# Patient Record
Sex: MALE | Race: BLACK OR AFRICAN AMERICAN | NOT HISPANIC OR LATINO | Employment: OTHER | URBAN - METROPOLITAN AREA
[De-identification: names, ages, dates, MRNs, and addresses within clinical notes are randomized per-mention and may not be internally consistent; named-entity substitution may affect disease eponyms.]

---

## 2019-03-27 DIAGNOSIS — M79.671 FOOT PAIN, BILATERAL: Primary | ICD-10-CM

## 2019-03-27 DIAGNOSIS — M79.672 FOOT PAIN, BILATERAL: Primary | ICD-10-CM

## 2019-04-30 ENCOUNTER — OFFICE VISIT (OUTPATIENT)
Dept: FAMILY MEDICINE | Facility: CLINIC | Age: 59
End: 2019-04-30
Payer: MEDICARE

## 2019-04-30 VITALS
WEIGHT: 178.25 LBS | DIASTOLIC BLOOD PRESSURE: 64 MMHG | SYSTOLIC BLOOD PRESSURE: 128 MMHG | OXYGEN SATURATION: 98 % | HEART RATE: 74 BPM | TEMPERATURE: 98 F

## 2019-04-30 DIAGNOSIS — R03.0 ELEVATED BLOOD-PRESSURE READING WITHOUT DIAGNOSIS OF HYPERTENSION: ICD-10-CM

## 2019-04-30 DIAGNOSIS — B35.1 ONYCHOMYCOSIS DUE TO DERMATOPHYTE: ICD-10-CM

## 2019-04-30 DIAGNOSIS — F20.9 SCHIZOPHRENIA, UNSPECIFIED TYPE: Primary | ICD-10-CM

## 2019-04-30 PROBLEM — E78.5 HYPERLIPIDEMIA: Status: ACTIVE | Noted: 2018-01-02

## 2019-04-30 PROBLEM — B18.2 CHRONIC HEPATITIS C: Status: ACTIVE | Noted: 2019-04-30

## 2019-04-30 PROBLEM — M25.9 DISORDER OF KNEE: Status: ACTIVE | Noted: 2019-04-30

## 2019-04-30 PROBLEM — B35.2 TINEA MANUS: Status: ACTIVE | Noted: 2019-04-30

## 2019-04-30 PROCEDURE — 99213 OFFICE O/P EST LOW 20 MIN: CPT | Mod: S$GLB,,, | Performed by: FAMILY MEDICINE

## 2019-04-30 PROCEDURE — 99213 PR OFFICE/OUTPT VISIT, EST, LEVL III, 20-29 MIN: ICD-10-PCS | Mod: S$GLB,,, | Performed by: FAMILY MEDICINE

## 2019-04-30 RX ORDER — AMANTADINE HYDROCHLORIDE 100 MG/1
CAPSULE, GELATIN COATED ORAL
Status: ON HOLD | COMMUNITY
Start: 2018-05-31 | End: 2022-09-28 | Stop reason: HOSPADM

## 2019-04-30 RX ORDER — BENZTROPINE MESYLATE 1 MG/1
TABLET ORAL
Status: ON HOLD | COMMUNITY
Start: 2018-05-31 | End: 2022-09-28 | Stop reason: HOSPADM

## 2019-04-30 RX ORDER — HALOPERIDOL 5 MG/1
TABLET ORAL
Status: ON HOLD | COMMUNITY
Start: 2019-03-17 | End: 2022-09-28 | Stop reason: SDUPTHER
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
## 2019-05-22 ENCOUNTER — OFFICE VISIT (OUTPATIENT)
Dept: FAMILY MEDICINE | Facility: CLINIC | Age: 59
End: 2019-05-22
Payer: MEDICARE

## 2019-05-22 VITALS
BODY MASS INDEX: 28.45 KG/M2 | OXYGEN SATURATION: 98 % | DIASTOLIC BLOOD PRESSURE: 76 MMHG | SYSTOLIC BLOOD PRESSURE: 134 MMHG | HEART RATE: 95 BPM | WEIGHT: 181.25 LBS | HEIGHT: 67 IN | TEMPERATURE: 99 F

## 2019-05-22 DIAGNOSIS — R03.0 ELEVATED BLOOD-PRESSURE READING WITHOUT DIAGNOSIS OF HYPERTENSION: ICD-10-CM

## 2019-05-22 DIAGNOSIS — M25.9: ICD-10-CM

## 2019-05-22 DIAGNOSIS — L84 CORN OR CALLUS: Primary | ICD-10-CM

## 2019-05-22 PROCEDURE — 99213 PR OFFICE/OUTPT VISIT, EST, LEVL III, 20-29 MIN: ICD-10-PCS | Mod: S$GLB,,, | Performed by: FAMILY MEDICINE

## 2019-05-22 PROCEDURE — 99213 OFFICE O/P EST LOW 20 MIN: CPT | Mod: S$GLB,,, | Performed by: FAMILY MEDICINE

## 2019-05-22 NOTE — PROGRESS NOTES
Subjective:       Patient ID: Brandyn Ty is a 58 y.o. male.    Chief Complaint: Foot Pain (Pt stated that he saw the podiatrist & shaved the underside of his left foot. Pt stated that he has another appt in 3 mo.)    57 yo M here for f/u on podiatrist visit for his corn/callus under the ball of the foot under left hallux.   Pt got the callus shaved of and he can walk without pain right now.  He is very happy about that. He does have an extensive limp 2ry to his left sided knee fusion.   Pt has another appt there in 3 months and he is looking forward to going there.  Pt's BP had been high in the past but pt states that he is taking a tylenol every day and that has kept his BP normal.  Pt has no other problems or concerns at this time    Review of Systems   Constitutional: Negative for chills, fatigue and fever.   HENT: Negative for congestion, drooling, sneezing and sore throat.    Eyes: Negative for pain and visual disturbance.   Respiratory: Negative for cough and shortness of breath.    Cardiovascular: Negative for chest pain.   Gastrointestinal: Negative for abdominal pain, constipation, diarrhea and nausea.   Endocrine: Negative for cold intolerance and heat intolerance.   Genitourinary: Negative for difficulty urinating and frequency.   Musculoskeletal: Negative for myalgias.   Allergic/Immunologic: Negative for food allergies.   Neurological: Negative for seizures and headaches.   Psychiatric/Behavioral: Negative for behavioral problems.       Objective:      Physical Exam   Constitutional: He appears well-developed.   HENT:   Right Ear: External ear normal.   Left Ear: External ear normal.   Mouth/Throat: Oropharynx is clear and moist.   Eyes: Conjunctivae and EOM are normal.   Neck: Normal range of motion.   Cardiovascular: Normal rate and regular rhythm.   Pulmonary/Chest: Effort normal and breath sounds normal.   Abdominal: Soft.   Musculoskeletal: Normal range of motion.   Neurological: He is  alert.   Skin: Skin is warm. Capillary refill takes less than 2 seconds.   Psychiatric: He has a normal mood and affect.   Nursing note and vitals reviewed.      Assessment:       1. Corn or callus    2. Elevated blood-pressure reading without diagnosis of hypertension    3. Disorder of knee        Plan:       PROBLEM LIST     Brandyn was seen today for foot pain.    Diagnoses and all orders for this visit:    Corn or callus    Elevated blood-pressure reading without diagnosis of hypertension    Disorder of knee

## 2019-06-27 ENCOUNTER — OFFICE VISIT (OUTPATIENT)
Dept: FAMILY MEDICINE | Facility: CLINIC | Age: 59
End: 2019-06-27
Payer: MEDICARE

## 2019-06-27 VITALS
DIASTOLIC BLOOD PRESSURE: 76 MMHG | HEART RATE: 85 BPM | HEIGHT: 67 IN | WEIGHT: 180 LBS | TEMPERATURE: 98 F | OXYGEN SATURATION: 98 % | BODY MASS INDEX: 28.25 KG/M2 | SYSTOLIC BLOOD PRESSURE: 124 MMHG

## 2019-06-27 DIAGNOSIS — M25.9: ICD-10-CM

## 2019-06-27 DIAGNOSIS — Z02.9 ADMINISTRATIVE ENCOUNTER: ICD-10-CM

## 2019-06-27 DIAGNOSIS — L84 CORN OR CALLUS: Primary | ICD-10-CM

## 2019-06-27 PROCEDURE — 99213 OFFICE O/P EST LOW 20 MIN: CPT | Mod: S$GLB,,, | Performed by: FAMILY MEDICINE

## 2019-06-27 PROCEDURE — 99213 PR OFFICE/OUTPT VISIT, EST, LEVL III, 20-29 MIN: ICD-10-PCS | Mod: S$GLB,,, | Performed by: FAMILY MEDICINE

## 2019-06-27 NOTE — PROGRESS NOTES
Subjective:       Patient ID: Brandyn Ty is a 58 y.o. male.    Chief Complaint: Foot Pain (Pt stated that he has left foot pain. Pt stated that he is going to [place] in Aug 14th. Pt stated that he needed some paperwork filled out for the [employer] system.)    57 yo M here for filling out some paper work for transportation issues. Pt cannot take the scheduled bus any more as he has a hard time with his balance and foot pain. He has a slightly mental disability as well as motor deterioration.  Pt also shows me the corn he has under his left big toe. He has gotten it shaved off in the past but it has started to grow back and it is starting to inhibit pt's walk.  BP is well controlled today  Pt has no other problems or concerns at this time    Review of Systems   Constitutional: Negative for chills, fatigue and fever.   HENT: Negative for congestion, drooling, sneezing and sore throat.    Eyes: Negative for pain and visual disturbance.   Respiratory: Negative for cough and shortness of breath.    Cardiovascular: Negative for chest pain.   Gastrointestinal: Negative for abdominal pain, constipation, diarrhea and nausea.   Endocrine: Negative for cold intolerance and heat intolerance.   Genitourinary: Negative for difficulty urinating and frequency.   Musculoskeletal: Negative for myalgias.   Allergic/Immunologic: Negative for food allergies.   Neurological: Negative for seizures and headaches.   Psychiatric/Behavioral: Negative for behavioral problems.       Objective:      Physical Exam   Constitutional: He appears well-developed.   HENT:   Right Ear: External ear normal.   Left Ear: External ear normal.   Mouth/Throat: Oropharynx is clear and moist.   Eyes: Conjunctivae and EOM are normal.   Neck: Normal range of motion.   Cardiovascular: Normal rate, regular rhythm and intact distal pulses.   Pulmonary/Chest: Effort normal and breath sounds normal.   Abdominal: Soft.   Musculoskeletal:  Normal range of motion.   Neurological: He is alert. Coordination abnormal.   Skin: Skin is warm. Capillary refill takes less than 2 seconds.   Thick corn under left toe-ball   Psychiatric: He has a normal mood and affect.   Nursing note and vitals reviewed.      Assessment:       1. Corn or callus    2. Administrative encounter        Plan:       PROBLEM LIST     Brandyn was seen today for foot pain.    Diagnoses and all orders for this visit:    Corn or callus    Administrative encounter

## 2019-08-22 ENCOUNTER — OFFICE VISIT (OUTPATIENT)
Dept: FAMILY MEDICINE | Facility: CLINIC | Age: 59
End: 2019-08-22
Payer: MEDICARE

## 2019-08-22 VITALS
RESPIRATION RATE: 16 BRPM | WEIGHT: 193 LBS | DIASTOLIC BLOOD PRESSURE: 90 MMHG | BODY MASS INDEX: 27.63 KG/M2 | SYSTOLIC BLOOD PRESSURE: 140 MMHG | HEART RATE: 82 BPM | HEIGHT: 70 IN | OXYGEN SATURATION: 98 % | TEMPERATURE: 97 F

## 2019-08-22 DIAGNOSIS — Z74.09 IMPAIRED MOBILITY: Primary | ICD-10-CM

## 2019-08-22 DIAGNOSIS — L84 CORN OR CALLUS: ICD-10-CM

## 2019-08-22 DIAGNOSIS — M25.9: ICD-10-CM

## 2019-08-22 DIAGNOSIS — B35.1 ONYCHOMYCOSIS DUE TO DERMATOPHYTE: ICD-10-CM

## 2019-08-22 DIAGNOSIS — Z91.81 AT RISK FOR FALLS: ICD-10-CM

## 2019-08-22 PROCEDURE — 99213 PR OFFICE/OUTPT VISIT, EST, LEVL III, 20-29 MIN: ICD-10-PCS | Mod: S$GLB,,, | Performed by: FAMILY MEDICINE

## 2019-08-22 PROCEDURE — 99213 OFFICE O/P EST LOW 20 MIN: CPT | Mod: S$GLB,,, | Performed by: FAMILY MEDICINE

## 2019-08-22 RX ORDER — DICYCLOMINE HYDROCHLORIDE 20 MG/1
TABLET ORAL
Refills: 3 | Status: ON HOLD | COMMUNITY
Start: 2019-07-22 | End: 2022-09-28 | Stop reason: HOSPADM
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
## 2019-10-23 ENCOUNTER — OFFICE VISIT (OUTPATIENT)
Dept: FAMILY MEDICINE | Facility: CLINIC | Age: 59
End: 2019-10-23
Payer: MEDICARE

## 2019-10-23 VITALS
WEIGHT: 189.63 LBS | TEMPERATURE: 97 F | OXYGEN SATURATION: 99 % | SYSTOLIC BLOOD PRESSURE: 120 MMHG | DIASTOLIC BLOOD PRESSURE: 70 MMHG | RESPIRATION RATE: 16 BRPM | HEIGHT: 70 IN | BODY MASS INDEX: 27.15 KG/M2 | HEART RATE: 79 BPM

## 2019-10-23 DIAGNOSIS — K04.7 INFECTED TOOTH: Primary | ICD-10-CM

## 2019-10-23 PROCEDURE — 99213 PR OFFICE/OUTPT VISIT, EST, LEVL III, 20-29 MIN: ICD-10-PCS | Mod: S$GLB,,, | Performed by: FAMILY MEDICINE

## 2019-10-23 PROCEDURE — 3008F BODY MASS INDEX DOCD: CPT | Mod: CPTII,S$GLB,, | Performed by: FAMILY MEDICINE

## 2019-10-23 PROCEDURE — 99213 OFFICE O/P EST LOW 20 MIN: CPT | Mod: S$GLB,,, | Performed by: FAMILY MEDICINE

## 2019-10-23 PROCEDURE — 3008F PR BODY MASS INDEX (BMI) DOCUMENTED: ICD-10-PCS | Mod: CPTII,S$GLB,, | Performed by: FAMILY MEDICINE

## 2019-10-23 RX ORDER — AMOXICILLIN AND CLAVULANATE POTASSIUM 875; 125 MG/1; MG/1
1 TABLET, FILM COATED ORAL EVERY 12 HOURS
Qty: 14 TABLET | Refills: 0 | Status: ON HOLD | OUTPATIENT
Start: 2019-10-23 | End: 2022-08-30 | Stop reason: HOSPADM

## 2019-10-23 NOTE — PROGRESS NOTES
Subjective:       Patient ID: Brandyn Ty is a 59 y.o. male.    Chief Complaint: Dental Pain (thinks he has an infection in one of his teeth he went to the dentist last week and they said they were not going to pull it because there was an infection. ) and Foot Pain (left foot has a corn on the bottom and states that there is one developing on his right foot now.)    58 yo M here for corn on the bottom of his foot. His callus had been filed off by Dr Lombardo but he retired. He then went to see Dr Marina who could not see him as pt had some run-in with his wife. He now will be seen by Dr Doll in December. Pt will try to get seen earlier - maybe getting on a cancellation list.   Pt is here today for an infection in his teeth. Pt has seen a dentist at [facility] and he said that pt had an infection in left tooth. Pt did not get an antibiotics as he thought he needed to talk to me first. Treatment: augmentin as per [employer]    Review of Systems   Constitutional: Negative for chills, fatigue and fever.   HENT: Negative for congestion, drooling, sneezing and sore throat.    Eyes: Negative for pain and visual disturbance.   Respiratory: Negative for cough and shortness of breath.    Cardiovascular: Negative for chest pain.   Gastrointestinal: Negative for abdominal pain, constipation, diarrhea and nausea.   Endocrine: Negative for cold intolerance and heat intolerance.   Genitourinary: Negative for difficulty urinating and frequency.   Musculoskeletal: Negative for myalgias.   Allergic/Immunologic: Negative for food allergies.   Neurological: Negative for seizures and headaches.   Psychiatric/Behavioral: Negative for behavioral problems.       Objective:      Physical Exam   Constitutional: He appears well-developed.   HENT:   Right Ear: External ear normal.   Left Ear: External ear normal.   Mouth/Throat: Oropharynx is clear and moist.   Left lower jaw tenderness   Eyes: Conjunctivae and EOM are normal.   Neck:  Normal range of motion.   Cardiovascular: Normal rate, regular rhythm and intact distal pulses.   Pulmonary/Chest: Effort normal and breath sounds normal.   Abdominal: Soft.   Musculoskeletal: Normal range of motion.   Neurological: He is alert.   Skin: Skin is warm. Capillary refill takes less than 2 seconds.   Psychiatric: He has a normal mood and affect.   Nursing note and vitals reviewed.      Assessment:       1. Infected tooth        Plan:       PROBLEM LIST     Brandyn was seen today for dental pain and foot pain.    Diagnoses and all orders for this visit:    Infected tooth  -     amoxicillin-clavulanate 875-125mg (AUGMENTIN) 875-125 mg per tablet; Take 1 tablet by mouth every 12 (twelve) hours.

## 2019-11-11 ENCOUNTER — OFFICE VISIT (OUTPATIENT)
Dept: FAMILY MEDICINE | Facility: CLINIC | Age: 59
End: 2019-11-11
Payer: MEDICARE

## 2019-11-11 VITALS
BODY MASS INDEX: 27.66 KG/M2 | OXYGEN SATURATION: 98 % | DIASTOLIC BLOOD PRESSURE: 72 MMHG | HEIGHT: 70 IN | SYSTOLIC BLOOD PRESSURE: 122 MMHG | RESPIRATION RATE: 16 BRPM | HEART RATE: 77 BPM | TEMPERATURE: 98 F | WEIGHT: 193.19 LBS

## 2019-11-11 DIAGNOSIS — R09.81 CONGESTION OF PARANASAL SINUS: ICD-10-CM

## 2019-11-11 DIAGNOSIS — R19.7 DIARRHEA, UNSPECIFIED TYPE: ICD-10-CM

## 2019-11-11 DIAGNOSIS — L84 CORN OR CALLUS: Primary | ICD-10-CM

## 2019-11-11 DIAGNOSIS — R03.0 ELEVATED BLOOD-PRESSURE READING WITHOUT DIAGNOSIS OF HYPERTENSION: ICD-10-CM

## 2019-11-11 DIAGNOSIS — E78.5 HYPERLIPIDEMIA, UNSPECIFIED HYPERLIPIDEMIA TYPE: ICD-10-CM

## 2019-11-11 DIAGNOSIS — R05.9 COUGH: ICD-10-CM

## 2019-11-11 PROCEDURE — 96372 THER/PROPH/DIAG INJ SC/IM: CPT | Mod: S$GLB,,, | Performed by: FAMILY MEDICINE

## 2019-11-11 PROCEDURE — 99214 PR OFFICE/OUTPT VISIT, EST, LEVL IV, 30-39 MIN: ICD-10-PCS | Mod: 25,S$GLB,, | Performed by: FAMILY MEDICINE

## 2019-11-11 PROCEDURE — 3008F PR BODY MASS INDEX (BMI) DOCUMENTED: ICD-10-PCS | Mod: CPTII,S$GLB,, | Performed by: FAMILY MEDICINE

## 2019-11-11 PROCEDURE — 99214 OFFICE O/P EST MOD 30 MIN: CPT | Mod: 25,S$GLB,, | Performed by: FAMILY MEDICINE

## 2019-11-11 PROCEDURE — 3008F BODY MASS INDEX DOCD: CPT | Mod: CPTII,S$GLB,, | Performed by: FAMILY MEDICINE

## 2019-11-11 PROCEDURE — 96372 PR INJECTION,THERAP/PROPH/DIAG2ST, IM OR SUBCUT: ICD-10-PCS | Mod: S$GLB,,, | Performed by: FAMILY MEDICINE

## 2019-11-11 RX ORDER — BETAMETHASONE SODIUM PHOSPHATE AND BETAMETHASONE ACETATE 3; 3 MG/ML; MG/ML
6 INJECTION, SUSPENSION INTRA-ARTICULAR; INTRALESIONAL; INTRAMUSCULAR; SOFT TISSUE
Status: COMPLETED | OUTPATIENT
Start: 2019-11-11 | End: 2019-11-11

## 2019-11-11 RX ORDER — PROMETHAZINE HYDROCHLORIDE AND CODEINE PHOSPHATE 6.25; 1 MG/5ML; MG/5ML
5 SOLUTION ORAL EVERY 4 HOURS PRN
Qty: 240 ML | Refills: 0 | Status: SHIPPED | OUTPATIENT
Start: 2019-11-11 | End: 2019-11-21

## 2019-11-11 RX ORDER — FLUTICASONE PROPIONATE 50 MCG
1 SPRAY, SUSPENSION (ML) NASAL 2 TIMES DAILY
Refills: 0 | Status: ON HOLD | COMMUNITY
Start: 2019-11-06 | End: 2022-09-28 | Stop reason: HOSPADM

## 2019-11-11 RX ADMIN — BETAMETHASONE SODIUM PHOSPHATE AND BETAMETHASONE ACETATE 6 MG: 3; 3 INJECTION, SUSPENSION INTRA-ARTICULAR; INTRALESIONAL; INTRAMUSCULAR; SOFT TISSUE at 11:11

## 2019-11-11 NOTE — PROGRESS NOTES
"Subjective:       Patient ID: Brandyn Ty is a 59 y.o. male.    Chief Complaint: Follow-up (pt is wanting a referral for his foot. pt states he has a back calas/corn on both feet.)    58 yo M here for continuing calluses on his soles of his feet. Pt walked-in to [facility] and he was seen there but Dr Lombardo retired. We also made another referral in March but pt was never contacted. Pt contacted his insurance and they gave him the name of Ruben Doll and Octavio Zurita who we will refer to today.   Pt has had these callus build ups for several months now. He has changed his footwear and his calluses came back anyway. Last successful visit with podiatry was when he got the calluses shaved off. This helped his foot pain tremendously and he has been searching for a new podiatrist ever since.   Pt had pneumonia and he went to [facility] for it.   BP is controlled today.  Pt thinks he still has congestion and cough from his walking pneumonia he was dx'd with last week. He was given meds but he wants "good" cough medication.     Review of Systems   Constitutional: Negative for chills, fatigue and fever.   HENT: Positive for congestion. Negative for drooling, sneezing and sore throat.    Eyes: Negative for pain and visual disturbance.   Respiratory: Positive for cough. Negative for shortness of breath.    Cardiovascular: Negative for chest pain.   Gastrointestinal: Negative for abdominal pain, constipation, diarrhea and nausea.   Endocrine: Negative for cold intolerance and heat intolerance.   Genitourinary: Negative for difficulty urinating and frequency.   Musculoskeletal: Negative for myalgias.   Allergic/Immunologic: Negative for food allergies.   Neurological: Negative for seizures and headaches.   Psychiatric/Behavioral: Negative for behavioral problems.       Objective:      Physical Exam   Constitutional: He appears well-developed.   HENT:   Right Ear: External ear normal.   Left Ear: External ear " normal.   Mouth/Throat: Oropharynx is clear and moist.   Eyes: Conjunctivae and EOM are normal.   Neck: Normal range of motion.   Cardiovascular: Normal rate, regular rhythm and intact distal pulses.   Pulmonary/Chest: Effort normal and breath sounds normal.   Abdominal: Soft.   Musculoskeletal: Normal range of motion. He exhibits no edema.   Neurological: He is alert.   Skin: Skin is warm. Capillary refill takes less than 2 seconds.   Psychiatric: He has a normal mood and affect.   Nursing note and vitals reviewed.      Assessment:       1. Corn or callus    2. Elevated blood-pressure reading without diagnosis of hypertension    3. Hyperlipidemia, unspecified hyperlipidemia type    4. Diarrhea, unspecified type    5. Cough    6. Congestion of paranasal sinus        Plan:       PROBLEM LIST     Brandyn was seen today for follow-up.    Diagnoses and all orders for this visit:    Hannah or callus  -     Ambulatory Referral to Podiatry    Elevated blood-pressure reading without diagnosis of hypertension  Comments:  resolved now    Hyperlipidemia, unspecified hyperlipidemia type  Comments:  supposedly not controlled - we have no labs    Diarrhea, unspecified type  Comments:  controlled now    Cough  -     promethazine-codeine 6.25-10 mg/5 ml (PHENERGAN WITH CODEINE) 6.25-10 mg/5 mL syrup; Take 5 mLs by mouth every 4 (four) hours as needed for Cough.  -     betamethasone acetate-betamethasone sodium phosphate injection 6 mg    Congestion of paranasal sinus  -     betamethasone acetate-betamethasone sodium phosphate injection 6 mg

## 2020-02-06 ENCOUNTER — OFFICE VISIT (OUTPATIENT)
Dept: FAMILY MEDICINE | Facility: CLINIC | Age: 60
End: 2020-02-06
Payer: MEDICARE

## 2020-02-06 VITALS
RESPIRATION RATE: 16 BRPM | WEIGHT: 191 LBS | BODY MASS INDEX: 27.35 KG/M2 | OXYGEN SATURATION: 98 % | SYSTOLIC BLOOD PRESSURE: 132 MMHG | HEIGHT: 70 IN | HEART RATE: 83 BPM | DIASTOLIC BLOOD PRESSURE: 86 MMHG | TEMPERATURE: 97 F

## 2020-02-06 DIAGNOSIS — S09.93XD INJURY OF TOOTH, SUBSEQUENT ENCOUNTER: Primary | Chronic | ICD-10-CM

## 2020-02-06 DIAGNOSIS — Z71.2 ENCOUNTER TO DISCUSS TEST RESULTS: ICD-10-CM

## 2020-02-06 DIAGNOSIS — E78.5 HYPERLIPIDEMIA, UNSPECIFIED HYPERLIPIDEMIA TYPE: Chronic | ICD-10-CM

## 2020-02-06 PROCEDURE — 3008F BODY MASS INDEX DOCD: CPT | Mod: CPTII,S$GLB,, | Performed by: FAMILY MEDICINE

## 2020-02-06 PROCEDURE — 3008F PR BODY MASS INDEX (BMI) DOCUMENTED: ICD-10-PCS | Mod: CPTII,S$GLB,, | Performed by: FAMILY MEDICINE

## 2020-02-06 PROCEDURE — 99213 OFFICE O/P EST LOW 20 MIN: CPT | Mod: S$GLB,,, | Performed by: FAMILY MEDICINE

## 2020-02-06 PROCEDURE — 99213 PR OFFICE/OUTPT VISIT, EST, LEVL III, 20-29 MIN: ICD-10-PCS | Mod: S$GLB,,, | Performed by: FAMILY MEDICINE

## 2020-02-06 RX ORDER — ACETAMINOPHEN 500 MG
500 TABLET ORAL 2 TIMES DAILY
Status: ON HOLD | COMMUNITY
End: 2022-08-30 | Stop reason: HOSPADM

## 2020-02-06 RX ORDER — DULOXETIN HYDROCHLORIDE 30 MG/1
30 CAPSULE, DELAYED RELEASE ORAL DAILY
Status: ON HOLD | COMMUNITY
End: 2022-09-28 | Stop reason: HOSPADM

## 2020-02-06 RX ORDER — SIMVASTATIN 20 MG/1
20 TABLET, FILM COATED ORAL NIGHTLY
Qty: 90 TABLET | Refills: 3 | Status: ON HOLD | OUTPATIENT
Start: 2020-02-06 | End: 2022-09-28

## 2020-02-06 RX ORDER — NAPROXEN 500 MG/1
500 TABLET ORAL 2 TIMES DAILY PRN
Status: ON HOLD | COMMUNITY
Start: 2020-01-22 | End: 2022-09-28 | Stop reason: HOSPADM

## 2020-02-06 NOTE — PROGRESS NOTES
Subjective:       Patient ID: Brandyn Ty is a 59 y.o. male.    Chief Complaint: Dental Pain    60 yo M here because he has lost two pieces of two teeth this morning and he has lost 1/2 piece of his wisdom tooth last week after eating an apple. Pt is wondering whether any of his meds can possibly be causing this.   Dentists in pt's insurance network are at [facility] on [facility]. He has tried making his own appt and he even went up there but he could not make an appt. It has to be done over the phone. He does not have the phone number with him today but discussed that we will help him call the dentist tomorrow when he comes with the phone number.  We are looking at his labs from another doctor and most of them are wnl except for elevated LDL. His ascvd = 11.3%- pt okay with getting on a statin.     Review of Systems   Constitutional: Negative for chills, fatigue and fever.   HENT: Negative for congestion, drooling, sneezing and sore throat.    Eyes: Negative for pain and visual disturbance.   Respiratory: Negative for cough and shortness of breath.    Cardiovascular: Negative for chest pain.   Gastrointestinal: Negative for abdominal pain, constipation, diarrhea and nausea.   Endocrine: Negative for cold intolerance and heat intolerance.   Genitourinary: Negative for difficulty urinating and frequency.   Musculoskeletal: Negative for myalgias.   Allergic/Immunologic: Negative for food allergies.   Neurological: Negative for seizures and headaches.   Psychiatric/Behavioral: Negative for behavioral problems.       Objective:      Physical Exam   Constitutional: He appears well-developed.   HENT:   Right Ear: External ear normal.   Left Ear: External ear normal.   Mouth/Throat: Oropharynx is clear and moist.   Eyes: Conjunctivae and EOM are normal.   Neck: Normal range of motion.   Cardiovascular: Normal rate, regular rhythm and intact distal pulses.   Pulmonary/Chest: Effort normal and breath sounds  normal.   Abdominal: Soft.   Musculoskeletal: Normal range of motion.   Neurological: He is alert.   Skin: Skin is warm. Capillary refill takes less than 2 seconds.   Psychiatric: He has a normal mood and affect.   Nursing note and vitals reviewed.      Assessment:       1. Injury of tooth, subsequent encounter    2. Encounter to discuss test results    3. Hyperlipidemia, unspecified hyperlipidemia type        Plan:       PROBLEM LIST     Brandyn was seen today for dental pain.    Diagnoses and all orders for this visit:    Injury of tooth, subsequent encounter  Comments:  chipped teeth    Encounter to discuss test results  Comments:  labs from different doctor - [facility]    Hyperlipidemia, unspecified hyperlipidemia type  Comments:  new dx; LDL = 131; ascvd = 11.7% - simvastatin started  Orders:  -     simvastatin (ZOCOR) 20 MG tablet; Take 1 tablet (20 mg total) by mouth every evening.

## 2020-04-08 ENCOUNTER — PATIENT OUTREACH (OUTPATIENT)
Dept: ADMINISTRATIVE | Facility: HOSPITAL | Age: 60
End: 2020-04-08

## 2020-04-08 NOTE — PROGRESS NOTES
Immunizations abstracted. New immunizations found. HM updated.  Care Everywhere abstracted, no records found.  [facility]: no new records found [facility]: no new records found [facility]: no new records found.  *KDL*

## 2020-04-20 ENCOUNTER — TELEPHONE (OUTPATIENT)
Dept: FAMILY MEDICINE | Facility: CLINIC | Age: 60
End: 2020-04-20

## 2020-04-20 NOTE — TELEPHONE ENCOUNTER
Called pt. On 304 number and on [phone] number, both were wrong numbers. Left message on home number, [phone] number.

## 2020-04-20 NOTE — TELEPHONE ENCOUNTER
----- Message from Sam Briones sent at 4/20/2020  1:48 PM CDT -----  Contact: Pt  Please call Brandyn to discuss some medication he is requesting but couldn't remember the name of during the call [phone].

## 2020-05-15 ENCOUNTER — TELEPHONE (OUTPATIENT)
Dept: FAMILY MEDICINE | Facility: CLINIC | Age: 60
End: 2020-05-15

## 2020-05-15 NOTE — TELEPHONE ENCOUNTER
I called Leobardo back, She was wanting to know what HH this pt used in the past but I looked through his chart and didn't see anything about us sending him to  or any notes as to which one he used. I let her know that.

## 2020-05-15 NOTE — TELEPHONE ENCOUNTER
----- Message from Madeline Awad sent at 5/15/2020  9:30 AM CDT -----  [facility]   Ms. Booth called in regards to when the patient last home health was , please call back at [phone].        Thanks,  Madeline Awad

## 2021-07-08 ENCOUNTER — TELEPHONE (OUTPATIENT)
Dept: PRIMARY CARE CLINIC | Facility: CLINIC | Age: 61
End: 2021-07-08

## 2021-09-24 ENCOUNTER — PATIENT OUTREACH (OUTPATIENT)
Dept: ADMINISTRATIVE | Facility: HOSPITAL | Age: 61
End: 2021-09-24

## 2022-01-25 ENCOUNTER — PATIENT OUTREACH (OUTPATIENT)
Dept: ADMINISTRATIVE | Facility: HOSPITAL | Age: 62
End: 2022-01-25
Payer: MEDICARE

## 2022-01-25 NOTE — PROGRESS NOTES
[employer] Report for A1C  None found, pt not seen in 2021.  Pt does not have diagnosis of diabetes and is not on registry.

## 2022-02-09 ENCOUNTER — HISTORICAL (OUTPATIENT)
Dept: ADMINISTRATIVE | Facility: HOSPITAL | Age: 62
End: 2022-02-09

## 2022-02-09 LAB
ALBUMIN SERPL-MCNC: 3 G/DL (ref 3.4–4.8)
ALBUMIN/GLOB SERPL: 0.8 {RATIO} (ref 1.1–2)
ALP SERPL-CCNC: 73 U/L (ref 40–150)
ALT SERPL-CCNC: 15 U/L (ref 0–55)
AST SERPL-CCNC: 13 U/L (ref 5–34)
BILIRUB SERPL-MCNC: 0.2 MG/DL
BILIRUBIN DIRECT+TOT PNL SERPL-MCNC: 0.1 (ref 0–0.5)
BILIRUBIN DIRECT+TOT PNL SERPL-MCNC: 0.1 (ref 0–0.8)
BUN SERPL-MCNC: 8.6 MG/DL (ref 8.4–25.7)
CALCIUM SERPL-MCNC: 9.3 MG/DL (ref 8.7–10.5)
CHLORIDE SERPL-SCNC: 107 MMOL/L (ref 98–107)
CHOLEST SERPL-MCNC: 147 MG/DL
CHOLEST/HDLC SERPL: 4 {RATIO} (ref 0–5)
CO2 SERPL-SCNC: 26 MMOL/L (ref 23–31)
CREAT SERPL-MCNC: 0.8 MG/DL (ref 0.73–1.18)
ERYTHROCYTE [DISTWIDTH] IN BLOOD BY AUTOMATED COUNT: 13.2 % (ref 11.5–17)
GLOBULIN SER-MCNC: 3.8 G/DL (ref 2.4–3.5)
GLUCOSE SERPL-MCNC: 82 MG/DL (ref 82–115)
HCT VFR BLD AUTO: 39.6 % (ref 42–52)
HDLC SERPL-MCNC: 33 MG/DL (ref 35–60)
HEMOLYSIS INTERF INDEX SERPL-ACNC: 0
HGB BLD-MCNC: 12.4 G/DL (ref 14–18)
ICTERIC INTERF INDEX SERPL-ACNC: 0
LDLC SERPL CALC-MCNC: 94 MG/DL (ref 50–140)
LIPEMIC INTERF INDEX SERPL-ACNC: 3
MCH RBC QN AUTO: 28.1 PG (ref 27–31)
MCHC RBC AUTO-ENTMCNC: 31.3 G/DL (ref 33–36)
MCV RBC AUTO: 89.6 FL (ref 80–94)
PLATELET # BLD AUTO: 374 10*3/UL (ref 130–400)
PMV BLD AUTO: 9.4 FL (ref 9.4–12.4)
POTASSIUM SERPL-SCNC: 4.1 MMOL/L (ref 3.5–5.1)
PROT SERPL-MCNC: 6.8 G/DL (ref 5.8–7.6)
RBC # BLD AUTO: 4.42 10*6/UL (ref 4.7–6.1)
SODIUM SERPL-SCNC: 142 MMOL/L (ref 136–145)
TRIGL SERPL-MCNC: 100 MG/DL (ref 34–140)
TSH SERPL-ACNC: 1.4 M[IU]/L (ref 0.35–4.94)
VLDLC SERPL CALC-MCNC: 20 MG/DL
WBC # SPEC AUTO: 10.2 10*3/UL (ref 4.5–11.5)

## 2022-02-10 ENCOUNTER — HISTORICAL (OUTPATIENT)
Dept: ADMINISTRATIVE | Facility: HOSPITAL | Age: 62
End: 2022-02-10

## 2022-02-10 LAB
AMPHET UR QL SCN: NEGATIVE
APPEARANCE, UA: CLEAR
BACTERIA SPEC CULT: NORMAL
BARBITURATE SCN PRESENT UR: NEGATIVE
BENZODIAZ UR QL SCN: NEGATIVE
BILIRUB UR QL STRIP: NEGATIVE
CANNABINOIDS UR QL SCN: NEGATIVE
COCAINE UR QL SCN: NEGATIVE
COLOR UR: YELLOW
GLUCOSE (UA): NEGATIVE
HGB UR QL STRIP: NORMAL
KETONES UR QL STRIP: NEGATIVE
LEUKOCYTE ESTERASE UR QL STRIP: NORMAL
NITRITE UR QL STRIP: POSITIVE
OPIATES UR QL SCN: NEGATIVE
PCP UR QL: NEGATIVE
PH UR STRIP.AUTO: 7 [PH] (ref 5–7.5)
PH UR STRIP: 7 [PH] (ref 5–9)
PROT UR QL STRIP: NEGATIVE
RBC #/AREA URNS HPF: NORMAL /[HPF] (ref 0–2)
SP GR FLD REFRACTOMETRY: 1.01 (ref 1–1.03)
SP GR UR STRIP: 1.01 (ref 1–1.03)
SQUAMOUS EPITHELIAL, UA: NORMAL
UROBILINOGEN UR STRIP-ACNC: 0.2
WBC #/AREA URNS HPF: NORMAL /[HPF] (ref 0–2)

## 2022-08-24 ENCOUNTER — HOSPITAL ENCOUNTER (INPATIENT)
Facility: HOSPITAL | Age: 62
LOS: 35 days | Discharge: NURSING, REHAB OR LONG-TERM CARE | DRG: 480 | End: 2022-09-28
Attending: EMERGENCY MEDICINE | Admitting: ORTHOPAEDIC SURGERY
Payer: MEDICARE

## 2022-08-24 ENCOUNTER — ANESTHESIA EVENT (OUTPATIENT)
Dept: SURGERY | Facility: HOSPITAL | Age: 62
DRG: 480 | End: 2022-08-24
Payer: MEDICARE

## 2022-08-24 ENCOUNTER — ANESTHESIA (OUTPATIENT)
Dept: SURGERY | Facility: HOSPITAL | Age: 62
DRG: 480 | End: 2022-08-24
Payer: MEDICARE

## 2022-08-24 DIAGNOSIS — S82.132F: ICD-10-CM

## 2022-08-24 DIAGNOSIS — S82.142C: ICD-10-CM

## 2022-08-24 DIAGNOSIS — E78.5 HYPERLIPIDEMIA, UNSPECIFIED HYPERLIPIDEMIA TYPE: Chronic | ICD-10-CM
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
## 2022-08-24 DIAGNOSIS — S82.262A CLOSED DISPLACED SEGMENTAL FRACTURE OF SHAFT OF LEFT TIBIA, INITIAL ENCOUNTER: Primary | ICD-10-CM

## 2022-08-24 PROBLEM — S72.422C: Status: ACTIVE | Noted: 2022-08-24

## 2022-08-24 LAB
ALBUMIN SERPL-MCNC: 4.2 GM/DL (ref 3.4–4.8)
ALBUMIN/GLOB SERPL: 0.9 RATIO (ref 1.1–2)
ALP SERPL-CCNC: 71 UNIT/L (ref 40–150)
ALT SERPL-CCNC: 19 UNIT/L (ref 0–55)
APTT PPP: 31.3 SECONDS (ref 23.2–33.7)
AST SERPL-CCNC: 25 UNIT/L (ref 5–34)
BASOPHILS # BLD AUTO: 0.06 X10(3)/MCL (ref 0–0.2)
BASOPHILS NFR BLD AUTO: 0.4 %
BILIRUBIN DIRECT+TOT PNL SERPL-MCNC: 0.6 MG/DL
BUN SERPL-MCNC: 9.4 MG/DL (ref 8.4–25.7)
CALCIUM SERPL-MCNC: 9.8 MG/DL (ref 8.8–10)
CHLORIDE SERPL-SCNC: 89 MMOL/L (ref 98–107)
CO2 SERPL-SCNC: 23 MMOL/L (ref 23–31)
CREAT SERPL-MCNC: 0.91 MG/DL (ref 0.73–1.18)
EOSINOPHIL # BLD AUTO: 0.17 X10(3)/MCL (ref 0–0.9)
EOSINOPHIL NFR BLD AUTO: 1 %
ERYTHROCYTE [DISTWIDTH] IN BLOOD BY AUTOMATED COUNT: 12.7 % (ref 11.5–17)
GFR SERPLBLD CREATININE-BSD FMLA CKD-EPI: >60 MLS/MIN/1.73/M2
GLOBULIN SER-MCNC: 4.6 GM/DL (ref 2.4–3.5)
GLUCOSE SERPL-MCNC: 104 MG/DL (ref 82–115)
GROUP & RH: NORMAL
HCT VFR BLD AUTO: 39.6 % (ref 42–52)
HGB BLD-MCNC: 13.7 GM/DL (ref 14–18)
IMM GRANULOCYTES # BLD AUTO: 0.09 X10(3)/MCL (ref 0–0.04)
IMM GRANULOCYTES NFR BLD AUTO: 0.6 %
INDIRECT COOMBS GEL: NORMAL
INR BLD: 1.05 (ref 0–1.3)
LYMPHOCYTES # BLD AUTO: 1.17 X10(3)/MCL (ref 0.6–4.6)
LYMPHOCYTES NFR BLD AUTO: 7.2 %
MCH RBC QN AUTO: 29.5 PG (ref 27–31)
MCHC RBC AUTO-ENTMCNC: 34.6 MG/DL (ref 33–36)
MCV RBC AUTO: 85.2 FL (ref 80–94)
MONOCYTES # BLD AUTO: 1.09 X10(3)/MCL (ref 0.1–1.3)
MONOCYTES NFR BLD AUTO: 6.7 %
NEUTROPHILS # BLD AUTO: 13.8 X10(3)/MCL (ref 2.1–9.2)
NEUTROPHILS NFR BLD AUTO: 84.1 %
NRBC BLD AUTO-RTO: 0 %
PLATELET # BLD AUTO: 397 X10(3)/MCL (ref 130–400)
PMV BLD AUTO: 9.2 FL (ref 7.4–10.4)
POTASSIUM SERPL-SCNC: 4 MMOL/L (ref 3.5–5.1)
PROT SERPL-MCNC: 8.8 GM/DL (ref 5.8–7.6)
PROTHROMBIN TIME: 13.6 SECONDS (ref 12.5–14.5)
RBC # BLD AUTO: 4.65 X10(6)/MCL (ref 4.7–6.1)
SODIUM SERPL-SCNC: 123 MMOL/L (ref 136–145)
WBC # SPEC AUTO: 16.4 X10(3)/MCL (ref 4.5–11.5)

## 2022-08-24 PROCEDURE — 85610 PROTHROMBIN TIME: CPT | Performed by: EMERGENCY MEDICINE

## 2022-08-24 PROCEDURE — 90471 IMMUNIZATION ADMIN: CPT | Performed by: EMERGENCY MEDICINE

## 2022-08-24 PROCEDURE — 11012 PR DEBRIDE ASSOC OPEN FX/DISLO SKIN/MUS/BONE: ICD-10-PCS | Mod: 51,,, | Performed by: ORTHOPAEDIC SURGERY

## 2022-08-24 PROCEDURE — 85025 COMPLETE CBC W/AUTO DIFF WBC: CPT | Performed by: EMERGENCY MEDICINE

## 2022-08-24 PROCEDURE — 36415 COLL VENOUS BLD VENIPUNCTURE: CPT | Performed by: EMERGENCY MEDICINE

## 2022-08-24 PROCEDURE — 63600175 PHARM REV CODE 636 W HCPCS: Performed by: NURSE ANESTHETIST, CERTIFIED REGISTERED

## 2022-08-24 PROCEDURE — 37000008 HC ANESTHESIA 1ST 15 MINUTES: Performed by: ORTHOPAEDIC SURGERY

## 2022-08-24 PROCEDURE — C1713 ANCHOR/SCREW BN/BN,TIS/BN: HCPCS | Performed by: ORTHOPAEDIC SURGERY

## 2022-08-24 PROCEDURE — 80053 COMPREHEN METABOLIC PANEL: CPT | Performed by: EMERGENCY MEDICINE

## 2022-08-24 PROCEDURE — 86901 BLOOD TYPING SEROLOGIC RH(D): CPT | Performed by: EMERGENCY MEDICINE

## 2022-08-24 PROCEDURE — 36000711: Performed by: ORTHOPAEDIC SURGERY

## 2022-08-24 PROCEDURE — 11000001 HC ACUTE MED/SURG PRIVATE ROOM

## 2022-08-24 PROCEDURE — 99285 EMERGENCY DEPT VISIT HI MDM: CPT | Mod: 25

## 2022-08-24 PROCEDURE — 25000003 PHARM REV CODE 250: Performed by: NURSE ANESTHETIST, CERTIFIED REGISTERED

## 2022-08-24 PROCEDURE — 27201423 OPTIME MED/SURG SUP & DEVICES STERILE SUPPLY: Performed by: ORTHOPAEDIC SURGERY

## 2022-08-24 PROCEDURE — 85730 THROMBOPLASTIN TIME PARTIAL: CPT | Performed by: EMERGENCY MEDICINE

## 2022-08-24 PROCEDURE — 63600175 PHARM REV CODE 636 W HCPCS: Performed by: ORTHOPAEDIC SURGERY

## 2022-08-24 PROCEDURE — 71000033 HC RECOVERY, INTIAL HOUR: Performed by: ORTHOPAEDIC SURGERY

## 2022-08-24 PROCEDURE — 36000710: Performed by: ORTHOPAEDIC SURGERY

## 2022-08-24 PROCEDURE — 20690 APPL UNIPLN UNI EXT FIXJ SYS: CPT | Mod: LT,,, | Performed by: ORTHOPAEDIC SURGERY

## 2022-08-24 PROCEDURE — 96374 THER/PROPH/DIAG INJ IV PUSH: CPT

## 2022-08-24 PROCEDURE — 63600175 PHARM REV CODE 636 W HCPCS: Performed by: EMERGENCY MEDICINE

## 2022-08-24 PROCEDURE — 90715 TDAP VACCINE 7 YRS/> IM: CPT | Performed by: EMERGENCY MEDICINE

## 2022-08-24 PROCEDURE — 25000003 PHARM REV CODE 250: Performed by: EMERGENCY MEDICINE

## 2022-08-24 PROCEDURE — 20690 PR APPLY BONE UNIPLANE,EXT FIX DEV: ICD-10-PCS | Mod: LT,,, | Performed by: ORTHOPAEDIC SURGERY

## 2022-08-24 PROCEDURE — 99283 PR EMERGENCY DEPT VISIT,LEVEL III: ICD-10-PCS | Mod: 57,,, | Performed by: ORTHOPAEDIC SURGERY

## 2022-08-24 PROCEDURE — 99283 EMERGENCY DEPT VISIT LOW MDM: CPT | Mod: 57,,, | Performed by: ORTHOPAEDIC SURGERY

## 2022-08-24 PROCEDURE — 11012 DEB SKIN BONE AT FX SITE: CPT | Mod: 51,,, | Performed by: ORTHOPAEDIC SURGERY

## 2022-08-24 PROCEDURE — 63600175 PHARM REV CODE 636 W HCPCS: Performed by: ANESTHESIOLOGY

## 2022-08-24 PROCEDURE — 37000009 HC ANESTHESIA EA ADD 15 MINS: Performed by: ORTHOPAEDIC SURGERY

## 2022-08-24 DEVICE — SCREW SCHANZ 5MM X 200MM: Type: IMPLANTABLE DEVICE | Site: LEG | Status: FUNCTIONAL

## 2022-08-24 DEVICE — CLAMP LG 6 POSITION MULTI-PIN: Type: IMPLANTABLE DEVICE | Site: LEG | Status: FUNCTIONAL

## 2022-08-24 DEVICE — CLAMP COMBINATION / LG EXT FIX: Type: IMPLANTABLE DEVICE | Site: LEG | Status: FUNCTIONAL

## 2022-08-24 DEVICE — POST FIXATOR EXTERAL 11MM SS: Type: IMPLANTABLE DEVICE | Site: LEG | Status: FUNCTIONAL

## 2022-08-24 RX ORDER — SODIUM CHLORIDE 0.9 % (FLUSH) 0.9 %
10 SYRINGE (ML) INJECTION
Status: DISCONTINUED | OUTPATIENT
Start: 2022-08-24 | End: 2022-09-28 | Stop reason: HOSPADM

## 2022-08-24 RX ORDER — CEFAZOLIN SODIUM 1 G/3ML
2 INJECTION, POWDER, FOR SOLUTION INTRAMUSCULAR; INTRAVENOUS
Status: COMPLETED | OUTPATIENT
Start: 2022-08-24 | End: 2022-08-24

## 2022-08-24 RX ORDER — EPHEDRINE SULFATE 50 MG/ML
INJECTION, SOLUTION INTRAVENOUS
Status: DISCONTINUED | OUTPATIENT
Start: 2022-08-24 | End: 2022-08-24

## 2022-08-24 RX ORDER — ENOXAPARIN SODIUM 100 MG/ML
40 INJECTION SUBCUTANEOUS EVERY 24 HOURS
Status: DISCONTINUED | OUTPATIENT
Start: 2022-08-25 | End: 2022-09-15

## 2022-08-24 RX ORDER — PHENYLEPHRINE HCL IN 0.9% NACL 1 MG/10 ML
SYRINGE (ML) INTRAVENOUS
Status: DISCONTINUED | OUTPATIENT
Start: 2022-08-24 | End: 2022-08-24

## 2022-08-24 RX ORDER — DEXAMETHASONE SODIUM PHOSPHATE 4 MG/ML
INJECTION, SOLUTION INTRA-ARTICULAR; INTRALESIONAL; INTRAMUSCULAR; INTRAVENOUS; SOFT TISSUE
Status: DISCONTINUED | OUTPATIENT
Start: 2022-08-24 | End: 2022-08-24

## 2022-08-24 RX ORDER — ONDANSETRON 2 MG/ML
4 INJECTION INTRAMUSCULAR; INTRAVENOUS EVERY 12 HOURS PRN
Status: DISCONTINUED | OUTPATIENT
Start: 2022-08-24 | End: 2022-09-28 | Stop reason: HOSPADM

## 2022-08-24 RX ORDER — ROCURONIUM BROMIDE 10 MG/ML
INJECTION, SOLUTION INTRAVENOUS
Status: DISCONTINUED | OUTPATIENT
Start: 2022-08-24 | End: 2022-08-24

## 2022-08-24 RX ORDER — ONDANSETRON 2 MG/ML
4 INJECTION INTRAMUSCULAR; INTRAVENOUS DAILY PRN
Status: DISCONTINUED | OUTPATIENT
Start: 2022-08-24 | End: 2022-09-28 | Stop reason: HOSPADM

## 2022-08-24 RX ORDER — HYDROMORPHONE HYDROCHLORIDE 2 MG/ML
0.4 INJECTION, SOLUTION INTRAMUSCULAR; INTRAVENOUS; SUBCUTANEOUS EVERY 5 MIN PRN
Status: DISCONTINUED | OUTPATIENT
Start: 2022-08-24 | End: 2022-09-28 | Stop reason: HOSPADM

## 2022-08-24 RX ORDER — VANCOMYCIN HYDROCHLORIDE 1 G/20ML
INJECTION, POWDER, LYOPHILIZED, FOR SOLUTION INTRAVENOUS
Status: DISCONTINUED | OUTPATIENT
Start: 2022-08-24 | End: 2022-08-24 | Stop reason: HOSPADM

## 2022-08-24 RX ORDER — CEFAZOLIN SODIUM 2 G/50ML
2 SOLUTION INTRAVENOUS
Status: COMPLETED | OUTPATIENT
Start: 2022-08-25 | End: 2022-08-25

## 2022-08-24 RX ORDER — SUCCINYLCHOLINE CHLORIDE 20 MG/ML
INJECTION INTRAMUSCULAR; INTRAVENOUS
Status: DISCONTINUED | OUTPATIENT
Start: 2022-08-24 | End: 2022-08-24

## 2022-08-24 RX ORDER — PROPOFOL 10 MG/ML
VIAL (ML) INTRAVENOUS
Status: DISCONTINUED | OUTPATIENT
Start: 2022-08-24 | End: 2022-08-24

## 2022-08-24 RX ORDER — SODIUM CHLORIDE 9 MG/ML
1000 INJECTION, SOLUTION INTRAVENOUS
Status: COMPLETED | OUTPATIENT
Start: 2022-08-24 | End: 2022-08-24

## 2022-08-24 RX ORDER — ONDANSETRON 2 MG/ML
INJECTION INTRAMUSCULAR; INTRAVENOUS
Status: DISCONTINUED | OUTPATIENT
Start: 2022-08-24 | End: 2022-08-24

## 2022-08-24 RX ORDER — OXYCODONE AND ACETAMINOPHEN 5; 325 MG/1; MG/1
1 TABLET ORAL EVERY 4 HOURS PRN
Status: DISCONTINUED | OUTPATIENT
Start: 2022-08-24 | End: 2022-08-26

## 2022-08-24 RX ORDER — FENTANYL CITRATE 50 UG/ML
INJECTION, SOLUTION INTRAMUSCULAR; INTRAVENOUS
Status: DISCONTINUED | OUTPATIENT
Start: 2022-08-24 | End: 2022-08-24

## 2022-08-24 RX ORDER — HYDROMORPHONE HYDROCHLORIDE 2 MG/ML
0.5 INJECTION, SOLUTION INTRAMUSCULAR; INTRAVENOUS; SUBCUTANEOUS
Status: COMPLETED | OUTPATIENT
Start: 2022-08-24 | End: 2022-08-24

## 2022-08-24 RX ORDER — MORPHINE SULFATE 10 MG/ML
4 INJECTION INTRAMUSCULAR; INTRAVENOUS; SUBCUTANEOUS EVERY 10 MIN PRN
Status: DISCONTINUED | OUTPATIENT
Start: 2022-08-24 | End: 2022-09-28 | Stop reason: HOSPADM

## 2022-08-24 RX ORDER — MUPIROCIN 20 MG/G
OINTMENT TOPICAL 2 TIMES DAILY
Status: DISPENSED | OUTPATIENT
Start: 2022-08-24 | End: 2022-08-29

## 2022-08-24 RX ORDER — ALBUMIN HUMAN 250 G/1000ML
SOLUTION INTRAVENOUS CONTINUOUS PRN
Status: DISCONTINUED | OUTPATIENT
Start: 2022-08-24 | End: 2022-08-24

## 2022-08-24 RX ADMIN — HYDROMORPHONE HYDROCHLORIDE 0.5 MG: 2 INJECTION, SOLUTION INTRAMUSCULAR; INTRAVENOUS; SUBCUTANEOUS at 06:08

## 2022-08-24 RX ADMIN — ROCURONIUM BROMIDE 40 MG: 10 SOLUTION INTRAVENOUS at 07:08

## 2022-08-24 RX ADMIN — ROCURONIUM BROMIDE 10 MG: 10 SOLUTION INTRAVENOUS at 07:08

## 2022-08-24 RX ADMIN — DEXAMETHASONE SODIUM PHOSPHATE 4 MG: 4 INJECTION, SOLUTION INTRA-ARTICULAR; INTRALESIONAL; INTRAMUSCULAR; INTRAVENOUS; SOFT TISSUE at 07:08

## 2022-08-24 RX ADMIN — FENTANYL CITRATE 50 MCG: 50 INJECTION, SOLUTION INTRAMUSCULAR; INTRAVENOUS at 08:08

## 2022-08-24 RX ADMIN — Medication 200 MCG: at 07:08

## 2022-08-24 RX ADMIN — SUCCINYLCHOLINE CHLORIDE 120 MG: 20 INJECTION, SOLUTION INTRAMUSCULAR; INTRAVENOUS at 07:08

## 2022-08-24 RX ADMIN — SODIUM CHLORIDE, SODIUM GLUCONATE, SODIUM ACETATE, POTASSIUM CHLORIDE AND MAGNESIUM CHLORIDE: 526; 502; 368; 37; 30 INJECTION, SOLUTION INTRAVENOUS at 07:08

## 2022-08-24 RX ADMIN — TETANUS TOXOID, REDUCED DIPHTHERIA TOXOID AND ACELLULAR PERTUSSIS VACCINE, ADSORBED 0.5 ML: 5; 2.5; 8; 8; 2.5 SUSPENSION INTRAMUSCULAR at 06:08

## 2022-08-24 RX ADMIN — ALBUMIN HUMAN: 250 SOLUTION INTRAVENOUS at 07:08

## 2022-08-24 RX ADMIN — FENTANYL CITRATE 100 MCG: 50 INJECTION, SOLUTION INTRAMUSCULAR; INTRAVENOUS at 07:08

## 2022-08-24 RX ADMIN — HYDROMORPHONE HYDROCHLORIDE 0.4 MG: 2 INJECTION, SOLUTION INTRAMUSCULAR; INTRAVENOUS; SUBCUTANEOUS at 08:08

## 2022-08-24 RX ADMIN — EPHEDRINE SULFATE 25 MG: 50 INJECTION INTRAVENOUS at 07:08

## 2022-08-24 RX ADMIN — PROPOFOL 200 MG: 10 INJECTION, EMULSION INTRAVENOUS at 07:08

## 2022-08-24 RX ADMIN — SODIUM CHLORIDE 1000 ML: 9 INJECTION, SOLUTION INTRAVENOUS at 06:08

## 2022-08-24 RX ADMIN — ONDANSETRON 4 MG: 2 INJECTION INTRAMUSCULAR; INTRAVENOUS at 07:08

## 2022-08-24 RX ADMIN — SUGAMMADEX 200 MG: 100 INJECTION, SOLUTION INTRAVENOUS at 08:08

## 2022-08-24 RX ADMIN — CEFAZOLIN 2 G: 1 INJECTION, POWDER, FOR SOLUTION INTRAMUSCULAR; INTRAVENOUS at 06:08

## 2022-08-24 NOTE — ED NOTES
Bed: 08  Expected date:   Expected time:   Means of arrival:   Comments:  Ems fall, knee deformity

## 2022-08-24 NOTE — H&P
[facility] - Emergency Dept  Orthopedic Trauma  History and Physical    Patient Name: Brandyn Ty  MRN: [id number]  Admission Date: 8/24/2022  Hospital Length of Stay: 0 days  Attending Provider: Brandyn Roman MD  Primary Care Provider: Elizabeth Marin MD        Chief Complaint:   Chief Complaint   Patient presents with    Knee Injury     Pt to ER via [facility] for knee pain.  Pt slipped and fell and landed on left knee (previous replacement).  Knee is open.         HPI:  Patient is a 62-year-old gentleman with history of schizophrenia hypertension with a poor historian.  Patient fell onto his left knee and fractured through his fusion.  Patient has a grade 3A open fracture.  Patient unable to discuss the details of his injury.  Appears he has had multiple surgeries to his left knee    Past Medical History:   Diagnosis Date    Anxiety     Hypertension        History reviewed. No pertinent surgical history.    Review of patient's allergies indicates:   Allergen Reactions    Chlorpromazine      Other reaction(s): Chlorpromazine, Unknown - See comments    Fluphenazine      Other reaction(s): Fluphenazine, Unknown - See comments    Trazodone      Other reaction(s): Trazodone, Unknown - See comments       Current Facility-Administered Medications   Medication    0.9%  NaCl infusion    ceFAZolin injection 2 g    Tdap (BOOSTRIX) vaccine injection 0.5 mL     Current Outpatient Medications   Medication Sig    acetaminophen (TYLENOL) 500 MG tablet Take 500 mg by mouth 2 (two) times daily.    amantadine HCl (SYMMETREL) 100 mg capsule amantadine HCl 100 mg capsule   PO BiD    amoxicillin-clavulanate 875-125mg (AUGMENTIN) 875-125 mg per tablet Take 1 tablet by mouth every 12 (twelve) hours. (Patient not taking: Reported on 2/6/2020)    benztropine (COGENTIN) 1 MG tablet benztropine 1 mg tablet    dicyclomine (BENTYL) 20 mg tablet TAKE 1 TABLET BY MOUTH 4 TIMES DAILY    DULoxetine (CYMBALTA)  30 MG capsule Take 30 mg by mouth once daily.    fluticasone propionate (FLONASE) 50 mcg/actuation nasal spray 1 spray by Each Nostril route 2 (two) times daily.    haloperidol (HALDOL) 5 MG tablet     naproxen (NAPROSYN) 500 MG tablet Take 500 mg by mouth 2 (two) times daily as needed.    simvastatin (ZOCOR) 20 MG tablet Take 1 tablet (20 mg total) by mouth every evening.     Family History     Family history is unknown by patient.        Tobacco Use    Smoking status: Never Smoker    Smokeless tobacco: Never Used   Substance and Sexual Activity    Alcohol use: Not Currently    Drug use: Not on file    Sexual activity: Not on file       ROS:  Constitutional: Denies fever chills  Eyes: No change in vision  ENT: No ringing or current infections  CV: No chest pain  Resp: No labored breathing  MSK: Pain evident at site of injury located in HPI,   Integ: No signs of abrasions or lacerations  Neuro: No numbness or tingling  Lymphatic: No swelling outside the area of injury   Objective:     Vital Signs (Most Recent):  Temp: 97.5 °F (36.4 °C) (08/24/22 1801)  Pulse: 92 (08/24/22 1801)  Resp: 18 (08/24/22 1801)  BP: 115/63 (08/24/22 1801)  SpO2: 97 % (08/24/22 1801) Vital Signs (24h Range):  Temp:  [97.5 °F (36.4 °C)] 97.5 °F (36.4 °C)  Pulse:  [92] 92  Resp:  [18] 18  SpO2:  [97 %] 97 %  BP: (115)/(63) 115/63           There is no height or weight on file to calculate BMI.    No intake or output data in the 24 hours ending 08/24/22 1830    Ortho/SPM Exam  General the patient is alert and oriented x3 no acute distress nontoxic-appearing appropriate affect.    Constitutional: Vital signs are examined and stable.  Resp: No signs of labored breathing                 LLE: -Skin patient has a 20 cm laceration transverse across the front of the knee with open exposed bone extending into the previous knee joint.  There is no obvious gross contamination although GA class 3A versus B.           -MSK:EHL/FHL, Gastroc/Tib  anterior Strength 5/5           -Neuro:  Sensation grossly intact           -Lymphatic: No signs of lymphadenopathy           -CV: Capillary refill is less than 2 seconds. DP/PT pulses 2/4. Compartments soft and compressible                          Significant Labs: All pertinent labs within the past 24 hours have been reviewed.  Recent Lab Results     None           Significant Imaging: I have reviewed all pertinent imaging results/findings.  No results found.     Assessment/Plan:     Active Diagnoses:    Diagnosis Date Noted POA    Displaced fracture of lateral condyle of left femur, initial encounter for open fracture type IIIA, IIIB, or IIIC [S72.422C] 08/24/2022 Unknown    Type III open displaced fracture of medial condyle of left tibia with routine healing [S82.132F] 08/24/2022 Not Applicable      Problems Resolved During this Admission:       Patient has a grade 3A versus B open distal femur proximal tibia.  Patient had a previous malunion possible fusion in this area.  Due to the large open nature and to soft tissue classification patient meets emergent surgical indications.  We have notified the OR we will get a surgical team ready a immediately for operative evaluation incision and drainage and external fixation.  Patient was able to give verbal consent for this right now we will continue with orthopedic care later this week.      This note/OR report was created with the assistance of  voice recognition software or phone  dictation.  There may be transcription errors as a result of using this technology however minimal. Effort has been made to assure accuracy of transcription but any obvious errors or omissions should be clarified with the author of the document.       Shawn Chatterjee,    Orthopedic Trauma Surgery  [facility] - Emergency Dept

## 2022-08-24 NOTE — ED PROVIDER NOTES
Encounter Date: 8/24/2022    SCRIBE #1 NOTE: I, Apolinar Langford, am scribing for, and in the presence of,  Brandyn Roman MD. I have scribed the following portions of the note - Other sections scribed: HPI, ROS, PE.       History     Chief Complaint   Patient presents with    Knee Injury     Pt to ER via [facility] for knee pain.  Pt slipped and fell and landed on left knee (previous replacement).  Knee is open.      63 y/o male with hx of psychiatric problems and HTN presents to Ed c/o injury to his left knee onset 40 min PTA. Per EMS, pt fell against steps. EMS reports large laceration to left knee. EMS reports 100 units of fentanyl en route. Pt states he is on blood thinners. Pt deneis LOC or any other injury.    The history is provided by the patient and the EMS personnel. No  was used.   Knee Injury  This is a new problem. The current episode started less than 1 hour ago. The problem occurs constantly. The problem has not changed since onset.Pertinent negatives include no chest pain, no abdominal pain, no headaches and no shortness of breath. The symptoms are aggravated by standing, walking and bending. The symptoms are relieved by narcotics.     Review of patient's allergies indicates:   Allergen Reactions    Chlorpromazine      Other reaction(s): Chlorpromazine, Unknown - See comments    Fluphenazine      Other reaction(s): Fluphenazine, Unknown - See comments    Trazodone      Other reaction(s): Trazodone, Unknown - See comments     Past Medical History:   Diagnosis Date    Anxiety     Hypertension      History reviewed. No pertinent surgical history.  Family History   Family history unknown: Yes     Social History     Tobacco Use    Smoking status: Never Smoker    Smokeless tobacco: Never Used   Substance Use Topics    Alcohol use: Not Currently     Review of Systems   Constitutional: Negative for chills, fatigue and fever.   HENT: Negative for congestion and sore throat.     Eyes: Negative for visual disturbance.   Respiratory: Negative for cough and shortness of breath.    Cardiovascular: Negative for chest pain.   Gastrointestinal: Negative for abdominal pain, diarrhea, nausea and vomiting.   Genitourinary: Negative for dysuria.   Musculoskeletal: Negative for myalgias.        Left knee pain   Skin: Positive for wound (Left knee). Negative for rash.   Neurological: Negative for weakness, numbness and headaches.   All other systems reviewed and are negative.      Physical Exam     Initial Vitals [08/24/22 1801]   BP Pulse Resp Temp SpO2   115/63 92 18 97.5 °F (36.4 °C) 97 %      MAP       --             Physical Exam    Constitutional: He appears well-developed and well-nourished.   HENT:   Head: Normocephalic and atraumatic.   Mouth/Throat: Oropharynx is clear and moist.   Eyes: Pupils are equal, round, and reactive to light.   Neck: Neck supple.   Normal range of motion.  Cardiovascular: Normal rate, regular rhythm, normal heart sounds and intact distal pulses.   2+ radial pulse   Pulmonary/Chest: Breath sounds normal. No respiratory distress.   Abdominal: Abdomen is soft. Bowel sounds are normal. There is no abdominal tenderness. There is no rebound and no guarding.   Musculoskeletal:         General: No tenderness or edema. Normal range of motion.      Cervical back: Normal range of motion and neck supple.     Neurological: He is alert and oriented to person, place, and time. He has normal strength. No sensory deficit. GCS score is 15. GCS eye subscore is 4. GCS verbal subscore is 5. GCS motor subscore is 6.   Distal motor function of left extremity intact   Skin: Skin is warm and dry. Capillary refill takes less than 2 seconds. Laceration (20 cm to anterior aspect of left knee with exposed bone, mild bleeding) noted.   Prior scar to left knee,          ED Course   Procedures  Labs Reviewed   APTT   CBC W/ AUTO DIFFERENTIAL    Narrative:     The following orders were created for  panel order CBC auto differential.  Procedure                               Abnormality         Status                     ---------                               -----------         ------                     CBC with Differential[[id number]]                            In process                   Please view results for these tests on the individual orders.   COMPREHENSIVE METABOLIC PANEL   PROTIME-INR   CBC WITH DIFFERENTIAL   TYPE & SCREEN          Imaging Results          X-Ray Knee 1 or 2 View Left (In process)                X-Ray Tibia Fibula 2 View Left (In process)                X-Ray Femur 2 View Left (In process)                  Medications   HYDROmorphone (PF) injection 0.5 mg (has no administration in time range)   ceFAZolin injection 2 g (2 g Intravenous Given 8/24/22 1839)   Tdap (BOOSTRIX) vaccine injection 0.5 mL (0.5 mLs Intramuscular Given 8/24/22 1840)   0.9%  NaCl infusion (1,000 mLs Intravenous New Bag 8/24/22 1839)     Medical Decision Making:   Differential Diagnosis:   Open fracture of left knee, open joint, tibia fracture  Clinical Tests:   Radiological Study: Reviewed and Ordered  ED Management:  Dr. Chatterjee presented very quickly after patient's arrival evaluated the patient will take him to surgery.  Patient given IV antibiotics, labs ordered in the emergency department.          Scribe Attestation:   Scribe #1: I performed the above scribed service and the documentation accurately describes the services I performed. I attest to the accuracy of the note.    Attending Attestation:           Physician Attestation for Scribe:  Physician Attestation Statement for Scribe #1: I, Brandyn Roman MD, reviewed documentation, as scribed by Apolinar Langford in my presence, and it is both accurate and complete.             ED Course as of 08/24/22 1845   Wed Aug 24, 2022   1815 Call and consult: Dr. Sen Escobar [BG]      ED Course User Index  [BG] Apolinar Langford             Clinical Impression:    Final diagnoses:  [W19.XXXA] Fall  [S82.838C] Type III open displaced bicondylar fracture of left tibia, initial encounter          ED Disposition Condition    Admit               Brandyn Roman MD  08/24/22 2257

## 2022-08-25 LAB
ANION GAP SERPL CALC-SCNC: 7 MEQ/L
ANION GAP SERPL CALC-SCNC: 9 MEQ/L
BUN SERPL-MCNC: 8.1 MG/DL (ref 8.4–25.7)
BUN SERPL-MCNC: 8.6 MG/DL (ref 8.4–25.7)
CALCIUM SERPL-MCNC: 9.3 MG/DL (ref 8.8–10)
CALCIUM SERPL-MCNC: 9.9 MG/DL (ref 8.8–10)
CHLORIDE SERPL-SCNC: 96 MMOL/L (ref 98–107)
CHLORIDE SERPL-SCNC: 98 MMOL/L (ref 98–107)
CO2 SERPL-SCNC: 20 MMOL/L (ref 23–31)
CO2 SERPL-SCNC: 25 MMOL/L (ref 23–31)
CREAT SERPL-MCNC: 0.83 MG/DL (ref 0.73–1.18)
CREAT SERPL-MCNC: 0.95 MG/DL (ref 0.73–1.18)
CREAT/UREA NIT SERPL: 10
CREAT/UREA NIT SERPL: 9
GFR SERPLBLD CREATININE-BSD FMLA CKD-EPI: >60 MLS/MIN/1.73/M2
GFR SERPLBLD CREATININE-BSD FMLA CKD-EPI: >60 MLS/MIN/1.73/M2
GLUCOSE SERPL-MCNC: 144 MG/DL (ref 82–115)
GLUCOSE SERPL-MCNC: 86 MG/DL (ref 82–115)
POTASSIUM SERPL-SCNC: 3.8 MMOL/L (ref 3.5–5.1)
POTASSIUM SERPL-SCNC: 4 MMOL/L (ref 3.5–5.1)
SARS-COV-2 RDRP RESP QL NAA+PROBE: NEGATIVE
SODIUM SERPL-SCNC: 125 MMOL/L (ref 136–145)
SODIUM SERPL-SCNC: 130 MMOL/L (ref 136–145)

## 2022-08-25 PROCEDURE — 97162 PT EVAL MOD COMPLEX 30 MIN: CPT

## 2022-08-25 PROCEDURE — 87635 SARS-COV-2 COVID-19 AMP PRB: CPT | Performed by: EMERGENCY MEDICINE

## 2022-08-25 PROCEDURE — 25500020 PHARM REV CODE 255: Performed by: ORTHOPAEDIC SURGERY

## 2022-08-25 PROCEDURE — 63600175 PHARM REV CODE 636 W HCPCS: Performed by: ORTHOPAEDIC SURGERY

## 2022-08-25 PROCEDURE — 25000003 PHARM REV CODE 250: Performed by: NURSE PRACTITIONER

## 2022-08-25 PROCEDURE — 80048 BASIC METABOLIC PNL TOTAL CA: CPT | Performed by: HOSPITALIST

## 2022-08-25 PROCEDURE — 11000001 HC ACUTE MED/SURG PRIVATE ROOM

## 2022-08-25 PROCEDURE — 97166 OT EVAL MOD COMPLEX 45 MIN: CPT

## 2022-08-25 PROCEDURE — 25000003 PHARM REV CODE 250: Performed by: ORTHOPAEDIC SURGERY

## 2022-08-25 PROCEDURE — 36415 COLL VENOUS BLD VENIPUNCTURE: CPT | Performed by: NURSE PRACTITIONER

## 2022-08-25 PROCEDURE — 80048 BASIC METABOLIC PNL TOTAL CA: CPT | Performed by: NURSE PRACTITIONER

## 2022-08-25 PROCEDURE — 36415 COLL VENOUS BLD VENIPUNCTURE: CPT | Performed by: HOSPITALIST

## 2022-08-25 RX ORDER — HYDRALAZINE HYDROCHLORIDE 20 MG/ML
10 INJECTION INTRAMUSCULAR; INTRAVENOUS EVERY 4 HOURS PRN
Status: DISCONTINUED | OUTPATIENT
Start: 2022-08-25 | End: 2022-09-28 | Stop reason: HOSPADM

## 2022-08-25 RX ORDER — SODIUM CHLORIDE 9 MG/ML
INJECTION, SOLUTION INTRAVENOUS CONTINUOUS
Status: DISCONTINUED | OUTPATIENT
Start: 2022-08-25 | End: 2022-08-26

## 2022-08-25 RX ADMIN — CEFAZOLIN SODIUM 2 G: 2 SOLUTION INTRAVENOUS at 02:08

## 2022-08-25 RX ADMIN — IOPAMIDOL 100 ML: 755 INJECTION, SOLUTION INTRAVENOUS at 01:08

## 2022-08-25 RX ADMIN — OXYCODONE HYDROCHLORIDE AND ACETAMINOPHEN 1 TABLET: 5; 325 TABLET ORAL at 05:08

## 2022-08-25 RX ADMIN — OXYCODONE HYDROCHLORIDE AND ACETAMINOPHEN 1 TABLET: 5; 325 TABLET ORAL at 03:08

## 2022-08-25 RX ADMIN — CEFAZOLIN SODIUM 2 G: 2 SOLUTION INTRAVENOUS at 03:08

## 2022-08-25 RX ADMIN — OXYCODONE HYDROCHLORIDE AND ACETAMINOPHEN 1 TABLET: 5; 325 TABLET ORAL at 09:08

## 2022-08-25 RX ADMIN — CEFAZOLIN SODIUM 2 G: 2 SOLUTION INTRAVENOUS at 09:08

## 2022-08-25 RX ADMIN — SODIUM CHLORIDE: 9 INJECTION, SOLUTION INTRAVENOUS at 02:08

## 2022-08-25 RX ADMIN — MUPIROCIN: 20 OINTMENT TOPICAL at 09:08

## 2022-08-25 RX ADMIN — ENOXAPARIN SODIUM 40 MG: 40 INJECTION SUBCUTANEOUS at 09:08

## 2022-08-25 RX ADMIN — MUPIROCIN: 20 OINTMENT TOPICAL at 08:08

## 2022-08-25 RX ADMIN — OXYCODONE HYDROCHLORIDE AND ACETAMINOPHEN 1 TABLET: 5; 325 TABLET ORAL at 08:08

## 2022-08-25 NOTE — OP NOTE
OPERATIVE REPORT      Patient: Brandyn Ty   : 1960    MRN: [id number]  Date: 2022      Surgeon:Shawn Chatterjee DO  Preoperative Diagnosis:  Left  open fracture of the distal femur proximal tibia  Postoperative Diagnosis: Same  Procedure:  Left knee Ex fix   Excisional debridement open fracture left knee-  Anesthesiologist: Ronald Romero MD  OR Staff: Circulator: Iris Finney RN  Radiology Technologist: RT Radha  Scrub Person: Siomara Hernandez RN  Implants:   Implant Name Type Inv. Item Serial No.  Lot No. LRB No. Used Action   CLAMP LG 6 POSITION MULTI-PIN - [id number]  CLAMP LG 6 POSITION MULTI-PIN  [employer]  Left 2 Implanted   POST FIXATOR EXTERAL 11MM SS - [id number]  POST FIXATOR EXTERAL 11MM SS  [employer]  Left 3 Implanted   SCREW SCHANZ 5MM X 200MM - [id number]  SCREW SCHANZ 5MM X 200MM  [employer]  Left 4 Implanted   CLAMP COMBINATION / LG EXT FIX - [id number]  CLAMP COMBINATION / LG EXT FIX  [employer]  Left 4 Implanted   SYNTHES 11.0MM CARBON FIBER RODS  CAT# [id number]    [employer]THES  Left 2 Implanted     EBL: 50cc  Complications: None  Disposition: To PACU, stable    Indications: Brandyn Ty is a 62 y.o. male a fracture of the left distal femur left proximal tibia through a previous union.  Patient has severe open injury with 20 cm laceration transverse across the front of the knee.  The procedure is indicated to protect neurovascular structures and provide stability to the knee.  The patient is awake and alert. After thorough discussion of the risks, benefits, expected outcomes, and alternatives to surgical intervention, the patient agreed to proceed with surgical treatment.  Specific risks discussed included, but were not limited to: superficial or deep infection, wound healing complications, DVT/PE, significant bleeding requiring transfusion, amputation, damage to named anatomic structures in the immediate area including named neurovascular  structures, infection, nonunion, malunion and general risks of anesthesia.  The patient voiced understanding and written as well as verbal consent was obtained by myself prior to the procedure.    Procedure Note:  The patient was brought back to the OR and placed supine on the OR table. After successful induction of anesthesia by anesthesia staff, the patient was positioned in the supine position and all bony prominences were padded appropriately.  The surgical field was then provisionally cleansed and then prepped and draped in the usual sterile fashion.    At this time a time-out was performed, with the correct patient, site, and procedure identified.  The universal time out as well as sign your site protocols were followed.  Preoperative antibiotics were verified as administered.       My attention is drawn to the anterior aspect of the left knee patient has a 20 cm transverse laceration perpendicular to previous incision lines with an open fracture of the distal femur proximal tibia.  No obvious contamination patient underwent excisional debridement of open fracture including removing bone fragments subcutaneous tissue fascia periosteum with a 15 blade rongeur.  Using a curette and copious amounts irrigation and then wash the wound followed by a Betadine wash followed by again copious irrigation.  I then placed vancomycin deep in the wound.  I then closed the wound in layered fashion.  With a small area on the anterior aspect of the knee the with skin adhesion was unable to be closed this area was then closed with Dermabond skin glue.    My attention is drawn to the knee fusion site which we then reduced under intraoperative fluoroscopy in using open means followed by external fixation placement into the femur and tibia using intraoperative fluoroscopy.  We then compressed the fracture fragments followed by tightening of the external fixator provide and compression across the knee fusion    The incision(s)  was/were then irrigated using copious sterile saline and then vancomyocin was added to the wound bed for prophylaxis. The surgical wound was closed in layered fashion.  The surgical site(s) was/were were sterilely cleansed and dressed.    The patient was then subsequently transferred to to PACU in a stable condition.    All sponge and needle counts were correct at the end of the case.  I was present and participated in all aspects of the procedure.    Prognosis:  The patient will be kept NWB.  Pt will need revision fusion.      This note/OR report was created with the assistance of  voice recognition software or phone  dictation.  There may be transcription errors as a result of using this technology however minimal. Effort has been made to assure accuracy of transcription but any obvious errors or omissions should be clarified with the author of the document.       Shawn Chatterjee, DO  Orthopedic Trauma Surgery

## 2022-08-25 NOTE — PLAN OF CARE
Problem: Occupational Therapy  Goal: Occupational Therapy Goal  Description: Goals to be met by: 9/8    Patient will increase functional independence with ADLs by performing:    LE Dressing with Modified [place].  Grooming while standing at sink with Modified [place].  Toileting from toilet with Modified [place] for hygiene and clothing management.   Toilet transfer to toilet with Modified [place].    Outcome: Ongoing, Progressing

## 2022-08-25 NOTE — ANESTHESIA POSTPROCEDURE EVALUATION
Anesthesia Post Evaluation    Patient: Brandyn Ty    Procedure(s) Performed: Procedure(s) (LRB):  APPLICATION, EXTERNAL FIXATION DEVICE, LARGE, TIBIA (Left)    Final Anesthesia Type: general      Patient location during evaluation: PACU  Patient participation: Yes- Able to Participate  Level of consciousness: awake and alert  Post-procedure vital signs: reviewed and stable  Pain management: adequate  Airway patency: patent  MARCIO mitigation strategies: Multimodal analgesia  PONV status at discharge: No PONV  Anesthetic complications: no      Cardiovascular status: blood pressure returned to baseline and hemodynamically stable  Respiratory status: unassisted  Hydration status: euvolemic  Follow-up not needed.          Vitals Value Taken Time   /70 08/24/22 2051   Temp 36.5 °C (97.7 °F) 08/24/22 2030   Pulse 103 08/24/22 2057   Resp 13 08/24/22 2057   SpO2 99 % 08/24/22 2057   Vitals shown include unvalidated device data.      No case tracking events are documented in the log.      Pain/Mariely Score: Pain Rating Prior to Med Admin: 10 (8/24/2022  6:45 PM)  Mariely Score: 9 (8/24/2022  8:45 PM)

## 2022-08-25 NOTE — ED TRIAGE NOTES
Patient presents to the ED via EMS following fall on steps at home. Pt hit his left knee onto a step.  Past hx of left knee surgery.  Left knee is open with visible bone. Bleeding controlled.  No LOC. Pt is on blood thinners. Neurovascular intact.

## 2022-08-25 NOTE — NURSING
"Pt unable to give complete home med rec. He states that he uses "[facility]" in [place], [place].Unable to call on night shift to get med info, they open at 9am. Will pass along to next shift to get it updated. Phone number [phone].  "

## 2022-08-25 NOTE — CONSULTS
[facility] Hospital Medicine   Consult  Note      Patient Name: Brandyn Ty  : 1960  MRN: [id number]  Patient Class: IP- Inpatient   Admission Date: 2022   Length of Stay: 0  Attending Physician: Dr. Chatterjee  PCP: Elizabeth Marin MD  Source of history: Patient, patient's family, and EMR.   Code status: Full      Chief Complaint   Knee Injury (Pt to ER via [facility] for knee pain.  Pt slipped and fell and landed on left knee (previous replacement).  Knee is open. )      History of Present Illness   Mr. Ty is a 63 yo AAM with hx Schizophrenia and HTN who presents to the ED after a ground level fall landing on his left knee. He is an extremely poor historian. He sustained a displaced open femur fracture grade 3A vs B.  that warranted immediate surgical intervention. Dr. Chatterjee was consulted and pt underwent I&D and external fixation. The Hospitalist Service was consulted for medical management.     Pt seen and examined postoperatively, he is a poor historian, states he lives at home and takes his medications as scheduled. He denies complaints except for some expected pain.     ROS   Except as documented, all other systems reviewed and negative     Past Medical History   · Essential HTN  · HLD  · Schizophrenia, unspecified type    Past Surgical History   · Left Knee Surgery, unknown type    Social History   · Denies tobacco, alcohol or illicit drug use    Family History   Reviewed and noncontributory    Allergies   Chlorpromazine, Fluphenazine, and Trazodone    Home Medications     Medication Sig   acetaminophen (TYLENOL) 500 MG tablet Take 500 mg by mouth 2 (two) times daily.   amantadine HCl (SYMMETREL) 100 mg capsule amantadine HCl 100 mg capsule   PO BiD   amoxicillin-clavulanate 875-125mg (AUGMENTIN) 875-125 mg per tablet Take 1 tablet by mouth every 12 (twelve) hours.  Patient not taking: Reported on 2020   benztropine (COGENTIN) 1 MG tablet benztropine 1 mg  tablet   dicyclomine (BENTYL) 20 mg tablet TAKE 1 TABLET BY MOUTH 4 TIMES DAILY   DULoxetine (CYMBALTA) 30 MG capsule Take 30 mg by mouth once daily.   fluticasone propionate (FLONASE) 50 mcg/actuation nasal spray 1 spray by Each Nostril route 2 (two) times daily.   haloperidol (HALDOL) 5 MG tablet    naproxen (NAPROSYN) 500 MG tablet Take 500 mg by mouth 2 (two) times daily as needed.   simvastatin (ZOCOR) 20 MG tablet Take 1 tablet (20 mg total) by mouth every evening.        Inpatient Medications   Scheduled Meds   [START ON 8/25/2022] ceFAZolin (ANCEF) IVPB  2 g Intravenous Q6H    [START ON 8/25/2022] enoxaparin  40 mg Subcutaneous Daily    mupirocin   Nasal BID     Continuous Infusions    PRN Meds  HYDROmorphone, morphine, ondansetron, ondansetron, oxyCODONE-acetaminophen, sodium chloride 0.9%, sodium chloride 0.9%, sodium chloride 0.9%    Physical Exam   Vital Signs  Temp:  [97.5 °F (36.4 °C)-97.7 °F (36.5 °C)]   Pulse:  []   Resp:  [12-19]   BP: (110-131)/(59-96)   SpO2:  [96 %-100 %]       General: Appears comfortable  HEENT: NC/AT  Neck:  No JVD  Chest: CTABL  CVS: Regular rhythm. Normal S1/S2.  Abdomen: nondistended, normoactive BS, soft and non-tender.  MSK: No obvious deformity or joint swelling  Skin: Warm and dry  Neuro: AAOx3, no focal neurological deficit  Psych: Cooperative    Labs     Recent Labs     08/24/22 1827   WBC 16.4*   RBC 4.65*   HGB 13.7*   HCT 39.6*   MCV 85.2   MCH 29.5   MCHC 34.6   RDW 12.7        No results for input(s): LACTIC in the last 72 hours.  Recent Labs     08/24/22 1827   INR 1.05     No results for input(s): HGBA1C, CHOL, TRIG, LDL, VLDL, HDL in the last 72 hours.   Recent Labs     08/24/22 1827   *   K 4.0   CHLORIDE 89*   CO2 23   BUN 9.4   CREATININE 0.91   GLUCOSE 104   CALCIUM 9.8   ALBUMIN 4.2   GLOBULIN 4.6*   ALKPHOS 71   ALT 19   AST 25   BILITOT 0.6     No results for input(s): BNP, CPK, TROPONINI in the last 72 hours.       Microbiology  Results (last 7 days)     ** No results found for the last 168 hours. **         Imaging     SURG FL Surgery Fluoro Usage   Final Result      X-Ray Tibia Fibula 2 View Left   Final Result      No orthopedic hardware.         Electronically signed by: Radha Valdivia   Date:    08/24/2022   Time:    18:55      X-Ray Knee 1 or 2 View Left   Final Result      Acute on chronic appearing deformity at the left knee.  There appears to be have been bony fusion across the joint space with acute superimposed fracture versus pseudoarticulation.  There appears to be an anterior laceration.  Correlate for open fracture.         Electronically signed by: Radha Valdivia   Date:    08/24/2022   Time:    18:55      X-Ray Femur 2 View Left   Final Result      Acute on chronic appearing deformity at the left knee.  There appears to be have been bony fusion across the joint space with acute superimposed fracture versus pseudoarticulation.  There appears to be an anterior laceration.  Correlate for open fracture.         Electronically signed by: Radha Valdivia   Date:    08/24/2022   Time:    18:55      CTA Lower Extremity Left    (Results Pending)       Assessment & Plan   Hypovolemic Hyponatremia (vs side effect of Haldol)   Schizophrenia, unspecified  Essential HTN  Open Distal Left Femur Fracture s/p I&D and ORIF today 8/24      PLAN:  - IVFs given, repeating labs now, avoid overcorrection   - PRN analgecics  - Will resume home meds as soon as nurse is able to verify with pt home pharmacy  - PRN antihypertensives   - CBC, CMP, Mag, Phos  - VTE Prophylaxis:    I, Shonda Burgos, FNP-C have discussed this patients case with Dr. Levi who agrees with the diagnosis and treatment plan.    __________________________________________________________________  I, Dr. Marco A Levi assumed care of this patient.  For the patient encounter, I performed the substantive portion of the visit, I reviewed the NPPA documentation, treatment  plan, and medical decision making.  I had face to face time with this patient.  I have personally reviewed the labs and test results that are presently available. I have reviewed or attempted to review medical records based upon their availability. If patient was admitted under observational status it is with my approval.      62-year-old male with history of schizophrenia and hypertension presents with a fall in a open distal left femur fracture; workup showed evidence of hyponatremia.  On exam he appears euvolemic at this time, left lower extremity in surgical bandage, cardiovascular regular rate and rhythm and agree with above exam.  Continue postoperative care per Orthopedic surgery.  Regarding his hyponatremia, repeating laboratory work now, avoid correcting sodium level .over 8 mEq in 24 hours.  Awaiting home med reconciliation process.    Time seen: 11PM   Marco A Levi MD

## 2022-08-25 NOTE — PLAN OF CARE
Goals to be met by: 2022     Patient will increase functional independence with mobility by performin. Supine to sit with Modified [place]  2. Sit to supine with Modified [place]  3. Sit to stand transfer with Stand-by Assistance  4. Gait  x 200 feet with Modified [place] using Rolling Walker.

## 2022-08-25 NOTE — PT/OT/SLP EVAL
Occupational Therapy   Evaluation    Name: Brandyn Ty  MRN: [id number]  Admitting Diagnosis:  Fall: L tibia fx, L femur fx s/p ex-fix   Recent Surgery: Procedure(s) (LRB):  APPLICATION, EXTERNAL FIXATION DEVICE, LARGE, TIBIA (Left) 1 Day Post-Op    Recommendations:     Discharge Recommendations: rehabilitation facility, nursing facility, skilled  Discharge Equipment Recommendations:  hip kit, walker, rolling  Barriers to discharge:  Other (Comment) (Severity of deficits)    Assessment:     Brandyn Ty is a 62 y.o. male with a medical diagnosis of   Fall: L tibia fx, L femur fx s/p ex-fix. Performance deficits affecting function: impaired functional mobility, decreased safety awareness, impaired endurance, pain, impaired balance, impaired self care skills, orthopedic precautions.      Rehab Prognosis: Good; patient would benefit from acute skilled OT services to address these deficits and reach maximum level of function.       Plan:     Patient to be seen 5 x/week, daily to address the above listed problems via self-care/home management, therapeutic activities, therapeutic exercises  · Plan of Care Expires:    · Plan of Care Reviewed with: patient    Subjective     Chief Complaint: Pain in LLE   Patient/Family Comments/goals: To return to PLOF     Occupational Profile:  Living Environment: Pt reported he lives in a group home c 4 other men. States there a 4 steps to enter c a R rail. Bathroom has a tub and walk in shower.   Previous level of function: Pt reported he IND c ADLs and ambulates c a cane at baseline.   Roles and Routines: Reported he enjoys exercising.   Equipment Used at Home:  cane, quad  Assistance upon Discharge: Pt would benefit from placement at d/c.     Pain/Comfort:  · Pain Rating 1: 10/10  · Location - Side 1: Left  · Location 1: leg  · Pain Addressed 1: Reposition    Patients cultural, spiritual, Gnosticism conflicts given the current situation: no    Objective:      Communicated with: RN prior to session.  Patient found supine with external fixator upon OT entry to room.    General Precautions: Standard, fall   Orthopedic Precautions:LLE non weight bearing   Braces: N/A  Respiratory Status: Room air    Occupational Performance:    Bed Mobility:    · Patient completed Scooting/Bridging with stand by assistance  · Patient completed Supine to Sit with minimum assistance    Functional Mobility/Transfers:  · Patient completed Sit <> Stand Transfer with minimum assistance  with  rolling walker   · Patient completed Bed <> Chair Transfer using hop to technique with minimum assistance with rolling walker  · Functional Mobility: Pt able to hop x2 using RW towards chair; LOB x1 c Min A to correct     Activities of Daily Living:  · Lower Body Dressing: maximal assistance .    Cognitive/Visual Perceptual:  Cognitive/Psychosocial Skills:     -       Oriented to: Person and Place   -       Mood/Affect/Coping skills/emotional control: Cooperative and Flat affect    Physical Exam:  Upper Extremity Strength:    -       Right Upper Extremity: WFL  -       Left Upper Extremity: WNL    Treatment & Education:  Educated pt on OT POC and orthopedic pxns- pt verbalized and demonstrated understanding.   Patient left up in chair with all lines intact, call button in reach and chair alarm on    GOALS:   Multidisciplinary Problems     Occupational Therapy Goals        Problem: Occupational Therapy    Goal Priority Disciplines Outcome Interventions   Occupational Therapy Goal     OT, PT/OT Ongoing, Progressing    Description: Goals to be met by: 9/8    Patient will increase functional independence with ADLs by performing:    LE Dressing with Modified [place].  Grooming while standing at sink with Modified [place].  Toileting from toilet with Modified [place] for hygiene and clothing management.   Toilet transfer to toilet with Modified [place].                     History:     Past  Medical History:   Diagnosis Date    Anxiety     Hypertension        Past Surgical History:   Procedure Laterality Date    APPLICATION OF LARGE EXTERNAL FIXATION DEVICE TO TIBIA Left 8/24/2022    Procedure: APPLICATION, EXTERNAL FIXATION DEVICE, LARGE, TIBIA;  Surgeon: Shawn Chatterjee DO;  Location: [facility];  Service: Orthopedics;  Laterality: Left;       Time Tracking:     OT Date of Treatment: 09/09/22  OT Start Time: 0849  OT Stop Time: 0905  OT Total Time (min): 16 min    Billable Minutes:Evaluation Moderate complexity    8/25/2022

## 2022-08-25 NOTE — PT/OT/SLP EVAL
Physical Therapy Evaluation    Patient Name:  Brandyn Ty   MRN:  [id number]    Recommendations:     Discharge Recommendations:  rehabilitation facility   Discharge Equipment Recommendations: walker, rolling   Barriers to discharge: acuity of illness    Assessment:     Brandyn Ty is a 62 y.o. male admitted with a medical diagnosis of ground level fall w/ an open left femur fracture.  He presents with the following impairments/functional limitations:  weakness, impaired endurance, impaired self care skills, impaired functional mobility, gait instability, decreased lower extremity function, pain, decreased ROM, orthopedic precautions.    Rehab Prognosis: Good; patient would benefit from acute skilled PT services to address these deficits and reach maximum level of function.    Recent Surgery: Procedure(s) (LRB):  APPLICATION, EXTERNAL FIXATION DEVICE, LARGE, TIBIA (Left) 1 Day Post-Op    Plan:     During this hospitalization, patient to be seen BID (/daily) to address the identified rehab impairments via gait training, therapeutic activities, therapeutic exercises, neuromuscular re-education and progress toward the following goals:    · Plan of Care Expires:  09/24/22    Subjective     Chief Complaint: pain in LLE  Patient/Family Comments/goals: to be independent  Pain/Comfort:  · Pain Rating 1: 10/10  · Location - Side 1: Left  · Location - Orientation 1: lower  · Location 1: leg    Patients cultural, spiritual, Rastafari conflicts given the current situation: no    Living Environment:  Pt lives w/ 4 other people in a SL home with 4 steps to enter with bilateral handrails.   Prior to admission, patients level of function was independent.  Equipment used at home: cane, straight.  DME owned (not currently used): none.  Upon discharge, patient will have assistance from unknown.    Objective:     Communicated with RN prior to session.  Patient found HOB elevated with peripheral IV, external  fixator  upon PT entry to room.    General Precautions: Standard,     Orthopedic Precautions:LLE non weight bearing   Braces:    Respiratory Status: Room air    Exams:  · Cognitive Exam:  Patient is oriented to Person, Place, Time and Situation  · RLE ROM: WFL  · RLE Strength: WFL  · LLE ROM: moderately limited secondary to external fixation  · LLE Strength: grossly 3-/5 for hip and ankle    Functional Mobility:  · Bed Mobility:     · Supine to Sit: minimum assistance  · Sit to Supine: minimum assistance  · Transfers:     · Sit to Stand:  minimum assistance with rolling walker  · Gait: pt hopped 3 times forward and 2 times backward w/ RW and [place]      AM-PAC 6 CLICK MOBILITY  Total Score:15     Patient left HOB elevated with all lines intact, call button in reach and RN notified.    GOALS:   Multidisciplinary Problems     Physical Therapy Goals        Problem: Physical Therapy    Goal Priority Disciplines Outcome Goal Variances Interventions   Physical Therapy Goal     PT, PT/OT      Description: Goals to be met by: 2022     Patient will increase functional independence with mobility by performin. Supine to sit with Modified [place]  2. Sit to supine with Modified [place]  3. Sit to stand transfer with Stand-by Assistance  4. Gait  x 200 feet with Modified [place] using Rolling Walker.                      History:     Past Medical History:   Diagnosis Date    Anxiety     Hypertension        Past Surgical History:   Procedure Laterality Date    APPLICATION OF LARGE EXTERNAL FIXATION DEVICE TO TIBIA Left 2022    Procedure: APPLICATION, EXTERNAL FIXATION DEVICE, LARGE, TIBIA;  Surgeon: Shawn Chatterjee DO;  Location: [facility];  Service: Orthopedics;  Laterality: Left;       Time Tracking:     PT Received On: 22  PT Start Time: 818     PT Stop Time: 826  PT Total Time (min): 8 min     Billable Minutes: Evaluation 8 minutes      2022

## 2022-08-25 NOTE — TRANSFER OF CARE
Anesthesia Transfer of Care Note    Patient: Branydn Ty    Procedure(s) Performed: Procedure(s) (LRB):  APPLICATION, EXTERNAL FIXATION DEVICE, LARGE, TIBIA (Left)    Patient location: PACU    Anesthesia Type: general    Transport from OR: Transported from OR on room air with adequate spontaneous ventilation    Post pain: pain needs to be addressed    Post assessment: no apparent anesthetic complications and tolerated procedure well    Post vital signs: stable    Level of consciousness: awake and alert    Nausea/Vomiting: no nausea/vomiting    Complications: none    Transfer of care protocol was followed      Last vitals:   Visit Vitals  BP (!) 125/96 (BP Location: Left arm)   Pulse 98   Temp 36.4 °C (97.5 °F) (Skin)   Resp 18   SpO2 97%

## 2022-08-25 NOTE — PROGRESS NOTES
Advised brother colette of room number and status of brother.  No questions asked no concerns voiced.    Lower back pain

## 2022-08-25 NOTE — PROGRESS NOTES
[facility] Progress [facility]        Chief Complaint: Inpatient Follow-up for consult for med mgmt    HPI:   61 yo AAM with hx Schizophrenia and HTN who presents to the ED after a ground level fall landing on his left knee. He is an extremely poor historian. He sustained a displaced open femur fracture grade 3A vs B.  that warranted immediate surgical intervention. Dr. Chatterjee was consulted and pt underwent I&D and external fixation. The Hospitalist Service was consulted for medical management.      Pt seen and examined postoperatively, he is a poor historian, states he lives at home and takes his medications as scheduled. He denies complaints except for some expected pain.     Interval Hx:  Patient morning.  Pain well controlled.  Mental status at baseline    Objective/physical exam:  General: In no acute distress, afebrile  Chest: Clear to auscultation bilaterally  Heart: RRR, +S1, S2, no appreciable murmur  Abdomen: Soft, nontender, BS +  Neurologic: Alert and oriented x4, Cranial nerve II-XII intact, Strength 5/5 in all 4 extremities    VITAL SIGNS: 24 HRS MIN & MAX LAST   Temp  Min: 97.5 °F (36.4 °C)  Max: 97.9 °F (36.6 °C) 97.9 °F (36.6 °C)   BP  Min: 110/59  Max: 131/67 116/79   Pulse  Min: 89  Max: 106  89   Resp  Min: 12  Max: 19 18   SpO2  Min: 96 %  Max: 100 % 96 %       Recent Labs   Lab 08/24/22 1827   WBC 16.4*   RBC 4.65*   HGB 13.7*   HCT 39.6*   MCV 85.2   MCH 29.5   MCHC 34.6   RDW 12.7      MPV 9.2       Recent Labs   Lab 08/24/22 1827 08/25/22  0425   * 125*   K 4.0 4.0   CO2 23 20*   BUN 9.4 8.1*   CREATININE 0.91 0.83   CALCIUM 9.8 9.3   ALBUMIN 4.2  --    ALKPHOS 71  --    ALT 19  --    AST 25  --    BILITOT 0.6  --           Microbiology Results (last 7 days)     ** No results found for the last 168 hours. **           See below for Radiology    Scheduled Med:   ceFAZolin (ANCEF) IVPB  2 g Intravenous Q6H    enoxaparin  40 mg  Subcutaneous Daily    mupirocin   Nasal BID        Continuous Infusions:   sodium chloride 0.9% 100 mL/hr at 08/25/22 0220        PRN Meds:  hydrALAZINE, HYDROmorphone, morphine, ondansetron, ondansetron, oxyCODONE-acetaminophen, sodium chloride 0.9%, sodium chloride 0.9%, sodium chloride 0.9%       Assessment/Plan:   Hypovolemic Hyponatremia (vs side effect of Haldol)   Schizophrenia, unspecified  Essential HTN  Open Distal Left Femur Fracture s/p I&D and ORIF 8/24     Sodium somewhat improved this morning.  Up to 125.    Continue with normal saline at 100 cc/hour.  Will continue to trend.  Avoid over-correction.  Leukocytosis likely reactive.  Low suspicion for acute infectious process.  He is on perioperative Ancef.  Need to review his home medications and restart them as appropriate.  His vital signs are currently stable.  He is not on any blood pressure medications at this point.  Will continue to follow along with you    Patient condition:  Stable    Anticipated discharge and Disposition: TBD      All diagnosis and differential diagnosis have been reviewed; assessment and plan has been documented; I have personally reviewed the labs and test results that are presently available; I have reviewed the patients medication list; I have reviewed the consulting providers response and recommendations. I have reviewed or attempted to review medical records based upon their availability    All of the patient's questions have been  addressed and answered. Patient's is agreeable to the above stated plan. I will continue to monitor closely and make adjustments to medical management as needed.  _____________________________________________________________________      Radiology:  CTA Lower Extremity Left  START OF REPORT:  Technique: CT angiogram of the left lower extremity was performed with intravenous contrast with direct axial, sagittal, and, coronal images.    History: Patient has a grade 3A versus B open distal  femur proximal tibia. Patient had a previous malunion possible fusion in this area.    Findings:  Bones:  Hip: There is moderate degenerative change in the left hip with joint space reduction, subchondral sclerosis and marginal osteophytes.  Lower extremity bony structures: Orthopedic fixation hardware is seen in the left femur and tibia. There are old malunited fractures with varying degrees of deformity in the femur and tibia. There is acute appering fracture line seen in the lower femur on series 9 images 31-36. The proximal tibia at the intercondylar region shows medium sized irregular osseous defect with irregular margins and associated overlying soft tissue swelling with soft tissue emphysema. If this is not old surgical ennucleation with recent soft tissue intervenion or laceration raises concern for osteomyelitis with soft tissue ulceration.  Knee: There is severe degeneration with bone to bone articulation in tibio-femoral and ankylosis at proximal tibio-fibular joints.  Ankle: There is mild degenerative changes with joint space narrowing, marginal osteophytes and articular surface sclerosis at the ankle joint.  Iliac Arteries:  Left Common Iliac Artery: Unremarkable.  Left internal iliac artery: Scattered mild calcification with minimal stenosis.  Left Lower extremity arteries:  Superficial femoral artery: Unremarkable.  Popliteal artery: Unremarkable.  Trifurcation vessels:  Anterior tibial artery: Unremarkable.  Peroneal artery: Unremarkable.  Posterior tibial artery: The posterior tibial artery is partly obscured by the metallic artefacts due to surrounding implants. It shows patchy intermittent contrast filling along its course in the calf which could reflect moderate to severe stensosis. There is contrast refilling in the posterior tibial artery at the ankle and foot with present three vessel runoff to the left ankle and left foot. Correlate with clinical and laboratory findings as regards additional  evaluation and followup.    Miscellaneous: There is marked atrophy of muscles with fatty replacement in the anterior compartment of left thigh and anterolateral compartment of left calf region.    Impression:  1. Orthopedic fixation hardware is seen in the left femur and tibia. There are old malunited fractures with varying degrees of deformity in the femur and tibia. There is acute appering fracture line seen in the lower femur on series 9 images 31-36. The proximal tibia at the intercondylar region shows medium sized irregular osseous defect with irregular margins and associated overlying soft tissue swelling with soft tissue emphysema. If this is not old surgical ennucleation with recent soft tissue intervenion or laceration raises concern for osteomyelitis with soft tissue ulceration. Correlate clinically as regards additional evaluation and followup.  2. Details and other findings as discussed above.      Ethan Zuluaga MD   08/25/2022

## 2022-08-25 NOTE — ANESTHESIA PREPROCEDURE EVALUATION
08/24/2022  Brandyn Ty is a 62 y.o., male.      Pre-op Assessment    I have reviewed the Patient Summary Reports.     I have reviewed the Nursing Notes. I have reviewed the NPO Status.   I have reviewed the Medications.     Review of Systems  Anesthesia Hx:  No problems with previous Anesthesia    Hematology/Oncology:  Hematology Normal   Oncology Normal     Cardiovascular:   Hypertension    Pulmonary:  Pulmonary Normal    Hepatic/GI:   Liver Disease, Hepatitis    Musculoskeletal:  Musculoskeletal Normal    Neurological:  Neurology Normal    Endocrine:  Endocrine Normal    Dermatological:  Skin Normal    Psych:   Psychiatric History schizo         Physical Exam  General: Well nourished, Cooperative, Alert and Oriented    Airway:  Mallampati: II   Mouth Opening: Normal  TM Distance: Normal  Tongue: Normal  Neck ROM: Normal ROM    Dental:  Intact  Missing  Chest/Lungs:  Clear to auscultation    Heart:  Rate: Normal  Rhythm: Regular Rhythm        Anesthesia Plan  Type of Anesthesia, risks & benefits discussed:    Anesthesia Type: Gen ETT  Intra-op Monitoring Plan: Standard ASA Monitors  Post Op Pain Control Plan: multimodal analgesia  Induction:  IV and rapid sequence  Airway Plan: Direct  Informed Consent: Informed consent signed with the Patient and all parties understand the risks and agree with anesthesia plan.  All questions answered.   ASA Score: 3 Emergent  Day of Surgery Review of History & Physical: I have interviewed and examined the patient. I have reviewed the patient's H&P dated:     Ready For Surgery From Anesthesia Perspective.     .

## 2022-08-25 NOTE — PROGRESS NOTES
[facility] Neuro  Orthopedics  Progress Note    Patient Name: Brandyn Ty  MRN: [id number]  Admission Date: 8/24/2022  Hospital Length of Stay: 1 days  Attending Provider: Shawn Chatterjee DO  Primary Care Provider: Elizabeth Marin MD  Follow-up For: Procedure(s) (LRB):  APPLICATION, EXTERNAL FIXATION DEVICE, LARGE, TIBIA (Left)    Post-Operative Day: 1 Day Post-Op  Subjective:     Principal Problem:<principal problem not specified>    Principal Orthopedic Problem: 1 Day Post-Op ex fix left tibia with irrigation and debridement of open wound    Admission history: 61 yo AAM with hx Schizophrenia and HTN who presents to the ED after a ground level fall landing on his left knee. He is an extremely poor historian. He sustained a displaced open femur fracture grade 3A vs B.  that warranted immediate surgical intervention. Dr. Chatterjee was consulted and pt underwent I&D and external fixation. The Hospitalist Service was consulted for medical management.     Interval History: States that his pain is well controlled this morning. He understands that he will need to undergo another procedure to fuse his knee as he broke it at his previous fusion site. He has no complaints this morning. Hospital medicine is following along with us at this time. He denies numbness and tingling distally.     Review of patient's allergies indicates:   Allergen Reactions    Chlorpromazine      Other reaction(s): Chlorpromazine, Unknown - See comments    Fluphenazine      Other reaction(s): Fluphenazine, Unknown - See comments    Trazodone      Other reaction(s): Trazodone, Unknown - See comments       Current Facility-Administered Medications   Medication    0.9%  NaCl infusion    cefazolin (ANCEF) 2 gram in dextrose 5% 50 mL IVPB (premix)    enoxaparin injection 40 mg    hydrALAZINE injection 10 mg    HYDROmorphone (PF) injection 0.4 mg    morphine injection 4 mg    mupirocin 2 % ointment    ondansetron  "injection 4 mg    ondansetron injection 4 mg    oxyCODONE-acetaminophen 5-325 mg per tablet 1 tablet    sodium chloride 0.9% flush 10 mL    sodium chloride 0.9% flush 10 mL    sodium chloride 0.9% flush 10 mL     Objective:     Vital Signs (Most Recent):  Temp: 97.6 °F (36.4 °C) (08/25/22 0720)  Pulse: 89 (08/25/22 0720)  Resp: 19 (08/25/22 0720)  BP: 112/76 (08/25/22 0720)  SpO2: 99 % (08/25/22 0720) Vital Signs (24h Range):  Temp:  [97.5 °F (36.4 °C)-97.9 °F (36.6 °C)] 97.6 °F (36.4 °C)  Pulse:  [] 89  Resp:  [12-19] 19  SpO2:  [96 %-100 %] 99 %  BP: (110-131)/(59-96) 112/76     Weight: 83.9 kg (185 lb)  Height: 5' 10" (177.8 cm)  Body mass index is 26.54 kg/m².      Intake/Output Summary (Last 24 hours) at 8/25/2022 0839  Last data filed at 8/25/2022 0532  Gross per 24 hour   Intake 1000 ml   Output 400 ml   Net 600 ml       Physical Exam:   Musculoskeletal:     Left lower extremity: dressing is clean and dry without obvious signs of drainage ex-fix in place to the LLE; compartments are soft and compressible; able to flex and extend the ankle without pain, tolerates ROM at the hip; appropriate tenderness to palpation; dorsi/plantar flexes the foot; SILT distally; BCR distally; DP pulse palpation      Diagnostic Findings:   Significant Labs:   Recent Lab Results       08/25/22  0425   08/24/22  1827        Albumin/Globulin Ratio   0.9       Albumin   4.2       Alkaline Phosphatase   71       ALT   19       Anion Gap 9.0         aPTT   31.3  Comment: For Minimal Heparin Infusion, the goal aPTT 64-85 seconds corresponds to an anti-Xa of 0.3-0.5.    For Low Intensity and High Intensity Heparin, the goal aPTT  seconds corresponds to an anti-Xa of 0.3-0.7       AST   25       Baso #   0.06       Basophil %   0.4       BILIRUBIN TOTAL   0.6       BUN 8.1   9.4       BUN/CREAT RATIO 10         Calcium 9.3   9.8       Chloride 96   89       CO2 20   23       Creatinine 0.83   0.91       eGFR >60   >60    "    Eos #   0.17       Eosinophil %   1.0       Globulin, Total   4.6       Glucose 144   104       Group & Rh   B POS       Hematocrit   39.6       Hemoglobin   13.7       Immature Grans (Abs)   0.09       Immature Granulocytes   0.6       Indirect Stephen GEL   NEG       INR   1.05       Lymph #   1.17       LYMPH %   7.2       MCH   29.5       MCHC   34.6       MCV   85.2       Mono #   1.09       Mono %   6.7       MPV   9.2       Neut #   13.8       Neut %   84.1       nRBC   0.0       Platelets   397       Potassium 4.0   4.0       PROTEIN TOTAL   8.8       Protime   13.6       RBC   4.65       RDW   12.7       Sodium 125   123       WBC   16.4              Significant Imaging: I have reviewed and interpreted all pertinent imaging results/findings.     Assessment/Plan:     Active Diagnoses:    Diagnosis Date Noted POA    Displaced fracture of lateral condyle of left femur, initial encounter for open fracture type IIIA, IIIB, or IIIC [S72.422C] 08/24/2022 Unknown    Type III open displaced fracture of medial condyle of left tibia with routine healing [S82.132F] 08/24/2022 Not Applicable      Problems Resolved During this Admission:   H&H stable this morning. Pain is well controlled.   If skin is amenable following evaluation tomorrow, plan for revision knee fusion of the left knee with ex-fix removal.   Remain NWB at this time. No range of motion of the right knee.   Lovenox for DVT prophylaxis during stay.   Continue multimodal pain control as needed with morphine only for breakthrough pain.   Will continue to follow. Plan for discharge over the weekend VS Monday pending pain control following definitive fixation tomorrow.     The above findings, diagnostics, and treatment plan were discussed with Dr. Chatterjee who is in agreement with the plan of care except as stated in additional documentation.       Obdulia Pereyra PA-C  Orthopedic Trauma Surgery  [facility]

## 2022-08-25 NOTE — ANESTHESIA PROCEDURE NOTES
Intubation    Date/Time: 8/24/2022 7:13 PM  Performed by: Kristina Acevedo CRNA  Authorized by: Ronald Romero MD     Intubation:     Induction:  Rapid sequence induction    Intubated:  Postinduction    Mask Ventilation:  Easy with oral airway    Attempts:  1    Attempted By:  CRNA    Method of Intubation:  Direct    Blade:  Dorsey 2    Laryngeal View Grade: Grade IIA - cords partially seen      Difficult Airway Encountered?: No      Complications:  None    Airway Device:  Oral endotracheal tube    Airway Device Size:  7.5    Style/Cuff Inflation:  Cuffed (inflated to minimal occlusive pressure)    Inflation Amount (mL):  6    Tube secured:  21    Secured at:  The teeth    Placement Verified By:  Capnometry    Complicating Factors:  None    Findings Post-Intubation:  BS equal bilateral and atraumatic/condition of teeth unchanged

## 2022-08-26 ENCOUNTER — ANESTHESIA EVENT (OUTPATIENT)
Dept: SURGERY | Facility: HOSPITAL | Age: 62
DRG: 480 | End: 2022-08-26
Payer: MEDICARE

## 2022-08-26 ENCOUNTER — ANESTHESIA (OUTPATIENT)
Dept: SURGERY | Facility: HOSPITAL | Age: 62
DRG: 480 | End: 2022-08-26
Payer: MEDICARE

## 2022-08-26 PROCEDURE — 25000003 PHARM REV CODE 250

## 2022-08-26 PROCEDURE — 63600175 PHARM REV CODE 636 W HCPCS: Performed by: ORTHOPAEDIC SURGERY

## 2022-08-26 PROCEDURE — 25000003 PHARM REV CODE 250: Performed by: INTERNAL MEDICINE

## 2022-08-26 PROCEDURE — 25000003 PHARM REV CODE 250: Performed by: NURSE PRACTITIONER

## 2022-08-26 PROCEDURE — 25000003 PHARM REV CODE 250: Performed by: ORTHOPAEDIC SURGERY

## 2022-08-26 PROCEDURE — 11000001 HC ACUTE MED/SURG PRIVATE ROOM

## 2022-08-26 RX ORDER — HALOPERIDOL 5 MG/1
5 TABLET ORAL
Status: DISCONTINUED | OUTPATIENT
Start: 2022-08-26 | End: 2022-09-28 | Stop reason: HOSPADM

## 2022-08-26 RX ORDER — ONDANSETRON 2 MG/ML
4 INJECTION INTRAMUSCULAR; INTRAVENOUS DAILY PRN
Status: CANCELLED | OUTPATIENT
Start: 2022-08-26

## 2022-08-26 RX ORDER — HYDROCODONE BITARTRATE AND ACETAMINOPHEN 5; 325 MG/1; MG/1
1 TABLET ORAL EVERY 4 HOURS PRN
Status: DISCONTINUED | OUTPATIENT
Start: 2022-08-26 | End: 2022-09-28 | Stop reason: HOSPADM

## 2022-08-26 RX ORDER — PROCHLORPERAZINE EDISYLATE 5 MG/ML
5 INJECTION INTRAMUSCULAR; INTRAVENOUS EVERY 30 MIN PRN
Status: CANCELLED | OUTPATIENT
Start: 2022-08-26

## 2022-08-26 RX ORDER — HYDROMORPHONE HYDROCHLORIDE 2 MG/ML
0.4 INJECTION, SOLUTION INTRAMUSCULAR; INTRAVENOUS; SUBCUTANEOUS EVERY 5 MIN PRN
Status: CANCELLED | OUTPATIENT
Start: 2022-08-26

## 2022-08-26 RX ORDER — MIDAZOLAM HYDROCHLORIDE 1 MG/ML
2 INJECTION INTRAMUSCULAR; INTRAVENOUS ONCE AS NEEDED
Status: CANCELLED | OUTPATIENT
Start: 2022-08-26 | End: 2034-01-22

## 2022-08-26 RX ORDER — SODIUM CHLORIDE, SODIUM LACTATE, POTASSIUM CHLORIDE, CALCIUM CHLORIDE 600; 310; 30; 20 MG/100ML; MG/100ML; MG/100ML; MG/100ML
INJECTION, SOLUTION INTRAVENOUS CONTINUOUS
Status: CANCELLED | OUTPATIENT
Start: 2022-08-26

## 2022-08-26 RX ORDER — SODIUM CHLORIDE, SODIUM GLUCONATE, SODIUM ACETATE, POTASSIUM CHLORIDE AND MAGNESIUM CHLORIDE 30; 37; 368; 526; 502 MG/100ML; MG/100ML; MG/100ML; MG/100ML; MG/100ML
1000 INJECTION, SOLUTION INTRAVENOUS CONTINUOUS
Status: CANCELLED | OUTPATIENT
Start: 2022-08-26 | End: 2022-09-25

## 2022-08-26 RX ADMIN — HYDROCODONE BITARTRATE AND ACETAMINOPHEN 1 TABLET: 5; 325 TABLET ORAL at 08:08

## 2022-08-26 RX ADMIN — HYDROCODONE BITARTRATE AND ACETAMINOPHEN 1 TABLET: 5; 325 TABLET ORAL at 12:08

## 2022-08-26 RX ADMIN — ENOXAPARIN SODIUM 40 MG: 40 INJECTION SUBCUTANEOUS at 04:08

## 2022-08-26 RX ADMIN — OXYCODONE HYDROCHLORIDE AND ACETAMINOPHEN 1 TABLET: 5; 325 TABLET ORAL at 12:08

## 2022-08-26 RX ADMIN — MUPIROCIN: 20 OINTMENT TOPICAL at 08:08

## 2022-08-26 RX ADMIN — HYDROCODONE BITARTRATE AND ACETAMINOPHEN 1 TABLET: 5; 325 TABLET ORAL at 04:08

## 2022-08-26 RX ADMIN — HALOPERIDOL 5 MG: 5 TABLET ORAL at 08:08

## 2022-08-26 RX ADMIN — AMANTADINE HYDROCHLORIDE 100 MG: 100 CAPSULE, LIQUID FILLED ORAL at 12:08

## 2022-08-26 RX ADMIN — AMANTADINE HYDROCHLORIDE 100 MG: 100 CAPSULE, LIQUID FILLED ORAL at 08:08

## 2022-08-26 NOTE — PROGRESS NOTES
[facility] Medicine Progress Note        Chief Complaint: Inpatient Follow-up for consult for med antoine    HPI:   63 yo AAM with hx Schizophrenia and HTN who presents to the ED after a ground level fall landing on his left knee. He is an extremely poor historian. He sustained a displaced open femur fracture grade 3A vs B.  that warranted immediate surgical intervention. Dr. Chatterjee was consulted and pt underwent I&D and external fixation. The Hospitalist Service was consulted for medical management.    Was also found to have hyponatremia that is improving.  Plan for open reduction and internal fixation revision knee fusion today.  Patient ripped his IV lines last night and also refusing lab work      Interval Hx:  Patient seen and examined this morning had a long discussion with him that he will be going for surgery and we really need IV line and blood work he finally agreed discussed with the nursing staff to put the IV line  Objective/physical exam:  General: In no acute distress, afebrile  Chest: Clear to auscultation bilaterally  Heart: RRR, +S1, S2, no appreciable murmur  Abdomen: Soft, nontender, BS +  Neurologic: Alert and oriented   Left leg external fixation in place no obvious drainage    VITAL SIGNS: 24 HRS MIN & MAX LAST   Temp  Min: 97.7 °F (36.5 °C)  Max: 98.6 °F (37 °C) 97.7 °F (36.5 °C)   BP  Min: 102/65  Max: 139/80 139/80   Pulse  Min: 83  Max: 94  94   Resp  Min: 15  Max: 19 18   SpO2  Min: 98 %  Max: 99 % 99 %       Recent Labs   Lab 08/24/22  1827   WBC 16.4*   RBC 4.65*   HGB 13.7*   HCT 39.6*   MCV 85.2   MCH 29.5   MCHC 34.6   RDW 12.7      MPV 9.2       Recent Labs   Lab 08/24/22  1827 08/25/22  0425 08/25/22  1220   * 125* 130*   K 4.0 4.0 3.8   CO2 23 20* 25   BUN 9.4 8.1* 8.6   CREATININE 0.91 0.83 0.95   CALCIUM 9.8 9.3 9.9   ALBUMIN 4.2  --   --    ALKPHOS 71  --   --    ALT 19  --   --    AST 25  --   --    BILITOT 0.6  --   --            Microbiology Results (last 7 days)     ** No results found for the last 168 hours. **           See below for Radiology    Scheduled Med:   enoxaparin  40 mg Subcutaneous Daily    mupirocin   Nasal BID        Continuous Infusions:       PRN Meds:  hydrALAZINE, HYDROcodone-acetaminophen, HYDROmorphone, morphine, ondansetron, ondansetron, sodium chloride 0.9%, sodium chloride 0.9%, sodium chloride 0.9%       Assessment/Plan:   Hypovolemic Hyponatremia (vs side effect of Haldol and Cymbalta)   Schizophrenia, unspecified  Essential HTN  Open Distal Left Femur Fracture s/p I&D and ORIF 8/24     Plan to take him for surgery again today  patient refused labs this morning, finally convinced him to get IV line and blood work discussed with the nursing staff to get the BMP on him  sodium had improved to 130 yesterday IV fluids have been on hold no blood work today because of patient refusal  We have consulted Psychiatry to see if we can switch him to some other medication since Haldol and Cymbalta both cause hyponatremia  Patient had leukocytosis yesterday but I still do not have any blood work will await that but as such patient is afebrile  resume home dose of benztropine    Awaiting blood work for today  We will follow along  Patient condition:  Stable    Anticipated discharge and Disposition: TBD      All diagnosis and differential diagnosis have been reviewed; assessment and plan has been documented; I have personally reviewed the labs and test results that are presently available; I have reviewed the patients medication list; I have reviewed the consulting providers response and recommendations. I have reviewed or attempted to review medical records based upon their availability    All of the patient's questions have been  addressed and answered. Patient's is agreeable to the above stated plan. I will continue to monitor closely and make adjustments to medical management as  needed.  _____________________________________________________________________      Radiology:  CTA Lower Extremity Left  Narrative: EXAMINATION:  CTA LOWER EXTREMITY LEFT    CLINICAL HISTORY:  fracture throu fusion;    TECHNIQUE:  Helically acquired imaging through the left leg were obtained prior to and after the IV administration of contrast. Axial, sagittal and coronal reformations were created and interpreted.  CTA images with 3D MIP reformats.    Automated tube current modulation, weight-based exposure dosing, and/or iterative reconstruction technique utilized to reach lowest reasonably achievable exposure rate.    DLP: 796 mGy*cm    COMPARISON:  Plain radiographs 08/24/2022    FINDINGS:  BONES/JOINTS: There is an external fixator device in place with proximal fixation hardware at the femur and distal fixation hardware at the tibia.  This stabilizes the fracture through the previously ankylosed knee.  There was bony fusion at the knee without retained hardware.  Fracture plane extends through the level of the distal femoral metaphysis.  There is absence of bony mineralization the level of the distal femur in the expected region of the intercondylar notch of the former knee joint space which may be postsurgical, anatomic related to the intercondylar notch or related to infection in the appropriate clinical setting.  There are foci of soft tissue gas extending from the anterior soft tissue to abut the bone and into this osseous defect.  There are chronic deformities of the proximal femur, knee and proximal fibula and calcaneus.  There is sclerosis at the calcaneus with corticated tracks, presumably related to removed hardware or prior fracture.  There are degenerative  changes at the hip, ankle and hindfoot.    VASCULATURE: Left common and external iliac arteries are patent.  No significant atherosclerosis.  Mild atherosclerosis at the internal iliac artery.  Left common and superficial femoral arteries are  patent.  Popliteal artery is patent.  Tibioperoneal trunk is patent.  There is 2 vessel runoff to the ankle, anterior tibialis and peroneal arteries.  The posterior tibial artery only seen proximally.  At the level of the external fixator device the distal superficial femoral artery is obscured by beam hardening artifact.  The tip of the lower femoral fixation screw extends near the vessel without overt evidence of vascular injury.    SOFT TISSUES: Partially imaged bladder is distended and trabeculated with multiple diverticula.    OTHER: N/A  Impression: 1. Acute, nondisplaced fracture at the distal femoral metaphysis stabilized by an external fixator device.  This is at the level of the chronically ankylosed knee joint.  Fracture appears open with soft tissue gas extending from the anterior soft tissue defect in to the fracture.  2. No evidence of acute vascular injury.  Some portions of the vasculature are obscured related to beam hardening artifact from external fixator device.  The tip of the lower femoral fixation screw extends to just adjacent to the distal superficial femoral artery without appreciable vascular injury or contact.  3. Two vessel runoff to the ankle.  Posterior tibial artery is diminutive and appears to be occluded proximally.  This is not definitively acute.  4. Trabeculated, distended bladder.  Minor discrepancy from preliminary report    Electronically signed by: Radha Valdivia  Date:    08/25/2022  Time:    10:13      Alivia Darling MD   08/26/2022

## 2022-08-26 NOTE — NURSING
"0015-went to check on pt and he stated that he was in pain. Went to get his PRN med (oxycodone) to give to the pt, he initially refused it because he said he would "rather take hydrocodone". I told him I could ask the dr about switching it. Before I was able to notify the attending, he called back and said he would take the oxycodone while we were figuring out the hydrocodone. Gave pt oxycodone.   0045-notified Shonda Burgos NP and she gave the ok to switch Oxycodone to Hydrocodone. Order placed.  0445-pt complaining of pain, gave him Hydrocodone. Noticed he had removed both peripheral IVs, both on floor. Told pt I would have to place a new IV and he said "no, no more sticking me". I explained to him the importance of having an IV and the need to have one to prepare for surgery. He continued to refuse. Lab also notified me that pt refused morning blood work. Notified Shonda Burgos NP.  "

## 2022-08-26 NOTE — PLAN OF CARE
Dr Darling wants psych consult as pt has home meds ( haldol etc) that she cannot give here due to other health issues. They psych consult is for medication management. Regina at [facility] intake contacted and the above discussed. Regina confirms this will occur today and understands dr wants this done today.

## 2022-08-26 NOTE — PT/OT/SLP PROGRESS
Occupational Therapy      Patient Name:  Brandyn Ty   MRN:  [id number]    Patient not seen today secondary to going to OR for ORIF and revision of knee fusion. Will follow-up as appropriate.    8/26/2022

## 2022-08-26 NOTE — ANESTHESIA PREPROCEDURE EVALUATION
08/28/2022  Brandyn Ty is a 62 y.o., male.    Pre-op Assessment    I have reviewed the Patient Summary Reports.     I have reviewed the Nursing Notes. I have reviewed the NPO Status.   I have reviewed the Medications.     Review of Systems  Anesthesia Hx:  No problems with previous Anesthesia    Hematology/Oncology:  Hematology Normal   Oncology Normal     Cardiovascular:   Hypertension    Pulmonary:  Pulmonary Normal    Renal/:   Hyponatremia improving -- now 130    Hepatic/GI:   Liver Disease, Hepatitis    Musculoskeletal:  Musculoskeletal Normal    Neurological:  Neurology Normal    Endocrine:  Endocrine Normal    Dermatological:  Skin Normal    Psych:   Psychiatric History schizo         Physical Exam  General: Well nourished, Cooperative, Alert and Oriented    Airway:  Mallampati: II   Mouth Opening: Normal  TM Distance: Normal  Tongue: Normal  Neck ROM: Normal ROM    Dental:missing      Anesthesia Plan  Type of Anesthesia, risks & benefits discussed:    Anesthesia Type: Gen ETT  Intra-op Monitoring Plan: Standard ASA Monitors  Post Op Pain Control Plan: IV/PO Opioids PRN and multimodal analgesia  Induction:  IV  Airway Plan: Direct  Informed Consent: Informed consent signed with the Patient and all parties understand the risks and agree with anesthesia plan.  All questions answered. Patient consented to blood products? No  ASA Score: 3  Day of Surgery Review of History & Physical: H&P Update referred to the surgeon/provider.  Anesthesia Plan Notes:       Ready For Surgery From Anesthesia Perspective.     .

## 2022-08-26 NOTE — PT/OT/SLP PROGRESS
Physical Therapy      Patient Name:  Brandyn Ty   MRN:  [id number]    Patient not seen today secondary to Off the floor for procedure/surgery, Other (Comment). Pt laying in bed sleeping, stating he would like to rest before his sx, nurse unaware what time it is planned. Will follow-up 08/27, unless pt is appropriate later on today

## 2022-08-27 LAB
ANION GAP SERPL CALC-SCNC: 7 MEQ/L
BASOPHILS # BLD AUTO: 0.11 X10(3)/MCL (ref 0–0.2)
BASOPHILS NFR BLD AUTO: 1 %
BUN SERPL-MCNC: 13.2 MG/DL (ref 8.4–25.7)
CALCIUM SERPL-MCNC: 9.3 MG/DL (ref 8.8–10)
CHLORIDE SERPL-SCNC: 100 MMOL/L (ref 98–107)
CO2 SERPL-SCNC: 23 MMOL/L (ref 23–31)
CREAT SERPL-MCNC: 0.79 MG/DL (ref 0.73–1.18)
CREAT/UREA NIT SERPL: 17
EOSINOPHIL # BLD AUTO: 0.42 X10(3)/MCL (ref 0–0.9)
EOSINOPHIL NFR BLD AUTO: 3.8 %
ERYTHROCYTE [DISTWIDTH] IN BLOOD BY AUTOMATED COUNT: 13.8 % (ref 11.5–17)
GFR SERPLBLD CREATININE-BSD FMLA CKD-EPI: >60 MLS/MIN/1.73/M2
GLUCOSE SERPL-MCNC: 80 MG/DL (ref 82–115)
HCT VFR BLD AUTO: 31.5 % (ref 42–52)
HGB BLD-MCNC: 10 GM/DL (ref 14–18)
IMM GRANULOCYTES # BLD AUTO: 0.05 X10(3)/MCL (ref 0–0.04)
IMM GRANULOCYTES NFR BLD AUTO: 0.5 %
LYMPHOCYTES # BLD AUTO: 2.03 X10(3)/MCL (ref 0.6–4.6)
LYMPHOCYTES NFR BLD AUTO: 18.3 %
MCH RBC QN AUTO: 29.9 PG (ref 27–31)
MCHC RBC AUTO-ENTMCNC: 31.7 MG/DL (ref 33–36)
MCV RBC AUTO: 94 FL (ref 80–94)
MONOCYTES # BLD AUTO: 1.26 X10(3)/MCL (ref 0.1–1.3)
MONOCYTES NFR BLD AUTO: 11.3 %
NEUTROPHILS # BLD AUTO: 7.2 X10(3)/MCL (ref 2.1–9.2)
NEUTROPHILS NFR BLD AUTO: 65.1 %
NRBC BLD AUTO-RTO: 0 %
PLATELET # BLD AUTO: 326 X10(3)/MCL (ref 130–400)
PMV BLD AUTO: 9.5 FL (ref 7.4–10.4)
POTASSIUM SERPL-SCNC: 4.3 MMOL/L (ref 3.5–5.1)
RBC # BLD AUTO: 3.35 X10(6)/MCL (ref 4.7–6.1)
SODIUM SERPL-SCNC: 130 MMOL/L (ref 136–145)
WBC # SPEC AUTO: 11.1 X10(3)/MCL (ref 4.5–11.5)

## 2022-08-27 PROCEDURE — 25000003 PHARM REV CODE 250: Performed by: INTERNAL MEDICINE

## 2022-08-27 PROCEDURE — 85025 COMPLETE CBC W/AUTO DIFF WBC: CPT | Performed by: HOSPITALIST

## 2022-08-27 PROCEDURE — 36415 COLL VENOUS BLD VENIPUNCTURE: CPT | Performed by: HOSPITALIST

## 2022-08-27 PROCEDURE — 25000003 PHARM REV CODE 250: Performed by: NURSE PRACTITIONER

## 2022-08-27 PROCEDURE — 80048 BASIC METABOLIC PNL TOTAL CA: CPT | Performed by: HOSPITALIST

## 2022-08-27 PROCEDURE — 63600175 PHARM REV CODE 636 W HCPCS: Performed by: ORTHOPAEDIC SURGERY

## 2022-08-27 PROCEDURE — 11000001 HC ACUTE MED/SURG PRIVATE ROOM

## 2022-08-27 PROCEDURE — 25000003 PHARM REV CODE 250: Performed by: ORTHOPAEDIC SURGERY

## 2022-08-27 RX ORDER — SODIUM CHLORIDE 9 MG/ML
INJECTION, SOLUTION INTRAVENOUS CONTINUOUS
Status: DISCONTINUED | OUTPATIENT
Start: 2022-08-27 | End: 2022-09-03

## 2022-08-27 RX ADMIN — AMANTADINE HYDROCHLORIDE 100 MG: 100 CAPSULE, LIQUID FILLED ORAL at 09:08

## 2022-08-27 RX ADMIN — ENOXAPARIN SODIUM 40 MG: 40 INJECTION SUBCUTANEOUS at 08:08

## 2022-08-27 RX ADMIN — MUPIROCIN: 20 OINTMENT TOPICAL at 09:08

## 2022-08-27 RX ADMIN — HYDROCODONE BITARTRATE AND ACETAMINOPHEN 1 TABLET: 5; 325 TABLET ORAL at 06:08

## 2022-08-27 RX ADMIN — HYDROCODONE BITARTRATE AND ACETAMINOPHEN 1 TABLET: 5; 325 TABLET ORAL at 12:08

## 2022-08-27 RX ADMIN — MUPIROCIN: 20 OINTMENT TOPICAL at 08:08

## 2022-08-27 RX ADMIN — HYDROCODONE BITARTRATE AND ACETAMINOPHEN 1 TABLET: 5; 325 TABLET ORAL at 05:08

## 2022-08-27 RX ADMIN — HYDROCODONE BITARTRATE AND ACETAMINOPHEN 1 TABLET: 5; 325 TABLET ORAL at 10:08

## 2022-08-27 RX ADMIN — HYDROCODONE BITARTRATE AND ACETAMINOPHEN 1 TABLET: 5; 325 TABLET ORAL at 09:08

## 2022-08-27 RX ADMIN — HYDROCODONE BITARTRATE AND ACETAMINOPHEN 1 TABLET: 5; 325 TABLET ORAL at 01:08

## 2022-08-27 RX ADMIN — AMANTADINE HYDROCHLORIDE 100 MG: 100 CAPSULE, LIQUID FILLED ORAL at 08:08

## 2022-08-27 RX ADMIN — SODIUM CHLORIDE: 9 INJECTION, SOLUTION INTRAVENOUS at 10:08

## 2022-08-27 NOTE — PROGRESS NOTES
[facility] Medicine Progress Note        Chief Complaint: Inpatient Follow-up for consult for med mgmt    HPI:   63 yo AAM with hx Schizophrenia and HTN who presents to the ED after a ground level fall landing on his left knee. He is an extremely poor historian. He sustained a displaced open femur fracture grade 3A vs B.  that warranted immediate surgical intervention. Dr. Chatterjee was consulted and pt underwent I&D and external fixation. The Hospitalist Service was consulted for medical management.    Was also found to have hyponatremia that is improving.  Plan for open reduction and internal fixation revision knee fusion today.  Patient ripped his IV lines last night and also refusing lab work      Interval Hx:  Patient seen and examined this morning he just wants his haloperidol to be resumed.  Discussed with him that for now we are holding off and waiting for psych to come by and make some recommendations and we are holding it because of his low sodium      Objective/physical exam:  General: In no acute distress, afebrile  Chest: Clear to auscultation bilaterally  Heart: RRR, +S1, S2, no appreciable murmur  Abdomen: Soft, nontender, BS +  Neurologic: Alert and oriented   Left leg external fixation in place no obvious drainage    VITAL SIGNS: 24 HRS MIN & MAX LAST   Temp  Min: 97.6 °F (36.4 °C)  Max: 98.7 °F (37.1 °C) 97.8 °F (36.6 °C)   BP  Min: 120/74  Max: 137/79 120/74   Pulse  Min: 72  Max: 107  72   Resp  Min: 17  Max: 20 18   SpO2  Min: 98 %  Max: 99 % 98 %       Recent Labs   Lab 08/24/22 1827 08/27/22  0514   WBC 16.4* 11.1   RBC 4.65* 3.35*   HGB 13.7* 10.0*   HCT 39.6* 31.5*   MCV 85.2 94.0   MCH 29.5 29.9   MCHC 34.6 31.7*   RDW 12.7 13.8    326   MPV 9.2 9.5       Recent Labs   Lab 08/24/22 1827 08/25/22  0425 08/25/22  1220 08/27/22  0514   * 125* 130* 130*   K 4.0 4.0 3.8 4.3   CO2 23 20* 25 23   BUN 9.4 8.1* 8.6 13.2   CREATININE 0.91 0.83 0.95  0.79   CALCIUM 9.8 9.3 9.9 9.3   ALBUMIN 4.2  --   --   --    ALKPHOS 71  --   --   --    ALT 19  --   --   --    AST 25  --   --   --    BILITOT 0.6  --   --   --           Microbiology Results (last 7 days)       ** No results found for the last 168 hours. **             See below for Radiology    Scheduled Med:   amantadine HCL  100 mg Oral BID    enoxaparin  40 mg Subcutaneous Daily    haloperidoL  5 mg Oral Daily before evening meal    mupirocin   Nasal BID        Continuous Infusions:       PRN Meds:  hydrALAZINE, HYDROcodone-acetaminophen, HYDROmorphone, morphine, ondansetron, ondansetron, sodium chloride 0.9%, sodium chloride 0.9%, sodium chloride 0.9%       Assessment/Plan:   Hypovolemic Hyponatremia (vs side effect of Haldol and Cymbalta)   Schizophrenia, unspecified  Essential HTN  Open Distal Left Femur Fracture s/p I&D and ORIF 8/24     Sodium is 130 today   I will continue with normal saline at 50 cc an hour  We had consulted psych yesterday to see if there is any alternative for Haldol and duloxetine since we cannot give it because of hyponatremia and they have not mention anything in their note.  Discussed with the nursing staff to call them again and see what are their recommendations regarding that   Patient is status post surgery done I am still awaiting the official report   White cell count is back to normal  Will repeat blood work in a.m.  We will follow along  Patient condition:  Stable    Anticipated discharge and Disposition: TBD      All diagnosis and differential diagnosis have been reviewed; assessment and plan has been documented; I have personally reviewed the labs and test results that are presently available; I have reviewed the patients medication list; I have reviewed the consulting providers response and recommendations. I have reviewed or attempted to review medical records based upon their availability    All of the patient's questions have been  addressed and answered. Patient's  is agreeable to the above stated plan. I will continue to monitor closely and make adjustments to medical management as needed.  _____________________________________________________________________      Radiology:  CTA Lower Extremity Left  Narrative: EXAMINATION:  CTA LOWER EXTREMITY LEFT    CLINICAL HISTORY:  fracture throu fusion;    TECHNIQUE:  Helically acquired imaging through the left leg were obtained prior to and after the IV administration of contrast. Axial, sagittal and coronal reformations were created and interpreted.  CTA images with 3D MIP reformats.    Automated tube current modulation, weight-based exposure dosing, and/or iterative reconstruction technique utilized to reach lowest reasonably achievable exposure rate.    DLP: 796 mGy*cm    COMPARISON:  Plain radiographs 08/24/2022    FINDINGS:  BONES/JOINTS: There is an external fixator device in place with proximal fixation hardware at the femur and distal fixation hardware at the tibia.  This stabilizes the fracture through the previously ankylosed knee.  There was bony fusion at the knee without retained hardware.  Fracture plane extends through the level of the distal femoral metaphysis.  There is absence of bony mineralization the level of the distal femur in the expected region of the intercondylar notch of the former knee joint space which may be postsurgical, anatomic related to the intercondylar notch or related to infection in the appropriate clinical setting.  There are foci of soft tissue gas extending from the anterior soft tissue to abut the bone and into this osseous defect.  There are chronic deformities of the proximal femur, knee and proximal fibula and calcaneus.  There is sclerosis at the calcaneus with corticated tracks, presumably related to removed hardware or prior fracture.  There are degenerative  changes at the hip, ankle and hindfoot.    VASCULATURE: Left common and external iliac arteries are patent.  No significant  atherosclerosis.  Mild atherosclerosis at the internal iliac artery.  Left common and superficial femoral arteries are patent.  Popliteal artery is patent.  Tibioperoneal trunk is patent.  There is 2 vessel runoff to the ankle, anterior tibialis and peroneal arteries.  The posterior tibial artery only seen proximally.  At the level of the external fixator device the distal superficial femoral artery is obscured by beam hardening artifact.  The tip of the lower femoral fixation screw extends near the vessel without overt evidence of vascular injury.    SOFT TISSUES: Partially imaged bladder is distended and trabeculated with multiple diverticula.    OTHER: N/A  Impression: 1. Acute, nondisplaced fracture at the distal femoral metaphysis stabilized by an external fixator device.  This is at the level of the chronically ankylosed knee joint.  Fracture appears open with soft tissue gas extending from the anterior soft tissue defect in to the fracture.  2. No evidence of acute vascular injury.  Some portions of the vasculature are obscured related to beam hardening artifact from external fixator device.  The tip of the lower femoral fixation screw extends to just adjacent to the distal superficial femoral artery without appreciable vascular injury or contact.  3. Two vessel runoff to the ankle.  Posterior tibial artery is diminutive and appears to be occluded proximally.  This is not definitively acute.  4. Trabeculated, distended bladder.  Minor discrepancy from preliminary report    Electronically signed by: Radha Valdivia  Date:    08/25/2022  Time:    10:13      Alivia Darling MD   08/27/2022

## 2022-08-27 NOTE — PROGRESS NOTES
"Status post open distal femur fracture  No acute events overnight.    Pain controlled.    Resting in bed.       Vital Signs  Temp: 97.6 °F (36.4 °C)  Temp src: Oral  Pulse: 80  Heart Rate Source: Monitor  Resp: 18  SpO2: 98 %  O2 Device (Oxygen Therapy): room air  BP: 132/80  BP Location: Left arm  BP Method: Automatic  Patient Position: Lying  Height and Weight  Height: 5' 10" (177.8 cm)  Weight: 83.9 kg (185 lb)  Weight Method: Bed Scale  BSA (Calculated - sq m): 2.04 sq meters  BMI (Calculated): 26.5  Weight in (lb) to have BMI = 25: 173.9]    Left Lower extremity compartment soft and warm  No s/s of DVT or infection, dressing was slight drainage  External fixator stable  NVI distally    Recent Lab Results         08/27/22  0514        Anion Gap 7.0       Baso # 0.11       Basophil % 1.0       BUN 13.2       BUN/CREAT RATIO 17       Calcium 9.3       Chloride 100       CO2 23       Creatinine 0.79       eGFR >60       Eos # 0.42       Eosinophil % 3.8       Glucose 80       Hematocrit 31.5       Hemoglobin 10.0       Immature Grans (Abs) 0.05       Immature Granulocytes 0.5       Lymph # 2.03       LYMPH % 18.3       MCH 29.9       MCHC 31.7       MCV 94.0       Mono # 1.26       Mono % 11.3       MPV 9.5       Neut # 7.2       Neut % 65.1       nRBC 0.0       Platelets 326       Potassium 4.3       RBC 3.35       RDW 13.8       Sodium 130       WBC 11.1               A/P:    Possible OR tomorrow for a left revision knee fusion    "

## 2022-08-27 NOTE — CONSULTS
"[facility]  Psychiatry  History & Physical    Patient Name: Brandyn Ty  MRN: [id number]   Code Status: No Order  Admission Date: 8/24/2022  Attending Physician: Shawn Chatterjee DO   Primary Care Provider: Elizabeth Marin MD    Current Legal Status: Uncontested    Patient information was obtained from patient and ER records.     Subjective:     Principal Problem:<principal problem not specified>    Chief Complaint:  "I'm ok. Just need my medicine"     HPI: Patient here post fall and ORIF. Patient endorses a long history of schizophrenia, including several hospital stays. He states that he has bee successfully treated with Haldol for several years. Patient states that he has not had his medicine in 3 -4 days. He endorses a history of auditory hallucinations but denies any current AVH. States that he last heard voices yesterday. He denies that they are telling him to harm himself or others. He states that they have been very stable with his medication. Patient states that he is currently experiencing some mild depression and anxiety but nothing that is overwhelming.          Patient History           Medical as of 8/26/2022     Past Medical History     Diagnosis Date Comments Source    Anxiety -- -- Provider    Hypertension -- -- Provider                  Surgical as of 8/26/2022     Past Surgical History     Procedure Laterality Date Comments Source    APPLICATION OF LARGE EXTERNAL FIXATION DEVICE TO TIBIA Left 8/24/2022 Procedure: APPLICATION, EXTERNAL FIXATION DEVICE, LARGE, TIBIA;  Surgeon: Shawn Chatterjee DO;  Location: [facility];  Service: Orthopedics;  Laterality: Left; Provider                  Family as of 8/26/2022    Family history is unknown by patient.           Tobacco Use as of 8/26/2022     Smoking Status Smoking Start Date Smoking Quit Date Packs/Day Years Used    Never Smoker -- -- -- --    Types Comments Smokeless Tobacco Status Smokeless Tobacco Quit Date Source    " -- -- Never Used -- Provider            Alcohol Use as of 8/26/2022     Alcohol Use Drinks/Week Alcohol/Week Comments Source    Not Currently   -- -- Provider            Drug Use as of 8/26/2022     Drug Use Types Frequency Comments Source    -- -- -- -- Provider            Sexual Activity as of 8/26/2022     Sexually Active Birth Control Partners Comments Source    -- -- -- -- Provider            Activities of Daily Living as of 8/26/2022    None           Social Documentation as of 8/26/2022    None           Occupational as of 8/26/2022     Occupation Employer Comments Source    disabled -- -- Provider            Socioeconomic as of 8/26/2022     Marital Status Spouse Name Number of Children Years Education Education Level Preferred Language Ethnicity Race Source    Single -- -- -- -- English Not  or /a Black or  Provider            Pertinent History     Question Response Comments    Lives with -- --    Place in Birth Order -- --    Lives in -- --    Number of Siblings -- --    Raised by -- --    Legal Involvement -- --    Childhood Trauma -- --    Criminal History of -- --    Financial Status -- --    Highest Level of Education -- --    Does patient have access to a firearm? -- --     Service -- --    Primary Leisure Activity -- --    Spirituality -- --        Past Medical History:   Diagnosis Date    Anxiety     Hypertension      Past Surgical History:   Procedure Laterality Date    APPLICATION OF LARGE EXTERNAL FIXATION DEVICE TO TIBIA Left 8/24/2022    Procedure: APPLICATION, EXTERNAL FIXATION DEVICE, LARGE, TIBIA;  Surgeon: Shawn Chatterjee DO;  Location: [facility];  Service: Orthopedics;  Laterality: Left;     Family History     Family history is unknown by patient.        Tobacco Use    Smoking status: Never Smoker    Smokeless tobacco: Never Used   Substance and Sexual Activity    Alcohol use: Not Currently    Drug use: Not on file    Sexual activity: Not on file      Review of patient's allergies indicates:   Allergen Reactions    Chlorpromazine      Other reaction(s): Chlorpromazine, Unknown - See comments    Fluphenazine      Other reaction(s): Fluphenazine, Unknown - See comments    Trazodone      Other reaction(s): Trazodone, Unknown - See comments       No current facility-administered medications on file prior to encounter.     Current Outpatient Medications on File Prior to Encounter   Medication Sig    acetaminophen (TYLENOL) 500 MG tablet Take 500 mg by mouth 2 (two) times daily.    amantadine HCl (SYMMETREL) 100 mg capsule amantadine HCl 100 mg capsule   PO BiD    amoxicillin-clavulanate 875-125mg (AUGMENTIN) 875-125 mg per tablet Take 1 tablet by mouth every 12 (twelve) hours. (Patient not taking: Reported on 2/6/2020)    benztropine (COGENTIN) 1 MG tablet benztropine 1 mg tablet    dicyclomine (BENTYL) 20 mg tablet TAKE 1 TABLET BY MOUTH 4 TIMES DAILY    DULoxetine (CYMBALTA) 30 MG capsule Take 30 mg by mouth once daily.    fluticasone propionate (FLONASE) 50 mcg/actuation nasal spray 1 spray by Each Nostril route 2 (two) times daily.    haloperidol (HALDOL) 5 MG tablet     naproxen (NAPROSYN) 500 MG tablet Take 500 mg by mouth 2 (two) times daily as needed.    simvastatin (ZOCOR) 20 MG tablet Take 1 tablet (20 mg total) by mouth every evening.     Psychotherapeutics (From admission, onward)            Start     Stop Route Frequency Ordered    08/26/22 1800  haloperidoL tablet 5 mg        Question:  Is the patient competent?  Answer:  Yes    -- Oral Daily before evening meal 08/26/22 7135        Review of Systems  Strengths and Liabilities: Strength: Patient accepts guidance/feedback, Strength: Patient has reasonable judgment., Liability: Patient has no suport network.    Objective:     Vital Signs (Most Recent):  Temp: 97.9 °F (36.6 °C) (08/26/22 1946)  Pulse: 107 (08/26/22 1946)  Resp: 18 (08/26/22 2036)  BP: 137/79 (08/26/22 1946)  SpO2: 99 %  "(08/26/22 1946) Vital Signs (24h Range):  Temp:  [97.7 °F (36.5 °C)-98.5 °F (36.9 °C)] 97.9 °F (36.6 °C)  Pulse:  [] 107  Resp:  [16-20] 18  SpO2:  [98 %-99 %] 99 %  BP: (122-139)/(71-83) 137/79     Height: 5' 10" (177.8 cm)  Weight: 83.9 kg (185 lb)  Body mass index is 26.54 kg/m².      Intake/Output Summary (Last 24 hours) at 8/26/2022 2100  Last data filed at 8/26/2022 1500  Gross per 24 hour   Intake --   Output 610 ml   Net -610 ml       Physical Exam        Significant Labs:   Last 24 Hours:   Recent Lab Results       08/25/22 2208        ID NOW COVID-19, (GAIL) Negative             Significant Imaging: None    Assessment/Plan:     Active Diagnoses:    Diagnosis Date Noted POA    Displaced fracture of lateral condyle of left femur, initial encounter for open fracture type IIIA, IIIB, or IIIC [S72.422C] 08/24/2022 Unknown    Type III open displaced fracture of medial condyle of left tibia with routine healing [S82.132F] 08/24/2022 Not Applicable      Problems Resolved During this Admission:      Estimated Discharge Date:   Initial Discharge Plan: Home     Prognosis: Guarded    Need for Continued Hospitalization:   No need for inpatient psychiatric hospitalization. Continue medical care as per the primary team.    Skip Doll, [id number]   Psychiatry  [facility]  "

## 2022-08-27 NOTE — CONSULTS
"[facility] - [facility] Neuro  Psychiatry  History & Physical    Patient Name: Brandyn Ty  MRN: [id number]   Code Status: No Order  Admission Date: 8/24/2022  Attending Physician: Shawn Chatterjee DO   Primary Care Provider: Elizabeth Marin MD    Current Legal Status: Uncontested    Patient information was obtained from patient and ER records.     Subjective:     Principal Problem:<principal problem not specified>    Chief Complaint:  "I'm ok. Just need my medicine"     HPI: Patient here post fall and ORIF. Patient endorses a long history of schizophrenia, including several hospital stays. He states that he has bee successfully treated with Haldol for several years. Patient states that he has not had his medicine in 3 -4 days. He endorses a history of auditory hallucinations but denies any current AVH. States that he last heard voices yesterday. He denies that they are telling him to harm himself or others. He states that they have been very stable with his medication. Patient states that he is currently experiencing some mild depression and anxiety but nothing that is overwhelming.     8/27/22 Psych FU  Patient sitting up in bed with eyes closed and full food tray present at time of assessment. Patient continues to want to restart Haldol, but MD has requested this be held due to low sodium. Given this as a side effect of other antipsychotics it is recommended that this class of medication be held until Na is corrected. Patient denies any current AVH. He denies any SI/HI.          Patient History               Medical as of 8/27/2022       Past Medical History       Diagnosis Date Comments Source    Anxiety -- -- Provider    Hypertension -- -- Provider                          Surgical as of 8/27/2022       Past Surgical History       Procedure Laterality Date Comments Source    APPLICATION OF LARGE EXTERNAL FIXATION DEVICE TO TIBIA Left 8/24/2022 Procedure: APPLICATION, EXTERNAL FIXATION DEVICE, " LARGE, TIBIA;  Surgeon: Shawn Chatterjee DO;  Location: [facility];  Service: Orthopedics;  Laterality: Left; Provider                          Family as of 8/27/2022    Family history is unknown by patient.               Tobacco Use as of 8/27/2022       Smoking Status Smoking Start Date Quit Date Smoking Frequency    Never -- --       Smokeless Status Smokeless Type Smokeless Quit Date    Never -- --      Source    Provider                  Alcohol Use as of 8/27/2022       Alcohol Use Drinks/Week Alcohol/Week Comments Source    Not Currently   -- -- Provider                  Drug Use as of 8/27/2022       Drug Use Types Frequency Comments Source    -- -- -- -- Provider                  Sexual Activity as of 8/27/2022       Sexually Active Birth Control Partners Comments Source    -- -- -- -- Provider                  Activities of Daily Living as of 8/27/2022    None               Social Documentation as of 8/27/2022    None               Occupational as of 8/27/2022       Occupation Employer Comments Source    disabled -- -- Provider                  Socioeconomic as of 8/27/2022       Marital Status Spouse Name Number of Children Years Education Education Level Preferred Language Ethnicity Race Source    Single -- -- -- -- English Not  or /a Black or  Provider                  Pertinent History       Question Response Comments    Lives with -- --    Place in Birth Order -- --    Lives in -- --    Number of Siblings -- --    Raised by -- --    Legal Involvement -- --    Childhood Trauma -- --    Criminal History of -- --    Financial Status -- --    Highest Level of Education -- --    Does patient have access to a firearm? -- --     Service -- --    Primary Leisure Activity -- --    Spirituality -- --          Past Medical History:   Diagnosis Date    Anxiety     Hypertension      Past Surgical History:   Procedure Laterality Date    APPLICATION OF LARGE EXTERNAL FIXATION DEVICE  TO TIBIA Left 8/24/2022    Procedure: APPLICATION, EXTERNAL FIXATION DEVICE, LARGE, TIBIA;  Surgeon: Shawn Chatterjee DO;  Location: [facility];  Service: Orthopedics;  Laterality: Left;     Family History       Family history is unknown by patient.          Tobacco Use    Smoking status: Never    Smokeless tobacco: Never   Substance and Sexual Activity    Alcohol use: Not Currently    Drug use: Not on file    Sexual activity: Not on file     Review of patient's allergies indicates:   Allergen Reactions    Chlorpromazine      Other reaction(s): Chlorpromazine, Unknown - See comments    Fluphenazine      Other reaction(s): Fluphenazine, Unknown - See comments    Trazodone      Other reaction(s): Trazodone, Unknown - See comments       No current facility-administered medications on file prior to encounter.     Current Outpatient Medications on File Prior to Encounter   Medication Sig    acetaminophen (TYLENOL) 500 MG tablet Take 500 mg by mouth 2 (two) times daily.    amantadine HCl (SYMMETREL) 100 mg capsule amantadine HCl 100 mg capsule   PO BiD    amoxicillin-clavulanate 875-125mg (AUGMENTIN) 875-125 mg per tablet Take 1 tablet by mouth every 12 (twelve) hours. (Patient not taking: Reported on 2/6/2020)    benztropine (COGENTIN) 1 MG tablet benztropine 1 mg tablet    dicyclomine (BENTYL) 20 mg tablet TAKE 1 TABLET BY MOUTH 4 TIMES DAILY    DULoxetine (CYMBALTA) 30 MG capsule Take 30 mg by mouth once daily.    fluticasone propionate (FLONASE) 50 mcg/actuation nasal spray 1 spray by Each Nostril route 2 (two) times daily.    haloperidol (HALDOL) 5 MG tablet     naproxen (NAPROSYN) 500 MG tablet Take 500 mg by mouth 2 (two) times daily as needed.    simvastatin (ZOCOR) 20 MG tablet Take 1 tablet (20 mg total) by mouth every evening.     Psychotherapeutics (From admission, onward)      Start     Stop Route Frequency Ordered    08/26/22 1800  haloperidoL tablet 5 mg        Question:  Is the patient competent?  Answer:  Yes  "   -- Oral Daily before evening meal 08/26/22 3039          Review of Systems  Strengths and Liabilities: Strength: Patient accepts guidance/feedback, Strength: Patient has reasonable judgment., Liability: Patient has no suport network.    Objective:     Vital Signs (Most Recent):  Temp: 97.6 °F (36.4 °C) (08/27/22 1513)  Pulse: 89 (08/27/22 1513)  Resp: 18 (08/27/22 1513)  BP: 134/84 (08/27/22 1513)  SpO2: 98 % (08/27/22 1513) Vital Signs (24h Range):  Temp:  [97.6 °F (36.4 °C)-98.7 °F (37.1 °C)] 97.6 °F (36.4 °C)  Pulse:  [] 89  Resp:  [17-20] 18  SpO2:  [98 %-99 %] 98 %  BP: (120-137)/(74-86) 134/84     Height: 5' 10" (177.8 cm)  Weight: 83.9 kg (185 lb)  Body mass index is 26.54 kg/m².      Intake/Output Summary (Last 24 hours) at 8/27/2022 1539  Last data filed at 8/27/2022 0600  Gross per 24 hour   Intake --   Output 525 ml   Net -525 ml         Physical Exam        Significant Labs:   Last 24 Hours:   Recent Lab Results         08/27/22  0514        Anion Gap 7.0       Baso # 0.11       Basophil % 1.0       BUN 13.2       BUN/CREAT RATIO 17       Calcium 9.3       Chloride 100       CO2 23       Creatinine 0.79       eGFR >60       Eos # 0.42       Eosinophil % 3.8       Glucose 80       Hematocrit 31.5       Hemoglobin 10.0       Immature Grans (Abs) 0.05       Immature Granulocytes 0.5       Lymph # 2.03       LYMPH % 18.3       MCH 29.9       MCHC 31.7       MCV 94.0       Mono # 1.26       Mono % 11.3       MPV 9.5       Neut # 7.2       Neut % 65.1       nRBC 0.0       Platelets 326       Potassium 4.3       RBC 3.35       RDW 13.8       Sodium 130       WBC 11.1               Significant Imaging: None    Assessment/Plan:     Active Diagnoses:    Diagnosis Date Noted POA    Displaced fracture of lateral condyle of left femur, initial encounter for open fracture type IIIA, IIIB, or IIIC [S72.422C] 08/24/2022 Unknown    Type III open displaced fracture of medial condyle of left tibia with routine " healing [S82.132F] 08/24/2022 Not Applicable      Problems Resolved During this Admission:      Estimated Discharge Date:   Initial Discharge Plan: Home     Prognosis: Guarded    Need for Continued Hospitalization:   No need for inpatient psychiatric hospitalization. Continue medical care as per the primary team.    Skip Doll, WILLIAMP   Psychiatry  [facility]

## 2022-08-28 LAB
ANION GAP SERPL CALC-SCNC: 9 MEQ/L
BUN SERPL-MCNC: 10.7 MG/DL (ref 8.4–25.7)
CALCIUM SERPL-MCNC: 9.3 MG/DL (ref 8.8–10)
CHLORIDE SERPL-SCNC: 101 MMOL/L (ref 98–107)
CO2 SERPL-SCNC: 24 MMOL/L (ref 23–31)
CREAT SERPL-MCNC: 0.78 MG/DL (ref 0.73–1.18)
CREAT/UREA NIT SERPL: 14
GFR SERPLBLD CREATININE-BSD FMLA CKD-EPI: >60 MLS/MIN/1.73/M2
GLUCOSE SERPL-MCNC: 81 MG/DL (ref 82–115)
GROUP & RH: NORMAL
INDIRECT COOMBS GEL: NORMAL
POTASSIUM SERPL-SCNC: 4.4 MMOL/L (ref 3.5–5.1)
SODIUM SERPL-SCNC: 134 MMOL/L (ref 136–145)

## 2022-08-28 PROCEDURE — 63600175 PHARM REV CODE 636 W HCPCS: Performed by: ORTHOPAEDIC SURGERY

## 2022-08-28 PROCEDURE — 27580: ICD-10-PCS | Mod: AS,LT,, | Performed by: PHYSICIAN ASSISTANT

## 2022-08-28 PROCEDURE — 27580 FUSION OF KNEE: CPT | Mod: 58,LT,, | Performed by: ORTHOPAEDIC SURGERY

## 2022-08-28 PROCEDURE — 20693 PR ADJUST EXTERN BONE FIX DEV W ANESTH: ICD-10-PCS | Mod: 58,51,LT, | Performed by: ORTHOPAEDIC SURGERY

## 2022-08-28 PROCEDURE — 27580: ICD-10-PCS | Mod: 58,LT,, | Performed by: ORTHOPAEDIC SURGERY

## 2022-08-28 PROCEDURE — 80048 BASIC METABOLIC PNL TOTAL CA: CPT | Performed by: INTERNAL MEDICINE

## 2022-08-28 PROCEDURE — C1713 ANCHOR/SCREW BN/BN,TIS/BN: HCPCS | Performed by: ORTHOPAEDIC SURGERY

## 2022-08-28 PROCEDURE — 63600175 PHARM REV CODE 636 W HCPCS: Performed by: NURSE PRACTITIONER

## 2022-08-28 PROCEDURE — 37000009 HC ANESTHESIA EA ADD 15 MINS: Performed by: ORTHOPAEDIC SURGERY

## 2022-08-28 PROCEDURE — 20693 ADJMT/REVJ EXT FIXJ SYS ANES: CPT | Mod: 58,51,LT, | Performed by: ORTHOPAEDIC SURGERY

## 2022-08-28 PROCEDURE — 11000001 HC ACUTE MED/SURG PRIVATE ROOM

## 2022-08-28 PROCEDURE — 25000003 PHARM REV CODE 250

## 2022-08-28 PROCEDURE — V2790 AMNIOTIC MEMBRANE: HCPCS | Performed by: ORTHOPAEDIC SURGERY

## 2022-08-28 PROCEDURE — 25000003 PHARM REV CODE 250: Performed by: INTERNAL MEDICINE

## 2022-08-28 PROCEDURE — 27580 FUSION OF KNEE: CPT | Mod: AS,LT,, | Performed by: PHYSICIAN ASSISTANT

## 2022-08-28 PROCEDURE — 25000003 PHARM REV CODE 250: Performed by: NURSE PRACTITIONER

## 2022-08-28 PROCEDURE — 63600175 PHARM REV CODE 636 W HCPCS

## 2022-08-28 PROCEDURE — 27800903 OPTIME MED/SURG SUP & DEVICES OTHER IMPLANTS: Performed by: ORTHOPAEDIC SURGERY

## 2022-08-28 PROCEDURE — 36415 COLL VENOUS BLD VENIPUNCTURE: CPT | Performed by: INTERNAL MEDICINE

## 2022-08-28 PROCEDURE — 86850 RBC ANTIBODY SCREEN: CPT | Performed by: ORTHOPAEDIC SURGERY

## 2022-08-28 PROCEDURE — 36000710: Performed by: ORTHOPAEDIC SURGERY

## 2022-08-28 PROCEDURE — 25000003 PHARM REV CODE 250: Performed by: ORTHOPAEDIC SURGERY

## 2022-08-28 PROCEDURE — 36000711: Performed by: ORTHOPAEDIC SURGERY

## 2022-08-28 PROCEDURE — 71000033 HC RECOVERY, INTIAL HOUR: Performed by: ORTHOPAEDIC SURGERY

## 2022-08-28 PROCEDURE — 37000008 HC ANESTHESIA 1ST 15 MINUTES: Performed by: ORTHOPAEDIC SURGERY

## 2022-08-28 PROCEDURE — C1769 GUIDE WIRE: HCPCS | Performed by: ORTHOPAEDIC SURGERY

## 2022-08-28 DEVICE — PATCH AMNION DUAL LAYER 4X6CM: Type: IMPLANTABLE DEVICE | Site: KNEE | Status: FUNCTIONAL

## 2022-08-28 DEVICE — IMPLANTABLE DEVICE: Type: IMPLANTABLE DEVICE | Site: KNEE | Status: FUNCTIONAL

## 2022-08-28 RX ORDER — BENZTROPINE MESYLATE 1 MG/1
1 TABLET ORAL DAILY
Status: DISCONTINUED | OUTPATIENT
Start: 2022-08-28 | End: 2022-09-04

## 2022-08-28 RX ORDER — PROPOFOL 10 MG/ML
VIAL (ML) INTRAVENOUS
Status: DISCONTINUED | OUTPATIENT
Start: 2022-08-28 | End: 2022-08-28

## 2022-08-28 RX ORDER — MIDAZOLAM HYDROCHLORIDE 1 MG/ML
INJECTION INTRAMUSCULAR; INTRAVENOUS
Status: DISCONTINUED | OUTPATIENT
Start: 2022-08-28 | End: 2022-08-28

## 2022-08-28 RX ORDER — FENTANYL CITRATE 50 UG/ML
INJECTION, SOLUTION INTRAMUSCULAR; INTRAVENOUS
Status: DISCONTINUED | OUTPATIENT
Start: 2022-08-28 | End: 2022-08-28

## 2022-08-28 RX ORDER — ROCURONIUM BROMIDE 10 MG/ML
INJECTION, SOLUTION INTRAVENOUS
Status: DISCONTINUED | OUTPATIENT
Start: 2022-08-28 | End: 2022-08-28

## 2022-08-28 RX ORDER — DEXAMETHASONE SODIUM PHOSPHATE 4 MG/ML
INJECTION, SOLUTION INTRA-ARTICULAR; INTRALESIONAL; INTRAMUSCULAR; INTRAVENOUS; SOFT TISSUE
Status: DISCONTINUED | OUTPATIENT
Start: 2022-08-28 | End: 2022-08-28

## 2022-08-28 RX ORDER — ONDANSETRON 2 MG/ML
INJECTION INTRAMUSCULAR; INTRAVENOUS
Status: DISCONTINUED | OUTPATIENT
Start: 2022-08-28 | End: 2022-08-28

## 2022-08-28 RX ORDER — HALOPERIDOL 5 MG/ML
5 INJECTION INTRAMUSCULAR ONCE
Status: COMPLETED | OUTPATIENT
Start: 2022-08-28 | End: 2022-08-28

## 2022-08-28 RX ORDER — PHENYLEPHRINE HYDROCHLORIDE 10 MG/ML
INJECTION INTRAVENOUS
Status: DISCONTINUED | OUTPATIENT
Start: 2022-08-28 | End: 2022-08-28

## 2022-08-28 RX ORDER — ACETAMINOPHEN 10 MG/ML
INJECTION, SOLUTION INTRAVENOUS
Status: DISCONTINUED | OUTPATIENT
Start: 2022-08-28 | End: 2022-08-28

## 2022-08-28 RX ORDER — CEFAZOLIN SODIUM 2 G/50ML
SOLUTION INTRAVENOUS
Status: DISCONTINUED
Start: 2022-08-28 | End: 2022-08-28

## 2022-08-28 RX ORDER — GLYCOPYRROLATE 0.2 MG/ML
INJECTION INTRAMUSCULAR; INTRAVENOUS
Status: DISCONTINUED | OUTPATIENT
Start: 2022-08-28 | End: 2022-08-28

## 2022-08-28 RX ADMIN — MIDAZOLAM HYDROCHLORIDE 2 MG: 1 INJECTION, SOLUTION INTRAMUSCULAR; INTRAVENOUS at 07:08

## 2022-08-28 RX ADMIN — FENTANYL CITRATE 100 MCG: 50 INJECTION, SOLUTION INTRAMUSCULAR; INTRAVENOUS at 07:08

## 2022-08-28 RX ADMIN — SODIUM CHLORIDE, SODIUM GLUCONATE, SODIUM ACETATE, POTASSIUM CHLORIDE AND MAGNESIUM CHLORIDE: 526; 502; 368; 37; 30 INJECTION, SOLUTION INTRAVENOUS at 07:08

## 2022-08-28 RX ADMIN — DEXTROSE MONOHYDRATE 2 G: 50 INJECTION, SOLUTION INTRAVENOUS at 07:08

## 2022-08-28 RX ADMIN — ACETAMINOPHEN 1000 MG: 10 INJECTION, SOLUTION INTRAVENOUS at 08:08

## 2022-08-28 RX ADMIN — HALOPERIDOL LACTATE 5 MG: 5 INJECTION, SOLUTION INTRAMUSCULAR at 03:08

## 2022-08-28 RX ADMIN — HYDROCODONE BITARTRATE AND ACETAMINOPHEN 1 TABLET: 5; 325 TABLET ORAL at 02:08

## 2022-08-28 RX ADMIN — DEXAMETHASONE SODIUM PHOSPHATE 8 MG: 4 INJECTION, SOLUTION INTRA-ARTICULAR; INTRALESIONAL; INTRAMUSCULAR; INTRAVENOUS; SOFT TISSUE at 07:08

## 2022-08-28 RX ADMIN — SUGAMMADEX 200 MG: 100 INJECTION, SOLUTION INTRAVENOUS at 08:08

## 2022-08-28 RX ADMIN — AMANTADINE HYDROCHLORIDE 100 MG: 100 CAPSULE, LIQUID FILLED ORAL at 10:08

## 2022-08-28 RX ADMIN — ROCURONIUM BROMIDE 50 MG: 10 INJECTION, SOLUTION INTRAVENOUS at 07:08

## 2022-08-28 RX ADMIN — GLYCOPYRROLATE 0.2 MG: 0.2 INJECTION INTRAMUSCULAR; INTRAVENOUS at 07:08

## 2022-08-28 RX ADMIN — ONDANSETRON 4 MG: 2 INJECTION INTRAMUSCULAR; INTRAVENOUS at 07:08

## 2022-08-28 RX ADMIN — PROPOFOL 150 MG: 10 INJECTION, EMULSION INTRAVENOUS at 07:08

## 2022-08-28 RX ADMIN — HYDROCODONE BITARTRATE AND ACETAMINOPHEN 1 TABLET: 5; 325 TABLET ORAL at 10:08

## 2022-08-28 RX ADMIN — HYDROCODONE BITARTRATE AND ACETAMINOPHEN 1 TABLET: 5; 325 TABLET ORAL at 05:08

## 2022-08-28 RX ADMIN — PHENYLEPHRINE HYDROCHLORIDE 100 MCG: 10 INJECTION INTRAVENOUS at 08:08

## 2022-08-28 RX ADMIN — BENZTROPINE MESYLATE 1 MG: 1 TABLET ORAL at 10:08

## 2022-08-28 RX ADMIN — HALOPERIDOL 5 MG: 5 TABLET ORAL at 09:08

## 2022-08-28 RX ADMIN — MUPIROCIN: 20 OINTMENT TOPICAL at 09:08

## 2022-08-28 RX ADMIN — AMANTADINE HYDROCHLORIDE 100 MG: 100 CAPSULE, LIQUID FILLED ORAL at 09:08

## 2022-08-28 RX ADMIN — MUPIROCIN: 20 OINTMENT TOPICAL at 10:08

## 2022-08-28 RX ADMIN — HYDROCODONE BITARTRATE AND ACETAMINOPHEN 1 TABLET: 5; 325 TABLET ORAL at 03:08

## 2022-08-28 RX ADMIN — ENOXAPARIN SODIUM 40 MG: 40 INJECTION SUBCUTANEOUS at 10:08

## 2022-08-28 NOTE — ANESTHESIA POSTPROCEDURE EVALUATION
Anesthesia Post Evaluation    Patient: Brandyn Ty    Procedure(s) Performed: Procedure(s) (LRB):  arthrodesis  (Left)    Final Anesthesia Type: general      Patient location during evaluation: PACU  Patient participation: Yes- Able to Participate  Level of consciousness: awake and alert  Post-procedure vital signs: reviewed and stable  Pain management: adequate  Airway patency: patent    PONV status at discharge: No PONV  Anesthetic complications: no      Respiratory status: unassisted  Hydration status: euvolemic  Follow-up not needed.          Vitals Value Taken Time   /88 08/28/22 0920   Temp 36.7 °C (98.1 °F) 08/28/22 0845   Pulse 87 08/28/22 1055   Resp 18 08/28/22 1008   SpO2 94 % 08/28/22 1055   Vitals shown include unvalidated device data.      Event Time   Out of Recovery 08/28/2022 09:15:00         Pain/Mariely Score: Pain Rating Prior to Med Admin: 10 (8/28/2022 10:08 AM)  Pain Rating Post Med Admin: 2 (8/28/2022 11:04 AM)  Mariely Score: 10 (8/28/2022  9:15 AM)

## 2022-08-28 NOTE — PROGRESS NOTES
[facility] Medicine Progress Note        Chief Complaint: Inpatient Follow-up for consult for med mgmt    HPI:   63 yo AAM with hx Schizophrenia and HTN who presents to the ED after a ground level fall landing on his left knee. He is an extremely poor historian. He sustained a displaced open femur fracture grade 3A vs B.  that warranted immediate surgical intervention. Dr. Chatterjee was consulted and pt underwent I&D and external fixation. The Hospitalist Service was consulted for medical management.    Was also found to have hyponatremia that is improving.  Plan for open reduction and internal fixation revision knee fusion today.  Patient ripped his IV lines last night and also refusing lab work      August 28 it patient had left knee arthrodesis revision and left knee external fixator adjustment  Interval Hx:  Patient seen and examined this morning had just come back from surgery patient is status post left knee arthrodesis revision and left knee external fixator adjustment    Objective/physical exam:  General: In no acute distress, afebrile  Chest: Clear to auscultation bilaterally  Heart: RRR, +S1, S2, no appreciable murmur  Abdomen: Soft, nontender, BS +  Neurologic: Alert and oriented   Left leg external fixation in place no obvious drainage    VITAL SIGNS: 24 HRS MIN & MAX LAST   Temp  Min: 97.6 °F (36.4 °C)  Max: 98.6 °F (37 °C) 98.1 °F (36.7 °C)   BP  Min: 115/70  Max: 171/90 (!) 156/88   Pulse  Min: 74  Max: 112  (!) 112   Resp  Min: 10  Max: 19 18   SpO2  Min: 95 %  Max: 100 % 99 %       Recent Labs   Lab 08/24/22 1827 08/27/22  0514   WBC 16.4* 11.1   RBC 4.65* 3.35*   HGB 13.7* 10.0*   HCT 39.6* 31.5*   MCV 85.2 94.0   MCH 29.5 29.9   MCHC 34.6 31.7*   RDW 12.7 13.8    326   MPV 9.2 9.5       Recent Labs   Lab 08/24/22 1827 08/25/22  0425 08/25/22  1220 08/27/22  0514 08/28/22  0635   *   < > 130* 130* 134*   K 4.0   < > 3.8 4.3 4.4   CO2 23   < > 25 23  24   BUN 9.4   < > 8.6 13.2 10.7   CREATININE 0.91   < > 0.95 0.79 0.78   CALCIUM 9.8   < > 9.9 9.3 9.3   ALBUMIN 4.2  --   --   --   --    ALKPHOS 71  --   --   --   --    ALT 19  --   --   --   --    AST 25  --   --   --   --    BILITOT 0.6  --   --   --   --     < > = values in this interval not displayed.          Microbiology Results (last 7 days)       ** No results found for the last 168 hours. **             See below for Radiology    Scheduled Med:   amantadine HCL  100 mg Oral BID    ceFAZolin 2 g/50mL Dextrose IVPB        enoxaparin  40 mg Subcutaneous Daily    haloperidoL  5 mg Oral Daily before evening meal    mupirocin   Nasal BID        Continuous Infusions:   sodium chloride 0.9% 50 mL/hr at 08/27/22 1010        PRN Meds:  hydrALAZINE, HYDROcodone-acetaminophen, HYDROmorphone, morphine, ondansetron, ondansetron, sodium chloride 0.9%, sodium chloride 0.9%, sodium chloride 0.9%       Assessment/Plan:   Hypovolemic Hyponatremia   Schizophrenia, unspecified  Essential HTN  Open Distal Left Femur Fracture s/p I&D and ORIF 8/24       Sodium is improving this morning it is 134  Psych had started the patient back on Haldol and even though patient has been on Haldol sodium is improving so less likely this hyponatremia was from held all  Will keep a close watch   Continue with IV fluids at this rate for 1 more day and repeat blood work in a.m.   Patient is status post left knee arthrodesis revision on August 28 and left knee external fixator adjustment  Ortho wants the patient to be nonweightbearing for approximately 8 weeks followed by ex fix removal  Continue with PT and OT   Repeat blood work in a.m.     prophylaxis Lovenox    Patient condition:  Stable    Anticipated discharge and Disposition: TBD      All diagnosis and differential diagnosis have been reviewed; assessment and plan has been documented; I have personally reviewed the labs and test results that are presently available; I have reviewed the  patients medication list; I have reviewed the consulting providers response and recommendations. I have reviewed or attempted to review medical records based upon their availability    All of the patient's questions have been  addressed and answered. Patient's is agreeable to the above stated plan. I will continue to monitor closely and make adjustments to medical management as needed.  _____________________________________________________________________      Radiology:  SURG FL Surgery Fluoro Usage  See OP Notes for results.     IMPRESSION: See OP Notes for results.     This procedure was auto-finalized by: Virtual Radiologist      Alivia Darling MD   08/28/2022

## 2022-08-28 NOTE — TRANSFER OF CARE
"Anesthesia Transfer of Care Note    Patient: Brandyn Ty    Procedure(s) Performed: Procedure(s) (LRB):  arthrodesis  (Left)    Patient location: PACU    Anesthesia Type: general    Transport from OR: Transported from OR on room air with adequate spontaneous ventilation    Post pain: adequate analgesia    Post assessment: no apparent anesthetic complications    Post vital signs: stable    Level of consciousness: responds to stimulation    Nausea/Vomiting: no nausea/vomiting    Complications: none    Transfer of care protocol was followed      Last vitals:   Visit Vitals  /69 (BP Location: Right arm, Patient Position: Lying)   Pulse 74   Temp 36.6 °C (97.9 °F)   Resp 12   Ht 5' 10" (1.778 m)   Wt 83.9 kg (185 lb)   SpO2 96%   BMI 26.54 kg/m²     "

## 2022-08-28 NOTE — ANESTHESIA PROCEDURE NOTES
Intubation    Date/Time: 8/28/2022 7:42 AM  Performed by: Derrick Barnhart CRNA  Authorized by: Jax Ochoa MD     Intubation:     Induction:  Intravenous    Intubated:  Postinduction    Mask Ventilation:  Easy mask    Attempts:  1    Attempted By:  CRNA    Method of Intubation:  Direct    Blade:  Homero 3    Laryngeal View Grade: Grade I - full view of cords      Difficult Airway Encountered?: No      Complications:  None    Airway Device:  Oral endotracheal tube    Airway Device Size:  7.0    Style/Cuff Inflation:  Cuffed (inflated to minimal occlusive pressure)    Tube secured:  22    Secured at:  The lips    Placement Verified By:  Capnometry    Complicating Factors:  None    Findings Post-Intubation:  BS equal bilateral and atraumatic/condition of teeth unchanged

## 2022-08-28 NOTE — PROGRESS NOTES
Patient is on the schedule today for revision fusion left knee.  There was some concern overnight due to his outbursts that he would not be able to consent from self this morning.  Patient has a woken from being lethargic and A and O x3.  He is agreeable to surgery and has been agreeable surgery throughout his entire stay.  He does need this surgery to begin ambulation.  Will continue with revision arthrodesis left knee      This note/OR report was created with the assistance of  voice recognition software or phone  dictation.  There may be transcription errors as a result of using this technology however minimal. Effort has been made to assure accuracy of transcription but any obvious errors or omissions should be clarified with the author of the document.       Shawn Chatterjee, DO  Orthopedic Trauma Surgery

## 2022-08-28 NOTE — OP NOTE
OPERATIVE REPORT      Patient: Brandyn Ty   : 1960    MRN: [id number]  Date: 2022      Surgeon:Shawn Chatterjee DO  Preoperative Diagnosis:  Left knee arthrodesis failure status post external fixation  Postoperative Diagnosis: Same  Procedure:  1.  Left knee arthrodesis revision 96248  2. Left knee ex fix adjustment    Anesthesiologist: Jax Ochoa MD  OR Staff: Circulator: Siomara Hernandez RN  Radiology Technologist: Kati Avery RT  Scrub Person: Deidra Schwarz, Patient Care Assistant  Implants:  Human allograft tissue for by 6 cm  Implant Name Type Inv. Item Serial No.  Lot No. LRB No. Used Action   BME-Elite Implant kit    SYNTHES [id number] Left 1 Implanted   PATCH AMNION DUAL LAYER 4X6CM - [id number]  PATCH AMNION DUAL LAYER 4X6CM  [employer]  Left 1 Implanted   Guide wire    [employer]  Left 3 Implanted and Explanted   [place]-lated screw    [employer]  Left 1 Implanted   Cannulated screw    SYNTHES  Left 1 Implanted   Cannulated screw    SYNTHES  Left 1 Implanted     EBL: 25xcc  Complications: None  Disposition: To PACU, stable    Indications: Brandyn Ty is a 62 y.o. male presenting with fracture through left knee previous arthrodesis status post failure.  He was placed in external fixator and washed appropriately approximately 3 days ago.. The procedure is indicated to provide stability of the left knee allow healing of the arthrodesis site  The patient is awake and alert. After thorough discussion of the risks, benefits, expected outcomes, and alternatives to surgical intervention, the patient agreed to proceed with surgical treatment.  Specific risks discussed included, but were not limited to: superficial or deep infection, wound healing complications, DVT/PE, significant bleeding requiring transfusion, damage to named anatomic structures in the immediate area including named neurovascular structures, infection, nonunion, malunion and general  risks of anesthesia.  The patient voiced understanding and written as well as verbal consent was obtained by myself prior to the procedure.    Procedure Note:  The patient was brought back to the OR and placed supine on the OR table. After successful induction of anesthesia by anesthesia staff, the patient was positioned in the supine position and all bony prominences were padded appropriately.  The surgical field was then provisionally cleansed and then prepped and draped in the usual sterile fashion.    At this time a time-out was performed, with the correct patient, site, and procedure identified.  The universal time out as well as sign your site protocols were followed.  Preoperative antibiotics were verified as administered.     Attention drawn to the left knee no signs of infection.  Ex fix appeared loose proximally we adjusted the ex fix.  Attention is drawn to the transverse traumatic laceration I removed sutures from the lateral aspect of the knee and bluntly dissected down to the fracture site of the arthrodesis.  There is motion at the arthrodesis site I then reduced the arthrodesis site after copious amounts irrigation and excisional debridement with a 15 blade periosteum necrotic fat with a Synthes staple received excellent compression at the arthrodesis site.  Satisfied length alignment rotation I then placed multiple 7.5 headless compression screws to provide compression across the arthrodesis site.  We then final adjust the ex fix with the tightening all bolts.  Satisfied length alignment rotation under intraoperative fluoroscopy I then copiously irrigated followed by vancomycin followed by placement of human allograft tissue to encourage healing at the arthrodesis site.    The incision(s) was/were then irrigated using copious sterile saline and then vancomyocin was added to the wound bed for prophylaxis. The surgical wound was closed in layered fashion.  The surgical site(s) was/were were sterilely  cleansed and dressed.    The patient was then subsequently transferred to to PACU in a stable condition.    All sponge and needle counts were correct at the end of the case.  I was present and participated in all aspects of the procedure.    Prognosis:  The patient will be kept NWB on the ipsilateral extremity for approximately 8 weeks followed by ex fix removal .  Patient will receive DVT prophylaxis .       This note/OR report was created with the assistance of  voice recognition software or phone  dictation.  There may be transcription errors as a result of using this technology however minimal. Effort has been made to assure accuracy of transcription but any obvious errors or omissions should be clarified with the author of the document.       Shawn Chatterjee, DO  Orthopedic Trauma Surgery

## 2022-08-29 LAB
ANION GAP SERPL CALC-SCNC: 5 MEQ/L
BUN SERPL-MCNC: 11.9 MG/DL (ref 8.4–25.7)
CALCIUM SERPL-MCNC: 9 MG/DL (ref 8.8–10)
CHLORIDE SERPL-SCNC: 104 MMOL/L (ref 98–107)
CO2 SERPL-SCNC: 24 MMOL/L (ref 23–31)
CREAT SERPL-MCNC: 0.81 MG/DL (ref 0.73–1.18)
CREAT/UREA NIT SERPL: 15
GFR SERPLBLD CREATININE-BSD FMLA CKD-EPI: >60 MLS/MIN/1.73/M2
GLUCOSE SERPL-MCNC: 112 MG/DL (ref 82–115)
POTASSIUM SERPL-SCNC: 4.2 MMOL/L (ref 3.5–5.1)
SODIUM SERPL-SCNC: 133 MMOL/L (ref 136–145)

## 2022-08-29 PROCEDURE — 97164 PT RE-EVAL EST PLAN CARE: CPT

## 2022-08-29 PROCEDURE — 25000003 PHARM REV CODE 250

## 2022-08-29 PROCEDURE — 25000003 PHARM REV CODE 250: Performed by: NURSE PRACTITIONER

## 2022-08-29 PROCEDURE — 63600175 PHARM REV CODE 636 W HCPCS: Performed by: ORTHOPAEDIC SURGERY

## 2022-08-29 PROCEDURE — 11000001 HC ACUTE MED/SURG PRIVATE ROOM

## 2022-08-29 PROCEDURE — 97168 OT RE-EVAL EST PLAN CARE: CPT

## 2022-08-29 PROCEDURE — 25000003 PHARM REV CODE 250: Performed by: INTERNAL MEDICINE

## 2022-08-29 PROCEDURE — 80048 BASIC METABOLIC PNL TOTAL CA: CPT | Performed by: INTERNAL MEDICINE

## 2022-08-29 PROCEDURE — 36415 COLL VENOUS BLD VENIPUNCTURE: CPT | Performed by: INTERNAL MEDICINE

## 2022-08-29 RX ADMIN — HALOPERIDOL 5 MG: 5 TABLET ORAL at 08:08

## 2022-08-29 RX ADMIN — BENZTROPINE MESYLATE 1 MG: 1 TABLET ORAL at 09:08

## 2022-08-29 RX ADMIN — HYDROCODONE BITARTRATE AND ACETAMINOPHEN 1 TABLET: 5; 325 TABLET ORAL at 09:08

## 2022-08-29 RX ADMIN — HYDROCODONE BITARTRATE AND ACETAMINOPHEN 1 TABLET: 5; 325 TABLET ORAL at 01:08

## 2022-08-29 RX ADMIN — HYDROCODONE BITARTRATE AND ACETAMINOPHEN 1 TABLET: 5; 325 TABLET ORAL at 08:08

## 2022-08-29 NOTE — PT/OT/SLP RE-EVAL
Physical Therapy Re-evaluation    Patient Name:  Brandyn Ty   MRN:  [id number]    Recommendations:     Discharge Recommendations:  rehabilitation facility   Discharge Equipment Recommendations: walker, rolling   Barriers to discharge:  acuity of illness, severity of deficits    Assessment:     Brandyn Ty is a 62 y.o. male admitted with a medical diagnosis of Displaced fracture of lateral condyle of left femur, initial encounter for open fracture type IIIA, IIIB, or IIIC.  He presents with the following impairments/functional limitations:  weakness, impaired endurance, impaired functional mobility, gait instability, impaired balance, decreased lower extremity function, pain, decreased ROM, orthopedic precautions. PT was re-consulted after pt had another sx for L knee arthrodesis revision and ex-fix adjustment so a re-evaluation was performed.     Rehab Prognosis:  good; patient would benefit from acute skilled PT services to address these deficits and reach maximum level of function.      Recent Surgery: Procedure(s) (LRB):  arthrodesis  (Left) 1 Day Post-Op    Plan:     During this hospitalization, patient to be seen daily to BID to address the above listed problems via gait training, therapeutic activities, therapeutic exercises, neuromuscular re-education  Plan of Care Expires:  09/28/22  Plan of Care Reviewed with: patient    Subjective     Communicated with RN prior to session.  Patient found HOB elevated with peripheral IV, external fixator upon PT entry to room, agreeable to evaluation.      Chief Complaint: pain  Patient comments/goals: to get stronger  Pain/Comfort:  Pain Rating 1: 7/10  Location - Side 1: Left  Location 1: leg  Pain Addressed 1: Nurse notified    Patients cultural, spiritual, Pentec[employer]al conflicts given the current situation: no      Objective:     Patient found with: peripheral IV, external fixator     General Precautions: Standard,     Orthopedic Precautions:LLE non  weight bearing   Braces: N/A  Respiratory Status: Room air    Exams:  Cognitive Exam:  Patient is oriented to Person and Place    Functional Mobility:  Bed Mobility:     Supine to Sit: minimum assistance  Transfers:     Sit to Stand:  minimum assistance with rolling walker  Gait: pt demo'd a slow hop to gt pattern x 16 ft w/ use of RW and Sophie.     AM-PAC 6 CLICK MOBILITY  Total Score:18         Patient left up in chair with all lines intact, call button in reach, chair alarm on, and RN notified.    GOALS:   Multidisciplinary Problems       Physical Therapy Goals          Problem: Physical Therapy    Goal Priority Disciplines Outcome Goal Variances Interventions   Physical Therapy Goal     PT, PT/OT      Description: Goals to be met by: 2022     Patient will increase functional independence with mobility by performin. Supine to sit with Modified [place]  2. Sit to supine with Modified [place]  3. Sit to stand transfer with Stand-by Assistance  4. Gait  x 200 feet with Modified [place] using Rolling Walker.                          History:     Past Medical History:   Diagnosis Date    Anxiety     Hypertension        Past Surgical History:   Procedure Laterality Date    APPLICATION OF LARGE EXTERNAL FIXATION DEVICE TO TIBIA Left 2022    Procedure: APPLICATION, EXTERNAL FIXATION DEVICE, LARGE, TIBIA;  Surgeon: Shawn Chatterjee DO;  Location: [facility];  Service: Orthopedics;  Laterality: Left;       Time Tracking:     PT Received On: 22  PT Start Time: 1021     PT Stop Time: 1035  PT Total Time (min): 14 min     Billable Minutes: Re-eval , 14 mins      2022

## 2022-08-29 NOTE — PROGRESS NOTES
[facility] Medicine Progress Note        Chief Complaint: Inpatient Follow-up for consult for med mgmt    HPI:   61 yo AAM with hx Schizophrenia and HTN who presents to the ED after a ground level fall landing on his left knee. He is an extremely poor historian. He sustained a displaced open femur fracture grade 3A vs B.  that warranted immediate surgical intervention. Dr. Chatterjee was consulted and pt underwent I&D and external fixation. The Hospitalist Service was consulted for medical management.    Was also found to have hyponatremia that is improving.  Plan for open reduction and internal fixation revision knee fusion today.  Patient ripped his IV lines last night and also refusing lab work      August 28 it patient had left knee arthrodesis revision and left knee external fixator adjustment  Interval Hx:  Patient seen and examined this morning had just come back from surgery patient is status post left knee arthrodesis revision and left knee external fixator adjustment    Objective/physical exam:  General: In no acute distress, afebrile  Chest: Clear to auscultation bilaterally  Heart: RRR, +S1, S2, no appreciable murmur  Abdomen: Soft, nontender, BS +  Neurologic: Alert and oriented   Left leg external fixation in place no obvious drainage    VITAL SIGNS: 24 HRS MIN & MAX LAST   Temp  Min: 98 °F (36.7 °C)  Max: 98.5 °F (36.9 °C) 98.5 °F (36.9 °C)   BP  Min: 101/62  Max: 153/73 121/67   Pulse  Min: 74  Max: 85  74   Resp  Min: 16  Max: 19 18   SpO2  Min: 93 %  Max: 99 % 99 %       Recent Labs   Lab 08/24/22 1827 08/27/22  0514   WBC 16.4* 11.1   RBC 4.65* 3.35*   HGB 13.7* 10.0*   HCT 39.6* 31.5*   MCV 85.2 94.0   MCH 29.5 29.9   MCHC 34.6 31.7*   RDW 12.7 13.8    326   MPV 9.2 9.5       Recent Labs   Lab 08/24/22 1827 08/25/22  0425 08/27/22  0514 08/28/22  0635 08/29/22  0554   *   < > 130* 134* 133*   K 4.0   < > 4.3 4.4 4.2   CO2 23   < > 23 24 24   BUN  9.4   < > 13.2 10.7 11.9   CREATININE 0.91   < > 0.79 0.78 0.81   CALCIUM 9.8   < > 9.3 9.3 9.0   ALBUMIN 4.2  --   --   --   --    ALKPHOS 71  --   --   --   --    ALT 19  --   --   --   --    AST 25  --   --   --   --    BILITOT 0.6  --   --   --   --     < > = values in this interval not displayed.          Microbiology Results (last 7 days)       ** No results found for the last 168 hours. **             See below for Radiology    Scheduled Med:   amantadine HCL  100 mg Oral BID    benztropine  1 mg Oral Daily    enoxaparin  40 mg Subcutaneous Daily    haloperidoL  5 mg Oral Daily before evening meal    mupirocin   Nasal BID        Continuous Infusions:   sodium chloride 0.9% 50 mL/hr at 08/27/22 1010        PRN Meds:  hydrALAZINE, HYDROcodone-acetaminophen, HYDROmorphone, morphine, ondansetron, ondansetron, sodium chloride 0.9%, sodium chloride 0.9%, sodium chloride 0.9%       Assessment/Plan:   Hypovolemic Hyponatremia   Schizophrenia, unspecified  Essential HTN  Open Distal Left Femur Fracture s/p I&D and ORIF 8/24       Sodium is improving this morning it is 133  Patient was started back on her doll sodium is improving this morning it is 133 and we have resume benztropine too  Patient is status post left knee arthrodesis revision on August 28 and left knee external fixator adjustment  Ortho wants the patient to be nonweightbearing for approximately 8 weeks followed by ex fix removal  Will see how patient is doing with physical therapy accordingly will do the discharge disposition  Continue with PT and OT   Repeat blood work in a.m.     prophylaxis Lovenox    Patient condition:  Stable    Anticipated discharge and Disposition: TBD      All diagnosis and differential diagnosis have been reviewed; assessment and plan has been documented; I have personally reviewed the labs and test results that are presently available; I have reviewed the patients medication list; I have reviewed the consulting providers  response and recommendations. I have reviewed or attempted to review medical records based upon their availability    All of the patient's questions have been  addressed and answered. Patient's is agreeable to the above stated plan. I will continue to monitor closely and make adjustments to medical management as needed.  _____________________________________________________________________      Radiology:  SURG FL Surgery Fluoro Usage  See OP Notes for results.     IMPRESSION: See OP Notes for results.     This procedure was auto-finalized by: Virtual Radiologist      Alivia Darling MD   08/29/2022

## 2022-08-29 NOTE — PT/OT/SLP RE-EVAL
Occupational Therapy   Re-evaluation    Name: Brandyn Ty  MRN: [id number]  Admitting Diagnosis:  L tib fx, L femur fx s/p ex-fix   Recent Surgery: Procedure(s) (LRB):  arthrodesis  (Left) 1 Day Post-Op    Recommendations:     Discharge Recommendations: rehabilitation facility  Discharge Equipment Recommendations:  walker, rolling  Barriers to discharge:  Other (Comment) (Severity of deficits)    Assessment:     Brandyn Ty is a 62 y.o. male with a medical diagnosis of Displaced fracture of lateral condyle of left femur, initial encounter for open fracture type IIIA, IIIB, or IIIC.  Performance deficits affecting function are impaired functional mobility, decreased safety awareness, impaired endurance, pain, impaired balance, impaired self care skills, orthopedic precautions.      Rehab Prognosis:  Good; patient would benefit from acute skilled OT services to address these deficits and reach maximum level of function.       Plan:     Patient to be seen 5 x/week, daily to address the above listed problems via self-care/home management, therapeutic activities, therapeutic exercises  Plan of Care Expires: 09/12/22  Plan of Care Reviewed with: patient    Subjective     Chief Complaint: Pain in LLE   Patient/Family stated goals: To return to PLOF   Communicated with: RN prior to session.  Pain/Comfort:  Pain Rating 1: 10/10  Location - Side 1: Left  Location 1: leg  Pain Addressed 1: Reposition    Objective:     Communicated with: RN prior to session.  Patient found up in chair with: external fixator upon OT entry to room.    General Precautions: Standard, fall   Orthopedic Precautions:LLE non weight bearing   Braces: N/A  Respiratory Status: Room air    Occupational Performance:    Bed Mobility:    Did not occur; Pt seated [facility] upon arrival     Functional Mobility/Transfers:  Patient completed Sit <> Stand Transfer with minimum assistance  with  rolling walker   Patient completed Toilet Transfer hop  to technique with minimum assistance with  rolling walker  Functional Mobility: Pt ambulated to bathroom using RW c Min A for balance     Activities of Daily Living:  Lower Body Dressing: stand by assistance Doff/don R sock  Toileting: stand by assistance pericare    Cognitive/Visual Perceptual:  Cognitive/Psychosocial Skills:     -       Mood/Affect/Coping skills/emotional control: Cooperative    Physical Exam:  Upper Extremity Strength:    -       Right Upper Extremity: WFL  -       Left Upper Extremity: WFL    Treatment & Education:  Educated pt on OT POC and precautions- pt verbalized understanding.     Patient left up in chair with call button in reach and chair alarm on    GOALS:   Multidisciplinary Problems       Occupational Therapy Goals          Problem: Occupational Therapy    Goal Priority Disciplines Outcome Interventions   Occupational Therapy Goal     OT, PT/OT Ongoing, Progressing    Description: Goals to be met by: 9/8    Patient will increase functional independence with ADLs by performing:    LE Dressing with Modified [place].  Grooming while standing at sink with Modified [place].  Toileting from toilet with Modified [place] for hygiene and clothing management.   Toilet transfer to toilet with Modified [place].                         History:     Past Medical History:   Diagnosis Date    Anxiety     Hypertension          Past Surgical History:   Procedure Laterality Date    APPLICATION OF LARGE EXTERNAL FIXATION DEVICE TO TIBIA Left 8/24/2022    Procedure: APPLICATION, EXTERNAL FIXATION DEVICE, LARGE, TIBIA;  Surgeon: Shawn Chatterjee DO;  Location: [facility];  Service: Orthopedics;  Laterality: Left;       Time Tracking:     OT Date of Treatment: 08/29/22  OT Start Time: 1117  OT Stop Time: 1130  OT Total Time (min): 13 min    Billable Minutes:Re-eval 1 unit    8/29/2022

## 2022-08-29 NOTE — PROGRESS NOTES
[facility] Neuro  Orthopedics  Progress Note    Patient Name: Brandyn Ty  MRN: [id number]  Admission Date: 8/24/2022  Hospital Length of Stay: 5 days  Attending Provider: Shawn Chatterjee DO  Primary Care Provider: Elizabeth Marin MD  Follow-up For: Procedure(s) (LRB):  arthrodesis  (Left)    Post-Operative Day: 1 Day Post-Op  Subjective:     Principal Problem:<principal problem not specified>    Principal Orthopedic Problem: 1 Day Post-Op left revision knee arthrodesis    Interval History: Patient doing well following surgery. He states that his pain remains well controlled although he does have some burning sensation in the calf and ankle. States he has not been up with physical therapy at this time. He is eager for discharge home. Discussed discharge with Ex-fix in place and need for this to remain in for approx 2 months while we allow his fusion to heal. He is understandable of the plan.    Review of patient's allergies indicates:   Allergen Reactions    Chlorpromazine      Other reaction(s): Chlorpromazine, Unknown - See comments    Fluphenazine      Other reaction(s): Fluphenazine, Unknown - See comments    Trazodone      Other reaction(s): Trazodone, Unknown - See comments       Current Facility-Administered Medications   Medication    0.9%  NaCl infusion    amantadine HCL capsule/tablet 100 mg    benztropine tablet 1 mg    enoxaparin injection 40 mg    haloperidoL tablet 5 mg    hydrALAZINE injection 10 mg    HYDROcodone-acetaminophen 5-325 mg per tablet 1 tablet    HYDROmorphone (PF) injection 0.4 mg    morphine injection 4 mg    mupirocin 2 % ointment    ondansetron injection 4 mg    ondansetron injection 4 mg    sodium chloride 0.9% flush 10 mL    sodium chloride 0.9% flush 10 mL    sodium chloride 0.9% flush 10 mL     Objective:     Vital Signs (Most Recent):  Temp: 98.5 °F (36.9 °C) (08/29/22 0724)  Pulse: 74 (08/29/22 0724)  Resp: 19 (08/29/22 0724)  BP: 121/67 (08/29/22  "0724)  SpO2: 99 % (08/29/22 0724) Vital Signs (24h Range):  Temp:  [98 °F (36.7 °C)-98.5 °F (36.9 °C)] 98.5 °F (36.9 °C)  Pulse:  [] 74  Resp:  [10-19] 19  SpO2:  [93 %-99 %] 99 %  BP: (101-159)/(61-95) 121/67     Weight: 83.9 kg (185 lb)  Height: 5' 10" (177.8 cm)  Body mass index is 26.54 kg/m².      Intake/Output Summary (Last 24 hours) at 8/29/2022 0753  Last data filed at 8/28/2022 0817  Gross per 24 hour   Intake 100 ml   Output --   Net 100 ml       Physical Exam:   Musculoskeletal:     Left lower extremity: ex fix to the LLE remains in place.Pin sites are clean and dry without erythema today. Soft dressing in place to the left knee and lower leg without obvious signs of drainage; compartments are soft and compressible; No pain with ROM at the ankle or hip. No range of motion at the knee; appropriate tenderness to palpation; dorsi/plantar flexes the foot; SILT distally; BCR distally; DP pulse palpable      Diagnostic Findings:   Significant Labs:   Recent Lab Results         08/29/22  0554   08/28/22  0635   08/27/22  0514        Anion Gap 5.0   9.0   7.0       Baso #     0.11       Basophil %     1.0       BUN 11.9   10.7   13.2       BUN/CREAT RATIO 15   14   17       Calcium 9.0   9.3   9.3       Chloride 104   101   100       CO2 24   24   23       Creatinine 0.81   0.78   0.79       eGFR >60   >60   >60       Eos #     0.42       Eosinophil %     3.8       Glucose 112   81   80       Group & Rh   B POS         Hematocrit     31.5       Hemoglobin     10.0       Immature Grans (Abs)     0.05       Immature Granulocytes     0.5       Indirect Stephen GEL   NEG         Lymph #     2.03       LYMPH %     18.3       MCH     29.9       MCHC     31.7       MCV     94.0       Mono #     1.26       Mono %     11.3       MPV     9.5       Neut #     7.2       Neut %     65.1       nRBC     0.0       Platelets     326       Potassium 4.2   4.4   4.3       RBC     3.35       RDW     13.8       Sodium 133   " 134   130       WBC     11.1                Significant Imaging: I have reviewed and interpreted all pertinent imaging results/findings.     Assessment/Plan:     Active Diagnoses:    Diagnosis Date Noted POA    Displaced fracture of lateral condyle of left femur, initial encounter for open fracture type IIIA, IIIB, or IIIC [S72.422C] 08/24/2022 Unknown    Type III open displaced fracture of medial condyle of left tibia with routine healing [S82.132F] 08/24/2022 Not Applicable      Problems Resolved During this Admission:     H&H down slightly but stable following surgery.   Remain NWB to the LLE. No range of motion to the left knee. Ex fix to remain in place x 8 weeks followed by removal and progressive weight bearing at that time.   Lovenox for DVT ppx during stay.   We will have him work with therapy on mobilizing today. If he does well he may be able to discharge home tomorrow vs Wednesday.  Appreciate medicine recommendations.   Please call with questions or concerns.   Follow up with Dr. Chatterjee in approx 3 weeks for wound check and repeat x-rays.    The above findings, diagnostics, and treatment plan were discussed with Dr. Chatterjee who is in agreement with the plan of care except as stated in additional documentation.      Obdulia Pereyra PA-C  Orthopedic Trauma Surgery  [facility]

## 2022-08-29 NOTE — CONSULTS
"[facility]  Psychiatry  History & Physical    Patient Name: Brandyn Ty  MRN: [id number]   Code Status: No Order  Admission Date: 8/24/2022  Attending Physician: Shawn Chatterjee DO   Primary Care Provider: Elizabeth Marin MD    Current Legal Status: Uncontested    Patient information was obtained from patient and ER records.     Subjective:     Principal Problem:Displaced fracture of lateral condyle of left femur, initial encounter for open fracture type IIIA, IIIB, or IIIC    Chief Complaint:  "I'm ok. Just need my medicine"     HPI: Patient here post fall and ORIF. Patient endorses a long history of schizophrenia, including several hospital stays. He states that he has bee successfully treated with Haldol for several years. Patient states that he has not had his medicine in 3 -4 days. He endorses a history of auditory hallucinations but denies any current AVH. States that he last heard voices yesterday. He denies that they are telling him to harm himself or others. He states that they have been very stable with his medication. Patient states that he is currently experiencing some mild depression and anxiety but nothing that is overwhelming.     8/27/22 Psych FU  Patient sitting up in bed with eyes closed and full food tray present at time of assessment. Patient continues to want to restart Haldol, but MD has requested this be held due to low sodium. Given this as a side effect of other antipsychotics it is recommended that this class of medication be held until Na is corrected. Patient denies any current AVH. He denies any SI/HI.     8/28 Psych F/U  Patient resting in bed with eyes closed post surgical procedure on knee. Rn states that he has been quiet and resting since returning from his sx. She states that he had a rough night last night and became irritable. Patient has still been requesting his Haldol which remains on hold due to decreased sodium level. His labs have " started to improve so we will continue to watch and restart Haldol once stable and appropriate.          Patient History               Medical as of 8/29/2022       Past Medical History       Diagnosis Date Comments Source    Anxiety -- -- Provider    Hypertension -- -- Provider                          Surgical as of 8/29/2022       Past Surgical History       Procedure Laterality Date Comments Source    APPLICATION OF LARGE EXTERNAL FIXATION DEVICE TO TIBIA Left 8/24/2022 Procedure: APPLICATION, EXTERNAL FIXATION DEVICE, LARGE, TIBIA;  Surgeon: Shawn Chatterjee DO;  Location: [facility];  Service: Orthopedics;  Laterality: Left; Provider                          Family as of 8/29/2022    Family history is unknown by patient.               Tobacco Use as of 8/29/2022       Smoking Status Smoking Start Date Quit Date Smoking Frequency    Never -- --       Smokeless Status Smokeless Type Smokeless Quit Date    Never -- --      Source    Provider                  Alcohol Use as of 8/29/2022       Alcohol Use Drinks/Week Alcohol/Week Comments Source    Not Currently   -- -- Provider                  Drug Use as of 8/29/2022       Drug Use Types Frequency Comments Source    -- -- -- -- Provider                  Sexual Activity as of 8/29/2022       Sexually Active Birth Control Partners Comments Source    -- -- -- -- Provider                  Activities of Daily Living as of 8/29/2022    None               Social Documentation as of 8/29/2022    None               Occupational as of 8/29/2022       Occupation Employer Comments Source    disabled -- -- Provider                  Socioeconomic as of 8/29/2022       Marital Status Spouse Name Number of Children Years Education Education Level Preferred Language Ethnicity Race Source    Single -- -- -- -- English Not  or /a Black or  Provider                  Pertinent History       Question Response Comments    Lives with -- --    Place in Birth  Order -- --    Lives in -- --    Number of Siblings -- --    Raised by -- --    Legal Involvement -- --    Childhood Trauma -- --    Criminal History of -- --    Financial Status -- --    Highest Level of Education -- --    Does patient have access to a firearm? -- --     Service -- --    Primary Leisure Activity -- --    Spirituality -- --          Past Medical History:   Diagnosis Date    Anxiety     Hypertension      Past Surgical History:   Procedure Laterality Date    APPLICATION OF LARGE EXTERNAL FIXATION DEVICE TO TIBIA Left 8/24/2022    Procedure: APPLICATION, EXTERNAL FIXATION DEVICE, LARGE, TIBIA;  Surgeon: Shawn Chatterjee DO;  Location: [facility];  Service: Orthopedics;  Laterality: Left;     Family History       Family history is unknown by patient.          Tobacco Use    Smoking status: Never    Smokeless tobacco: Never   Substance and Sexual Activity    Alcohol use: Not Currently    Drug use: Not on file    Sexual activity: Not on file     Review of patient's allergies indicates:   Allergen Reactions    Chlorpromazine      Other reaction(s): Chlorpromazine, Unknown - See comments    Fluphenazine      Other reaction(s): Fluphenazine, Unknown - See comments    Trazodone      Other reaction(s): Trazodone, Unknown - See comments       No current facility-administered medications on file prior to encounter.     Current Outpatient Medications on File Prior to Encounter   Medication Sig    acetaminophen (TYLENOL) 500 MG tablet Take 500 mg by mouth 2 (two) times daily.    amantadine HCl (SYMMETREL) 100 mg capsule amantadine HCl 100 mg capsule   PO BiD    amoxicillin-clavulanate 875-125mg (AUGMENTIN) 875-125 mg per tablet Take 1 tablet by mouth every 12 (twelve) hours. (Patient not taking: Reported on 2/6/2020)    benztropine (COGENTIN) 1 MG tablet benztropine 1 mg tablet    dicyclomine (BENTYL) 20 mg tablet TAKE 1 TABLET BY MOUTH 4 TIMES DAILY    DULoxetine (CYMBALTA) 30 MG capsule Take 30 mg by mouth  "once daily.    fluticasone propionate (FLONASE) 50 mcg/actuation nasal spray 1 spray by Each Nostril route 2 (two) times daily.    haloperidol (HALDOL) 5 MG tablet     naproxen (NAPROSYN) 500 MG tablet Take 500 mg by mouth 2 (two) times daily as needed.    simvastatin (ZOCOR) 20 MG tablet Take 1 tablet (20 mg total) by mouth every evening.     Psychotherapeutics (From admission, onward)      Start     Stop Route Frequency Ordered    08/26/22 1800  haloperidoL tablet 5 mg        Question:  Is the patient competent?  Answer:  Yes    -- Oral Daily before evening meal 08/26/22 1754          Review of Systems  Strengths and Liabilities: Strength: Patient accepts guidance/feedback, Strength: Patient has reasonable judgment., Liability: Patient has no suport network.    Objective:     Vital Signs (Most Recent):  Temp: 98.5 °F (36.9 °C) (08/29/22 0724)  Pulse: 74 (08/29/22 0724)  Resp: 18 (08/29/22 0913)  BP: 121/67 (08/29/22 0724)  SpO2: 99 % (08/29/22 0724) Vital Signs (24h Range):  Temp:  [98 °F (36.7 °C)-98.5 °F (36.9 °C)] 98.5 °F (36.9 °C)  Pulse:  [74-87] 74  Resp:  [16-19] 18  SpO2:  [93 %-99 %] 99 %  BP: (101-153)/(61-73) 121/67     Height: 5' 10" (177.8 cm)  Weight: 83.9 kg (185 lb)  Body mass index is 26.54 kg/m².    No intake or output data in the 24 hours ending 08/29/22 0945      Physical Exam        Significant Labs:   Last 24 Hours:   Recent Lab Results         08/29/22  0554        Anion Gap 5.0       BUN 11.9       BUN/CREAT RATIO 15       Calcium 9.0       Chloride 104       CO2 24       Creatinine 0.81       eGFR >60       Glucose 112       Potassium 4.2       Sodium 133               Significant Imaging: None    Assessment/Plan:     Active Diagnoses:    Diagnosis Date Noted POA    PRINCIPAL PROBLEM:  Displaced fracture of lateral condyle of left femur, initial encounter for open fracture type IIIA, IIIB, or IIIC [S72.422C] 08/24/2022 Yes    Type III open displaced fracture of medial condyle of left tibia " with routine healing [S82.132F] 08/24/2022 Not Applicable      Problems Resolved During this Admission:      Estimated Discharge Date:   Initial Discharge Plan: Home     Prognosis: Guarded    Need for Continued Hospitalization:   No need for inpatient psychiatric hospitalization. Continue medical care as per the primary team.    Skip Doll, PMHNP   Psychiatry  [facility]

## 2022-08-29 NOTE — PLAN OF CARE
Pt would like to see if [place] [facility] will accept.   Pt resides at his apt [facility] [address] [place] and plans to return there. He has lived there 8 months. Pt uses a cane at home. He is not  and has no children.   We discussed other issues and [place] referral given. He will fill it out tonight and I will follow up with it tomorrow  Referral sent to [facility]

## 2022-08-30 LAB
ANION GAP SERPL CALC-SCNC: 7 MEQ/L
BASOPHILS # BLD AUTO: 0.08 X10(3)/MCL (ref 0–0.2)
BASOPHILS NFR BLD AUTO: 0.6 %
BUN SERPL-MCNC: 9.7 MG/DL (ref 8.4–25.7)
CALCIUM SERPL-MCNC: 9.1 MG/DL (ref 8.8–10)
CHLORIDE SERPL-SCNC: 104 MMOL/L (ref 98–107)
CO2 SERPL-SCNC: 25 MMOL/L (ref 23–31)
CREAT SERPL-MCNC: 0.81 MG/DL (ref 0.73–1.18)
CREAT/UREA NIT SERPL: 12
EOSINOPHIL # BLD AUTO: 0.46 X10(3)/MCL (ref 0–0.9)
EOSINOPHIL NFR BLD AUTO: 3.5 %
ERYTHROCYTE [DISTWIDTH] IN BLOOD BY AUTOMATED COUNT: 14.2 % (ref 11.5–17)
GFR SERPLBLD CREATININE-BSD FMLA CKD-EPI: >60 MLS/MIN/1.73/M2
GLUCOSE SERPL-MCNC: 80 MG/DL (ref 82–115)
HCT VFR BLD AUTO: 30.9 % (ref 42–52)
HGB BLD-MCNC: 9.6 GM/DL (ref 14–18)
IMM GRANULOCYTES # BLD AUTO: 0.06 X10(3)/MCL (ref 0–0.04)
IMM GRANULOCYTES NFR BLD AUTO: 0.5 %
LYMPHOCYTES # BLD AUTO: 2.08 X10(3)/MCL (ref 0.6–4.6)
LYMPHOCYTES NFR BLD AUTO: 15.8 %
MCH RBC QN AUTO: 29.5 PG (ref 27–31)
MCHC RBC AUTO-ENTMCNC: 31.1 MG/DL (ref 33–36)
MCV RBC AUTO: 95.1 FL (ref 80–94)
MONOCYTES # BLD AUTO: 1.28 X10(3)/MCL (ref 0.1–1.3)
MONOCYTES NFR BLD AUTO: 9.7 %
NEUTROPHILS # BLD AUTO: 9.2 X10(3)/MCL (ref 2.1–9.2)
NEUTROPHILS NFR BLD AUTO: 69.9 %
NRBC BLD AUTO-RTO: 0 %
PLATELET # BLD AUTO: 384 X10(3)/MCL (ref 130–400)
PMV BLD AUTO: 8.9 FL (ref 7.4–10.4)
POTASSIUM SERPL-SCNC: 4.5 MMOL/L (ref 3.5–5.1)
RBC # BLD AUTO: 3.25 X10(6)/MCL (ref 4.7–6.1)
SODIUM SERPL-SCNC: 136 MMOL/L (ref 136–145)
WBC # SPEC AUTO: 13.2 X10(3)/MCL (ref 4.5–11.5)

## 2022-08-30 PROCEDURE — 80048 BASIC METABOLIC PNL TOTAL CA: CPT | Performed by: INTERNAL MEDICINE

## 2022-08-30 PROCEDURE — 25000003 PHARM REV CODE 250

## 2022-08-30 PROCEDURE — 36415 COLL VENOUS BLD VENIPUNCTURE: CPT | Performed by: INTERNAL MEDICINE

## 2022-08-30 PROCEDURE — 25000003 PHARM REV CODE 250: Performed by: NURSE PRACTITIONER

## 2022-08-30 PROCEDURE — 63600175 PHARM REV CODE 636 W HCPCS: Performed by: ORTHOPAEDIC SURGERY

## 2022-08-30 PROCEDURE — 97530 THERAPEUTIC ACTIVITIES: CPT

## 2022-08-30 PROCEDURE — 11000001 HC ACUTE MED/SURG PRIVATE ROOM

## 2022-08-30 PROCEDURE — 85025 COMPLETE CBC W/AUTO DIFF WBC: CPT | Performed by: INTERNAL MEDICINE

## 2022-08-30 PROCEDURE — 25000003 PHARM REV CODE 250: Performed by: INTERNAL MEDICINE

## 2022-08-30 RX ORDER — HYDROCODONE BITARTRATE AND ACETAMINOPHEN 5; 325 MG/1; MG/1
1 TABLET ORAL EVERY 4 HOURS PRN
Qty: 42 TABLET | Refills: 0 | Status: SHIPPED | OUTPATIENT
Start: 2022-08-30 | End: 2022-09-28 | Stop reason: SDUPTHER

## 2022-08-30 RX ADMIN — HYDROCODONE BITARTRATE AND ACETAMINOPHEN 1 TABLET: 5; 325 TABLET ORAL at 08:08

## 2022-08-30 RX ADMIN — AMANTADINE HYDROCHLORIDE 100 MG: 100 CAPSULE, LIQUID FILLED ORAL at 08:08

## 2022-08-30 RX ADMIN — HALOPERIDOL 5 MG: 5 TABLET ORAL at 08:08

## 2022-08-30 RX ADMIN — ENOXAPARIN SODIUM 40 MG: 40 INJECTION SUBCUTANEOUS at 09:08

## 2022-08-30 RX ADMIN — BENZTROPINE MESYLATE 1 MG: 1 TABLET ORAL at 09:08

## 2022-08-30 RX ADMIN — AMANTADINE HYDROCHLORIDE 100 MG: 100 CAPSULE, LIQUID FILLED ORAL at 09:08

## 2022-08-30 RX ADMIN — HYDROCODONE BITARTRATE AND ACETAMINOPHEN 1 TABLET: 5; 325 TABLET ORAL at 09:08

## 2022-08-30 NOTE — PT/OT/SLP PROGRESS
Physical Therapy Treatment    Patient Name:  Brandyn Ty   MRN:  [id number]    Recommendations:     Discharge Recommendations:  rehabilitation facility   Discharge Equipment Recommendations: walker, rolling   Barriers to discharge: Decreased caregiver support    Assessment:     Brandyn Ty is a 62 y.o. male admitted with a medical diagnosis of Displaced fracture of lateral condyle of left femur, initial encounter for open fracture type IIIA, IIIB, or IIIC.  He presents with the following impairments/functional limitations:  weakness, impaired endurance, impaired self care skills, impaired functional mobility, gait instability, impaired balance, decreased lower extremity function, pain, orthopedic precautions, decreased ROM. Pt is not safe to go home 2/2 requiring assistance to ambulate and navigate stairs, as well as low endurance.     Rehab Prognosis: Good; patient would benefit from acute skilled PT services to address these deficits and reach maximum level of function.    Recent Surgery: Procedure(s) (LRB):  arthrodesis  (Left) 2 Days Post-Op    Plan:     During this hospitalization, patient to be seen daily to BID to address the identified rehab impairments via gait training, therapeutic activities, therapeutic exercises, neuromuscular re-education and progress toward the following goals:    Plan of Care Expires:  09/29/22    Subjective     Chief Complaint: pain  Patient/Family Comments/goals: to get stronger  Pain/Comfort:  Pain Rating 1: 10/10  Location - Side 1: Left  Location 1: leg  Pain Addressed 1: Nurse notified      Objective:     Communicated with RN prior to session.  Patient found up in chair with peripheral IV, external fixator, chair alarm upon PT entry to room.     General Precautions: Standard,     Orthopedic Precautions:LLE non weight bearing   Braces: N/A  Respiratory Status: Room air     Functional Mobility:  Transfers:     Sit to Stand:  minimum assistance with rolling  "walker  Gait: pt demo'd a slow hop to gt pattern x 18 ft w/ use of RW and Sophie.   Stairs:  Pt ascended/descended 4" curb step with Rolling Walker with no handrails with Moderate Assistance.       AM-PAC 6 CLICK MOBILITY  Turning over in bed (including adjusting bedclothes, sheets and blankets)?: 3  Sitting down on and standing up from a chair with arms (e.g., wheelchair, bedside commode, etc.): 3  Moving from lying on back to sitting on the side of the bed?: 3  Moving to and from a bed to a chair (including a wheelchair)?: 3  Need to walk in hospital room?: 3  Climbing 3-5 steps with a railing?: 2  Basic Mobility Total Score: 17     Patient left up in chair with all lines intact, call button in reach, chair alarm on, and RN notified..    GOALS:   Multidisciplinary Problems       Physical Therapy Goals          Problem: Physical Therapy    Goal Priority Disciplines Outcome Goal Variances Interventions   Physical Therapy Goal     PT, PT/OT Ongoing, Progressing     Description: Goals to be met by: 2022     Patient will increase functional independence with mobility by performin. Supine to sit with Modified Slope  2. Sit to supine with Modified Slope  3. Sit to stand transfer with Mod I   4. Gait  x 200 feet with Modified Slope using Rolling Walker.                          Time Tracking:     PT Received On: 22  PT Start Time: 946     PT Stop Time: 1009  PT Total Time (min): 23 min     Billable Minutes: Therapeutic Activity 23 mins    Treatment Type: Treatment  PT/PTA: PT     PTA Visit Number: 0     2022  "

## 2022-08-30 NOTE — PROGRESS NOTES
"Inpatient Nutrition Evaluation    Admit Date: 8/24/2022   Nutrition Recommendation/Prescription     Continue regular diet as tolerated  RD to monitor po intake and weight changes    Nutrition Assessment     Chart Review    Reason Seen: length of stay    Diagnosis: Displaced fracture of lateral condyle of left femur, initial encounter for open fracture type IIIA, IIIB, or IIIC      Relevant Medical History: Schizophrenia, HTN     Nutrition-Related Medications: NaCl, zofran PRN    Nutrition-Related Labs: 8/30: WBC-13.2, RBC-3.25, H/H-9.6/30.9, glu-80      Diet Order: Diet Adult Regular  Oral Supplement Order: none at this time  Appetite/Oral Intake: good/% of meals  Factors Affecting Nutritional Intake: constipation  Food/Voodoo/Cultural Preferences: none reported    Skin Integrity: drain/device(s), incision  Wound(s):   n/a    Comments    8/30: pt reports good appetite, with constipation. UBW reported 185 lb with no weight loss.Admit weight of 83.9 kg (185 lb) on 8/24 and 8/25.    Anthropometrics    Height: 5' 10" (177.8 cm)    Last Weight: 83.9 kg (185 lb) (08/25/22 2015) Weight Method: Bed Scale  BMI (Calculated): 26.5  BMI Classification: overweight (BMI 25-29.9)        Ideal Body Weight (IBW), Male: 166 lb  % Ideal Body Weight, Male (lb): 111.45 %                   Wt Readings from Last 5 Encounters:   08/25/22 83.9 kg (185 lb)   02/06/20 86.6 kg (191 lb)   11/11/19 87.6 kg (193 lb 3.2 oz)   10/23/19 86 kg (189 lb 9.6 oz)   08/22/19 87.5 kg (193 lb)     Weight Change(s) Since Admission:  Admit Weight: 83.9 kg (185 lb) (08/24/22 2115)    Patient Education    Not applicable.    Monitoring & Evaluation     Dietitian will monitor food and beverage intake and weight change.  Nutrition Risk/Follow-Up: low (follow-up in 5-7 days)  Patients assigned 'low nutrition risk' status do not qualify for a full nutritional assessment but will be monitored and re-evaluated in a 5-7 day time period.    Tiana Jeffery, " Registration Eligible, Provisional LDN

## 2022-08-30 NOTE — PROGRESS NOTES
[facility] Medicine Progress Note        Chief Complaint: Inpatient Follow-up for consult for med mgmt    HPI:   63 yo AAM with hx Schizophrenia and HTN who presents to the ED after a ground level fall landing on his left knee. He is an extremely poor historian. He sustained a displaced open femur fracture grade 3A vs B.  that warranted immediate surgical intervention. Dr. Chatterjee was consulted and pt underwent I&D and external fixation. The Hospitalist Service was consulted for medical management.    Was also found to have hyponatremia that is improving.  Plan for open reduction and internal fixation revision knee fusion today.  Patient ripped his IV lines last night and also refusing lab work    August 28 it patient had left knee arthrodesis revision and left knee external fixator adjustment  Interval Hx:  Patient seen and examined reports he is doing better no other complaints reported by the nursing staff awaiting rehab placement      Objective/physical exam:  General: In no acute distress, afebrile  Chest: Clear to auscultation bilaterally  Heart: RRR, +S1, S2, no appreciable murmur  Abdomen: Soft, nontender, BS +  Neurologic: Alert and oriented   Left leg external fixation in place no obvious drainage    VITAL SIGNS: 24 HRS MIN & MAX LAST   Temp  Min: 97.8 °F (36.6 °C)  Max: 99 °F (37.2 °C) 97.8 °F (36.6 °C)   BP  Min: 110/63  Max: 150/88 (!) 150/88   Pulse  Min: 71  Max: 92  87   Resp  Min: 18  Max: 20 18   SpO2  Min: 97 %  Max: 100 % 100 %       Recent Labs   Lab 08/24/22  1827 08/27/22  0514 08/30/22  0656   WBC 16.4* 11.1 13.2*   RBC 4.65* 3.35* 3.25*   HGB 13.7* 10.0* 9.6*   HCT 39.6* 31.5* 30.9*   MCV 85.2 94.0 95.1*   MCH 29.5 29.9 29.5   MCHC 34.6 31.7* 31.1*   RDW 12.7 13.8 14.2    326 384   MPV 9.2 9.5 8.9       Recent Labs   Lab 08/24/22  1827 08/25/22  0425 08/28/22  0635 08/29/22  0554 08/30/22  0656   *   < > 134* 133* 136   K 4.0   < > 4.4 4.2  4.5   CO2 23   < > 24 24 25   BUN 9.4   < > 10.7 11.9 9.7   CREATININE 0.91   < > 0.78 0.81 0.81   CALCIUM 9.8   < > 9.3 9.0 9.1   ALBUMIN 4.2  --   --   --   --    ALKPHOS 71  --   --   --   --    ALT 19  --   --   --   --    AST 25  --   --   --   --    BILITOT 0.6  --   --   --   --     < > = values in this interval not displayed.          Microbiology Results (last 7 days)       ** No results found for the last 168 hours. **             See below for Radiology    Scheduled Med:   amantadine HCL  100 mg Oral BID    benztropine  1 mg Oral Daily    enoxaparin  40 mg Subcutaneous Daily    haloperidoL  5 mg Oral Daily before evening meal        Continuous Infusions:   sodium chloride 0.9% 50 mL/hr at 08/27/22 1010        PRN Meds:  hydrALAZINE, HYDROcodone-acetaminophen, HYDROmorphone, morphine, ondansetron, ondansetron, sodium chloride 0.9%, sodium chloride 0.9%, sodium chloride 0.9%       Assessment/Plan:   Hypovolemic Hyponatremia   Schizophrenia, unspecified  Essential HTN  Open Distal Left Femur Fracture s/p I&D and ORIF 8/24      Continue with PT and OT, case management is working on rehab placement   Sodium is improving this morning it is 136  Patient was started back on haldol sodium is improving this morning it is 136 and we have resume benztropine too  Patient is status post left knee arthrodesis revision on August 28 and left knee external fixator adjustment  Ortho wants the patient to be nonweightbearing for approximately 8 weeks followed by ex fix removal  Continue with PT and OT   Repeat blood work in a.m.     prophylaxis Lovenox    Patient condition:  Stable    Anticipated discharge and Disposition:  Rehab      All diagnosis and differential diagnosis have been reviewed; assessment and plan has been documented; I have personally reviewed the labs and test results that are presently available; I have reviewed the patients medication list; I have reviewed the consulting providers response and  recommendations. I have reviewed or attempted to review medical records based upon their availability    All of the patient's questions have been  addressed and answered. Patient's is agreeable to the above stated plan. I will continue to monitor closely and make adjustments to medical management as needed.  _____________________________________________________________________      Radiology:  SURG FL Surgery Fluoro Usage  See OP Notes for results.     IMPRESSION: See OP Notes for results.     This procedure was auto-finalized by: Virtual Radiologist      Alivia Darling MD   08/30/2022

## 2022-08-30 NOTE — PROGRESS NOTES
"[facility]  Psychiatry  Progress Note    Code Status: No Order  Admission Date: 8/24/2022  Hospital Length of Stay: 6 days  Attending Physician: Alivia Darling MD  Primary Care Provider: Elizabeth Marin MD    Current Legal Status: Uncontested      Subjective:     Patient is a 62 y.o., male, presents with:    Principal Problem:Displaced fracture of lateral condyle of left femur, initial encounter for open fracture type IIIA, IIIB, or IIIC    Chief Complaint: "I have been doing well; I just need to see if ya'll can help me with housing after I leave her."  Psych follow-up    HPI: No notes on file  Mr. Ty reports a good mood overall.  He says that he continues to be frustrated over the "box" being on his left leg.  He says that he cannot wait until it is removed where he may be able to do more.  He says that having the thing on his leg makes him feel sad at times.  He denies SI or HI.  He denies anxiety.  He denies AVH and delusional thinking.  He says that he has been sleeping well at night.  He saysthat he does have pain, but he is managing it.    Hospital Course: No notes on file  Psych Exam:  Alert and oriented to self and place.  Mood:  Depression  Affect:  Mood-congruent  Speech:  Normal rate and tone.  Good elaboration.  Thought Process:  No SI or HI. No AVH or delusional thinking.    Concentration:  Intact  Judgment:  fair  Insight:  good    Scheduled Medications:   amantadine HCL  100 mg Oral BID    benztropine  1 mg Oral Daily    enoxaparin  40 mg Subcutaneous Daily    haloperidoL  5 mg Oral Daily before evening meal       PRN Medications:   hydrALAZINE, HYDROcodone-acetaminophen, HYDROmorphone, morphine, ondansetron, ondansetron, sodium chloride 0.9%, sodium chloride 0.9%, sodium chloride 0.9%    Review of patient's allergies indicates:   Allergen Reactions    Chlorpromazine      Other reaction(s): Chlorpromazine, Unknown - See comments    Fluphenazine      Other " reaction(s): Fluphenazine, Unknown - See comments    Trazodone      Other reaction(s): Trazodone, Unknown - See comments       Assessment/Plan:     Schizophrenia, stable   Continue with benztropine and haloperidol.  He has been receiving these since 8/26/22.  Sodium level 133 today  Re-consult psych if needed.     Total time:  15 with greater than 50% of this time spent in counseling and/or coordination of care.       Jose Alberto Burroughs, SHIELA-C   Psychiatry  [facility]

## 2022-08-30 NOTE — DISCHARGE SUMMARY
Discharge Summary    Admit Date: 8/24/2022     Discharge Date: 8/30/2022     Operative Procedure: arthrodesis  (Left: Knee)     History of Present Illness/Hospital Course: Brandyn Ty is a 62 y.o. male presenting with fracture through left knee previous arthrodesis status post failure.  He was placed in external fixator and washed appropriately approximately 3 days ago.. The procedure is indicated to provide stability of the left knee allow healing of the arthrodesis site  The patient is awake and alert. After thorough discussion of the risks, benefits, expected outcomes, and alternatives to surgical intervention, the patient agreed to proceed with surgical treatment.  Specific risks discussed included, but were not limited to: superficial or deep infection, wound healing complications, DVT/PE, significant bleeding requiring transfusion, damage to named anatomic structures in the immediate area including named neurovascular structures, infection, nonunion, malunion and general risks of anesthesia.  The patient voiced understanding and written as well as verbal consent was obtained by myself prior to the procedure. He tolerated the procedure well and achieved adequate pain control by post op day 2. He was eager for discharge home.     Discharge Disposition: Home with home health and home physical therapy.    Activity: NWB to affected extremity ; ROMAT    Diet: Resume previous home diet    Medications:      Medication List        START taking these medications      HYDROcodone-acetaminophen 5-325 mg per tablet  Commonly known as: NORCO  Take 1 tablet by mouth every 4 (four) hours as needed for Pain.            CONTINUE taking these medications      amantadine  mg capsule  Commonly known as: SYMMETREL     benztropine 1 MG tablet  Commonly known as: COGENTIN     dicyclomine 20 mg tablet  Commonly known as: BENTYL     DULoxetine 30 MG capsule  Commonly known as: CYMBALTA     fluticasone propionate 50  mcg/actuation nasal spray  Commonly known as: FLONASE     haloperidoL 5 MG tablet  Commonly known as: HALDOL     naproxen 500 MG tablet  Commonly known as: NAPROSYN     simvastatin 20 MG tablet  Commonly known as: ZOCOR  Take 1 tablet (20 mg total) by mouth every evening.            STOP taking these medications      acetaminophen 500 MG tablet  Commonly known as: TYLENOL     amoxicillin-clavulanate 875-125mg 875-125 mg per tablet  Commonly known as: AUGMENTIN               Where to Get Your Medications        These medications were sent to [facility] [address] [place] [address] Floor 1  [address] [facility] [address]      Phone: [phone]   HYDROcodone-acetaminophen 5-325 mg per tablet          Discharge Instructions: Daily dry dressing changes until follow up. Keep incisions clean and dry. Do not apply ointments or creams.    Follow Up: Dr. Chatterjee in approx 3 weeks    The above findings, diagnostics, and treatment plan were discussed with Dr. Chatterjee who is in agreement with the plan of care except as stated in additional documentation.     Alexandra Blair Lemaire, PA-C [facility]   Orthopedic Trauma

## 2022-08-30 NOTE — PLAN OF CARE
Pt updated that [facility] acute rehab declined. Pt is agreeable for referral to be sent to [facility] rehab.  This done and Damian confirms he will submit in the am   Ashley form completed and sent.   FOC completed   Will follow up with [facility]

## 2022-08-30 NOTE — PROGRESS NOTES
[facility] Neuro  Orthopedics  Progress Note    Patient Name: Brandyn Ty  MRN: [id number]  Admission Date: 8/24/2022  Hospital Length of Stay: 6 days  Attending Provider: Shawn Chatterjee DO  Primary Care Provider: Elizabeth Marin MD  Follow-up For: Procedure(s) (LRB):  arthrodesis  (Left)    Post-Operative Day: 3 Day Post-Op  Subjective:     Principal Problem:Displaced fracture of lateral condyle of left femur, initial encounter for open fracture type IIIA, IIIB, or IIIC    Principal Orthopedic Problem: 2 Days Post-Op left revision knee arthrodesis    Interval History: Patient doing well following surgery. He states that his pain remains well controlled and that he is eager to go home at this time. States he worked well with therapy yesterday and is able to mobilize with weight bearing restrictions. Discussed discharge with Ex-fix in place and need for this to remain in for approx 2 months while we allow his fusion to heal. He is understandable of the plan.    Review of patient's allergies indicates:   Allergen Reactions    Chlorpromazine      Other reaction(s): Chlorpromazine, Unknown - See comments    Fluphenazine      Other reaction(s): Fluphenazine, Unknown - See comments    Trazodone      Other reaction(s): Trazodone, Unknown - See comments       Current Facility-Administered Medications   Medication    0.9%  NaCl infusion    amantadine HCL capsule/tablet 100 mg    benztropine tablet 1 mg    enoxaparin injection 40 mg    haloperidoL tablet 5 mg    hydrALAZINE injection 10 mg    HYDROcodone-acetaminophen 5-325 mg per tablet 1 tablet    HYDROmorphone (PF) injection 0.4 mg    morphine injection 4 mg    ondansetron injection 4 mg    ondansetron injection 4 mg    sodium chloride 0.9% flush 10 mL    sodium chloride 0.9% flush 10 mL    sodium chloride 0.9% flush 10 mL     Objective:     Vital Signs (Most Recent):  Temp: 98.9 °F (37.2 °C) (08/30/22 0354)  Pulse: 89 (08/30/22 0354)  Resp:  "18 (08/30/22 0354)  BP: 130/73 (08/30/22 0354)  SpO2: 99 % (08/30/22 0354) Vital Signs (24h Range):  Temp:  [98.2 °F (36.8 °C)-99 °F (37.2 °C)] 98.9 °F (37.2 °C)  Pulse:  [71-92] 89  Resp:  [18-20] 18  SpO2:  [97 %-100 %] 99 %  BP: (110-137)/(63-74) 130/73     Weight: 83.9 kg (185 lb)  Height: 5' 10" (177.8 cm)  Body mass index is 26.54 kg/m².      Intake/Output Summary (Last 24 hours) at 8/30/2022 0723  Last data filed at 8/30/2022 0325  Gross per 24 hour   Intake 600 ml   Output 2400 ml   Net -1800 ml         Physical Exam:   Musculoskeletal:     Left lower extremity: ex fix to the LLE remains in place.Pin sites are clean and dry without erythema today. Soft dressing in place to the left knee and lower leg without obvious signs of drainage; compartments are soft and compressible; No pain with ROM at the ankle or hip. No range of motion at the knee; appropriate tenderness to palpation; dorsi/plantar flexes the foot; SILT distally; BCR distally; DP pulse palpable      Diagnostic Findings:   Significant Labs:   Recent Lab Results         08/30/22  0656   08/29/22  0554   08/28/22  0635        Anion Gap   5.0   9.0       Baso # 0.08           Basophil % 0.6           BUN   11.9   10.7       BUN/CREAT RATIO   15   14       Calcium   9.0   9.3       Chloride   104   101       CO2   24   24       Creatinine   0.81   0.78       eGFR   >60   >60       Eos # 0.46           Eosinophil % 3.5           Glucose   112   81       Group & Rh     B POS       Hematocrit 30.9           Hemoglobin 9.6           Immature Grans (Abs) 0.06           Immature Granulocytes 0.5           Indirect Stephen GEL     NEG       Lymph # 2.08           LYMPH % 15.8           MCH 29.5           MCHC 31.1           MCV 95.1           Mono # 1.28           Mono % 9.7           MPV 8.9           Neut # 9.2           Neut % 69.9           nRBC 0.0           Platelets 384           Potassium   4.2   4.4       RBC 3.25           RDW 14.2           Sodium   " 133   134       WBC 13.2                    Significant Imaging: I have reviewed and interpreted all pertinent imaging results/findings.     Assessment/Plan:     Active Diagnoses:    Diagnosis Date Noted POA    PRINCIPAL PROBLEM:  Displaced fracture of lateral condyle of left femur, initial encounter for open fracture type IIIA, IIIB, or IIIC [S72.422C] 08/24/2022 Yes    Type III open displaced fracture of medial condyle of left tibia with routine healing [S82.132F] 08/24/2022 Not Applicable      Problems Resolved During this Admission:     H&H with decrease post op but stable  Remain NWB to the LLE. No range of motion to the left knee. Ex fix to remain in place x 8 weeks followed by removal and progressive weight bearing at that time.   Lovenox for DVT ppx during stay.   Plan for discharge home with home health today.  Dressing change to LLE prior to discharge and once daily as needed at home. Daily dry dressing with kerlex to pin sites with general pin site care by home health.  Please call with questions or concerns.   Follow up with Dr. Chatterjee in approx 3 weeks for wound check and repeat x-rays.    The above findings, diagnostics, and treatment plan were discussed with Dr. Chatterjee who is in agreement with the plan of care except as stated in additional documentation.      Obdulia Pereyra PA-C  Orthopedic Trauma Surgery  [facility]

## 2022-08-31 LAB
ANION GAP SERPL CALC-SCNC: 6 MEQ/L
BUN SERPL-MCNC: 9.1 MG/DL (ref 8.4–25.7)
CALCIUM SERPL-MCNC: 9.1 MG/DL (ref 8.8–10)
CHLORIDE SERPL-SCNC: 103 MMOL/L (ref 98–107)
CO2 SERPL-SCNC: 26 MMOL/L (ref 23–31)
CREAT SERPL-MCNC: 0.78 MG/DL (ref 0.73–1.18)
CREAT/UREA NIT SERPL: 12
GFR SERPLBLD CREATININE-BSD FMLA CKD-EPI: >60 MLS/MIN/1.73/M2
GLUCOSE SERPL-MCNC: 86 MG/DL (ref 82–115)
POTASSIUM SERPL-SCNC: 4.6 MMOL/L (ref 3.5–5.1)
SODIUM SERPL-SCNC: 135 MMOL/L (ref 136–145)

## 2022-08-31 PROCEDURE — 63600175 PHARM REV CODE 636 W HCPCS: Performed by: ORTHOPAEDIC SURGERY

## 2022-08-31 PROCEDURE — 11000001 HC ACUTE MED/SURG PRIVATE ROOM

## 2022-08-31 PROCEDURE — 97530 THERAPEUTIC ACTIVITIES: CPT

## 2022-08-31 PROCEDURE — 97535 SELF CARE MNGMENT TRAINING: CPT

## 2022-08-31 PROCEDURE — 25000003 PHARM REV CODE 250: Performed by: INTERNAL MEDICINE

## 2022-08-31 PROCEDURE — 94761 N-INVAS EAR/PLS OXIMETRY MLT: CPT

## 2022-08-31 PROCEDURE — 36415 COLL VENOUS BLD VENIPUNCTURE: CPT | Performed by: INTERNAL MEDICINE

## 2022-08-31 PROCEDURE — 25000003 PHARM REV CODE 250: Performed by: NURSE PRACTITIONER

## 2022-08-31 PROCEDURE — 80048 BASIC METABOLIC PNL TOTAL CA: CPT | Performed by: INTERNAL MEDICINE

## 2022-08-31 RX ORDER — ADHESIVE BANDAGE
30 BANDAGE TOPICAL DAILY PRN
Status: DISCONTINUED | OUTPATIENT
Start: 2022-08-31 | End: 2022-09-28 | Stop reason: HOSPADM

## 2022-08-31 RX ORDER — DOCUSATE SODIUM 100 MG/1
100 CAPSULE, LIQUID FILLED ORAL DAILY
Status: DISCONTINUED | OUTPATIENT
Start: 2022-08-31 | End: 2022-09-04

## 2022-08-31 RX ORDER — BISACODYL 10 MG
10 SUPPOSITORY, RECTAL RECTAL DAILY PRN
Status: DISCONTINUED | OUTPATIENT
Start: 2022-08-31 | End: 2022-09-28 | Stop reason: HOSPADM

## 2022-08-31 RX ORDER — POLYETHYLENE GLYCOL 3350 17 G/17G
17 POWDER, FOR SOLUTION ORAL DAILY
Status: DISCONTINUED | OUTPATIENT
Start: 2022-08-31 | End: 2022-09-04

## 2022-08-31 RX ADMIN — AMANTADINE HYDROCHLORIDE 100 MG: 100 CAPSULE, LIQUID FILLED ORAL at 09:08

## 2022-08-31 RX ADMIN — HYDROCODONE BITARTRATE AND ACETAMINOPHEN 1 TABLET: 5; 325 TABLET ORAL at 09:08

## 2022-08-31 RX ADMIN — DOCUSATE SODIUM 100 MG: 100 CAPSULE, LIQUID FILLED ORAL at 08:08

## 2022-08-31 RX ADMIN — AMANTADINE HYDROCHLORIDE 100 MG: 100 CAPSULE, LIQUID FILLED ORAL at 08:08

## 2022-08-31 RX ADMIN — HYDROCODONE BITARTRATE AND ACETAMINOPHEN 1 TABLET: 5; 325 TABLET ORAL at 08:08

## 2022-08-31 RX ADMIN — HYDROCODONE BITARTRATE AND ACETAMINOPHEN 1 TABLET: 5; 325 TABLET ORAL at 05:08

## 2022-08-31 RX ADMIN — BENZTROPINE MESYLATE 1 MG: 1 TABLET ORAL at 08:08

## 2022-08-31 RX ADMIN — HYDROCODONE BITARTRATE AND ACETAMINOPHEN 1 TABLET: 5; 325 TABLET ORAL at 12:08

## 2022-08-31 RX ADMIN — MAGNESIUM HYDROXIDE 2400 MG: 400 SUSPENSION ORAL at 08:08

## 2022-08-31 RX ADMIN — ENOXAPARIN SODIUM 40 MG: 40 INJECTION SUBCUTANEOUS at 08:08

## 2022-08-31 RX ADMIN — POLYETHYLENE GLYCOL 3350 17 G: 17 POWDER, FOR SOLUTION ORAL at 08:08

## 2022-08-31 NOTE — PT/OT/SLP PROGRESS
Occupational Therapy  Treatment    Brandyn Ty   MRN: [id number]   Admitting Diagnosis: Displaced fracture of lateral condyle of left femur, initial encounter for open fracture type IIIA, IIIB, or IIIC    OT Date of Treatment: 08/31/22   OT Start Time: 1059  OT Stop Time: 1115  OT Total Time (min): 16 min     Billable Minutes:  Self Care/Home Management 1 unit  Total Minutes: 16 minutes      OT/IVANNA: OT     IVANNA Visit Number: 1    General Precautions: Standard, fall  Orthopedic Precautions: LLE non weight bearing  Braces: N/A    Spiritual, Cultural Beliefs, Buddhism Practices, Values that Affect Care: no    Subjective:  Communicated with RN prior to session.    Pain/Comfort  Pain Rating 1: 10/10  Location - Side 1: Left  Location 1: leg  Pain Addressed 1: Reposition, Distraction    Objective:  Patient found with: external fixator    Functional Mobility:  Transfer Training:   Sit to stand:Contact Guard Assistance with Rolling Walker CGA for sit<>stand from chair  Toilet Transfer:  Pt hop to with Minimal Assistance with Rolling Walker Min A for sit<>stand from toilet using grab bar       Grooming:  Patient performed oral hygeine with Contact Guard Assistance at standing at sink.  Pt brushed teeth while standing at sink c RW and CGA for balance     Additional Treatment:    Patient left up in chair with call button in reach and chair alarm on    ASSESSMENT:  Rehab potential is good    Activity tolerance: Good    Discharge recommendations: rehabilitation facility     Equipment recommendations: walker, rolling, bedside commode     GOALS:   Multidisciplinary Problems       Occupational Therapy Goals          Problem: Occupational Therapy    Goal Priority Disciplines Outcome Interventions   Occupational Therapy Goal     OT, PT/OT Ongoing, Progressing    Description: Goals to be met by: 9/8    Patient will increase functional independence with ADLs by performing:    LE Dressing with Modified [place].  Grooming  while standing at sink with Modified [place].  Toileting from toilet with Modified [place] for hygiene and clothing management.   Toilet transfer to toilet with Modified [place].                         Plan:  Patient to be seen 5 x/week, daily to address the above listed problems via self-care/home management, therapeutic activities, therapeutic exercises  Plan of Care expires: 09/12/22  Plan of Care reviewed with: patient         08/31/2022

## 2022-08-31 NOTE — PT/OT/SLP PROGRESS
Physical Therapy Treatment    Patient Name:  Brandyn Ty   MRN:  [id number]    Recommendations:     Discharge Recommendations:  rehabilitation facility   Discharge Equipment Recommendations: walker, rolling   Barriers to discharge:  severity of deficits    Assessment:     Brandyn Ty is a 62 y.o. male admitted with a medical diagnosis of Displaced fracture of lateral condyle of left femur, initial encounter for open fracture type IIIA, IIIB, or IIIC.  He presents with the following impairments/functional limitations:  weakness, impaired endurance, impaired functional mobility, gait instability, impaired balance, decreased lower extremity function, pain, decreased ROM, decreased safety awareness, orthopedic precautions.    Rehab Prognosis: Good; patient would benefit from acute skilled PT services to address these deficits and reach maximum level of function.    Recent Surgery: Procedure(s) (LRB):  arthrodesis  (Left) 3 Days Post-Op    Plan:     During this hospitalization, patient to be seen daily to BID to address the identified rehab impairments via gait training, therapeutic activities, therapeutic exercises, neuromuscular re-education and progress toward the following goals:    Plan of Care Expires:  09/30/22    Subjective     Chief Complaint: pain  Patient/Family Comments/goals: to be independent  Pain/Comfort:  Pain Rating 1: 5/10  Location - Side 1: Left  Location 1: leg  Pain Addressed 1: Nurse notified  Pain Rating Post-Intervention 1: 0/10      Objective:     Communicated with RN prior to session.  Patient found HOB elevated with external fixator, peripheral IV upon PT entry to room.     General Precautions: Standard,     Orthopedic Precautions:LLE non weight bearing   Braces: N/A  Respiratory Status: Room air     Functional Mobility:  Bed Mobility:     Supine to Sit: minimum assistance  Transfers:     Sit to Stand:  minimum assistance with rolling walker  Toilet Transfer: minimum  assistance with  rolling walker  using  Stand Pivot  Gait: pt demo'd a slow hop to gt pattern x 100 ft, seated rest break for stair training, x additional 100 ft.   Stairs:  Pt ascended/descended 2 stair(s) with Rolling Walker with no handrails with Minimal Assistance. Lots of Vcs required for sequencing.       AM-PAC 6 CLICK MOBILITY  Turning over in bed (including adjusting bedclothes, sheets and blankets)?: 3  Sitting down on and standing up from a chair with arms (e.g., wheelchair, bedside commode, etc.): 3  Moving from lying on back to sitting on the side of the bed?: 3  Moving to and from a bed to a chair (including a wheelchair)?: 3  Need to walk in hospital room?: 3  Climbing 3-5 steps with a railing?: 3  Basic Mobility Total Score: 18       Patient left  on the commode in the restroom w/ CNA to assist pt  with all lines intact, call button in reach, and RN notified..    GOALS:   Multidisciplinary Problems       Physical Therapy Goals          Problem: Physical Therapy    Goal Priority Disciplines Outcome Goal Variances Interventions   Physical Therapy Goal     PT, PT/OT Ongoing, Progressing     Description: Goals to be met by: 2022     Patient will increase functional independence with mobility by performin. Supine to sit with Modified [place]  2. Sit to supine with Modified [place]  3. Sit to stand transfer with Mod I   4. Gait  x 200 feet with Modified [place] using Rolling Walker.                          Time Tracking:     PT Received On: 22  PT Start Time: 901     PT Stop Time: 928  PT Total Time (min): 27 min     Billable Minutes: Therapeutic Activity 27 mins    Treatment Type: Treatment  PT/PTA: PT     PTA Visit Number: 1     2022

## 2022-08-31 NOTE — PROGRESS NOTES
[facility] Medicine Progress Note        Chief Complaint: Inpatient Follow-up for consult for med mgmt    HPI:   61 yo AAM with hx Schizophrenia and HTN who presents to the ED after a ground level fall landing on his left knee. He is an extremely poor historian. He sustained a displaced open femur fracture grade 3A vs B.  that warranted immediate surgical intervention. Dr. Chatterjee was consulted and pt underwent I&D and external fixation. The Hospitalist Service was consulted for medical management.    Was also found to have hyponatremia that is improving.  Plan for open reduction and internal fixation revision knee fusion today.  Patient ripped his IV lines last night and also refusing lab work    August 28 it patient had left knee arthrodesis revision and left knee external fixator adjustment  Interval Hx:  Patient seen and examined was sitting comfortably in the chair no other complaints reported by the nursing staff awaiting rehab placement      Objective/physical exam:  General: In no acute distress, afebrile  Chest: Clear to auscultation bilaterally  Heart: RRR, +S1, S2, no appreciable murmur  Abdomen: Soft, nontender, BS +  Neurologic: Alert and oriented   Left leg external fixation in place no obvious drainage    VITAL SIGNS: 24 HRS MIN & MAX LAST   Temp  Min: 97.5 °F (36.4 °C)  Max: 98.9 °F (37.2 °C) 98.1 °F (36.7 °C)   BP  Min: 117/74  Max: 180/101 138/76   Pulse  Min: 83  Max: 103  103   Resp  Min: 10  Max: 20 19   SpO2  Min: 96 %  Max: 99 % 96 %       Recent Labs   Lab 08/24/22  1827 08/27/22  0514 08/30/22  0656   WBC 16.4* 11.1 13.2*   RBC 4.65* 3.35* 3.25*   HGB 13.7* 10.0* 9.6*   HCT 39.6* 31.5* 30.9*   MCV 85.2 94.0 95.1*   MCH 29.5 29.9 29.5   MCHC 34.6 31.7* 31.1*   RDW 12.7 13.8 14.2    326 384   MPV 9.2 9.5 8.9       Recent Labs   Lab 08/24/22  1827 08/25/22  0425 08/29/22  0554 08/30/22  0656 08/31/22  0340   *   < > 133* 136 135*   K 4.0   < >  4.2 4.5 4.6   CO2 23   < > 24 25 26   BUN 9.4   < > 11.9 9.7 9.1   CREATININE 0.91   < > 0.81 0.81 0.78   CALCIUM 9.8   < > 9.0 9.1 9.1   ALBUMIN 4.2  --   --   --   --    ALKPHOS 71  --   --   --   --    ALT 19  --   --   --   --    AST 25  --   --   --   --    BILITOT 0.6  --   --   --   --     < > = values in this interval not displayed.          Microbiology Results (last 7 days)       ** No results found for the last 168 hours. **             See below for Radiology    Scheduled Med:   amantadine HCL  100 mg Oral BID    benztropine  1 mg Oral Daily    docusate sodium  100 mg Oral Daily    enoxaparin  40 mg Subcutaneous Daily    haloperidoL  5 mg Oral Daily before evening meal    polyethylene glycol  17 g Oral Daily        Continuous Infusions:   sodium chloride 0.9% 50 mL/hr at 08/27/22 1010        PRN Meds:  bisacodyL, hydrALAZINE, HYDROcodone-acetaminophen, HYDROmorphone, magnesium hydroxide 400 mg/5 ml, morphine, ondansetron, ondansetron, sodium chloride 0.9%, sodium chloride 0.9%, sodium chloride 0.9%       Assessment/Plan:   Hypovolemic Hyponatremia   Schizophrenia, unspecified  Essential HTN  Open Distal Left Femur Fracture s/p I&D and ORIF 8/24      Continue with PT and OT, case management is working on rehab placement   Sodium is improving this morning it is 135  Patient was started back on haldol sodium is improving this morning it is 135 and we have resume benztropine too  Patient is status post left knee arthrodesis revision on August 28 and left knee external fixator adjustment  Ortho wants the patient to be nonweightbearing for approximately 8 weeks followed by ex fix removal  Continue with PT and OT   Repeat blood work in a.m.     prophylaxis Lovenox    Patient condition:  Stable    Anticipated discharge and Disposition:  Rehab      All diagnosis and differential diagnosis have been reviewed; assessment and plan has been documented; I have personally reviewed the labs and test results that are  presently available; I have reviewed the patients medication list; I have reviewed the consulting providers response and recommendations. I have reviewed or attempted to review medical records based upon their availability    All of the patient's questions have been  addressed and answered. Patient's is agreeable to the above stated plan. I will continue to monitor closely and make adjustments to medical management as needed.  _____________________________________________________________________      Radiology:  SURG FL Surgery Fluoro Usage  See OP Notes for results.     IMPRESSION: See OP Notes for results.     This procedure was auto-finalized by: Virtual Radiologist      Alivia Darling MD   08/31/2022

## 2022-08-31 NOTE — PROGRESS NOTES
"[facility] Neuro  Psychiatry  Progress Note    Code Status: No Order  Admission Date: 8/24/2022  Hospital Length of Stay: 6 days  Attending Physician: Alivia Darling MD  Primary Care Provider: Elizabeth Marin MD    Current Legal Status: Uncontested      Subjective:     Patient is a 62 y.o., male, presents with:    Principal Problem:Displaced fracture of lateral condyle of left femur, initial encounter for open fracture type IIIA, IIIB, or IIIC    Chief Complaint: "I am doing pretty good.    HPI: No notes on file    Mr. Ty is sitting up in his chair with both legs elevated.  He reports that he has been in better spirits.  He reports feelings of sadness due to pain but he state sthat he is thankful for how everyone has been helping him.  He denies SI or HI.  He denies anger.  He denies an elevated mood.  He reports a good appetite and states that he has been sleeping well.  He reports taking his meds daily.  He denies Sx of EPS or TD.    Hospital Course: No notes on file    Psych Exam:  Alert and oriented to self and place.  Mood:  +Depression, motivated for treatment  Affect:  Mood-congruent  Speech:  Normal rate and tone.  Good elaboration.  Thought Process:  No SI or HI. No AVH or delusional thinking.    Concentration:  Intact  Judgment:  fair  Insight:  good    Scheduled Medications:   amantadine HCL  100 mg Oral BID    benztropine  1 mg Oral Daily    enoxaparin  40 mg Subcutaneous Daily    haloperidoL  5 mg Oral Daily before evening meal       PRN Medications:   hydrALAZINE, HYDROcodone-acetaminophen, HYDROmorphone, morphine, ondansetron, ondansetron, sodium chloride 0.9%, sodium chloride 0.9%, sodium chloride 0.9%    Review of patient's allergies indicates:   Allergen Reactions    Chlorpromazine      Other reaction(s): Chlorpromazine, Unknown - See comments    Fluphenazine      Other reaction(s): Fluphenazine, Unknown - See comments    Trazodone      Other reaction(s): Trazodone, " Unknown - See comments       Assessment/Plan:      Schizophrenia, stable   Continue with benztropine and haloperidol.   Re-consult psych if needed. Psych is signing off.    Total time:  15 with greater than 50% of this time spent in counseling and/or coordination of care.       Jose Alberto Burroughs, MACIEP-C   Psychiatry  [facility] - Ortho Neuro

## 2022-09-01 PROCEDURE — 97530 THERAPEUTIC ACTIVITIES: CPT

## 2022-09-01 PROCEDURE — 11000001 HC ACUTE MED/SURG PRIVATE ROOM

## 2022-09-01 PROCEDURE — 25000003 PHARM REV CODE 250: Performed by: INTERNAL MEDICINE

## 2022-09-01 PROCEDURE — 63600175 PHARM REV CODE 636 W HCPCS: Performed by: ORTHOPAEDIC SURGERY

## 2022-09-01 PROCEDURE — 25000003 PHARM REV CODE 250: Performed by: NURSE PRACTITIONER

## 2022-09-01 RX ADMIN — ENOXAPARIN SODIUM 40 MG: 40 INJECTION SUBCUTANEOUS at 08:09

## 2022-09-01 RX ADMIN — HYDROCODONE BITARTRATE AND ACETAMINOPHEN 1 TABLET: 5; 325 TABLET ORAL at 09:09

## 2022-09-01 RX ADMIN — HYDROCODONE BITARTRATE AND ACETAMINOPHEN 1 TABLET: 5; 325 TABLET ORAL at 05:09

## 2022-09-01 RX ADMIN — HYDROCODONE BITARTRATE AND ACETAMINOPHEN 1 TABLET: 5; 325 TABLET ORAL at 01:09

## 2022-09-01 RX ADMIN — POLYETHYLENE GLYCOL 3350 17 G: 17 POWDER, FOR SOLUTION ORAL at 08:09

## 2022-09-01 RX ADMIN — HYDROCODONE BITARTRATE AND ACETAMINOPHEN 1 TABLET: 5; 325 TABLET ORAL at 04:09

## 2022-09-01 RX ADMIN — DOCUSATE SODIUM 100 MG: 100 CAPSULE, LIQUID FILLED ORAL at 08:09

## 2022-09-01 RX ADMIN — AMANTADINE HYDROCHLORIDE 100 MG: 100 CAPSULE, LIQUID FILLED ORAL at 08:09

## 2022-09-01 RX ADMIN — AMANTADINE HYDROCHLORIDE 100 MG: 100 CAPSULE, LIQUID FILLED ORAL at 09:09

## 2022-09-01 RX ADMIN — BENZTROPINE MESYLATE 1 MG: 1 TABLET ORAL at 08:09

## 2022-09-01 RX ADMIN — HYDROCODONE BITARTRATE AND ACETAMINOPHEN 1 TABLET: 5; 325 TABLET ORAL at 12:09

## 2022-09-01 NOTE — PLAN OF CARE
Problem: Adult Inpatient Plan of Care  Goal: Patient-Specific Goal (Individualized)  Outcome: Ongoing, Progressing  Flowsheets (Taken 9/1/2022 0018)  Patient-Specific Goals (Include Timeframe): to go home soon     Problem: Fall Injury Risk  Goal: Absence of Fall and Fall-Related Injury  Outcome: Ongoing, Progressing     Problem: Skin Injury Risk Increased  Goal: Skin Health and Integrity  Outcome: Ongoing, Progressing

## 2022-09-01 NOTE — PT/OT/SLP PROGRESS
Physical Therapy Treatment    Patient Name:  Brandyn Ty   MRN:  [id number]    Recommendations:     Discharge Recommendations:  rehabilitation facility   Discharge Equipment Recommendations: walker, rolling   Barriers to discharge:  acuity of illness    Assessment:     Brandyn Ty is a 62 y.o. male admitted with a medical diagnosis of Displaced fracture of lateral condyle of left femur, initial encounter for open fracture type IIIA, IIIB, or IIIC.  He presents with the following impairments/functional limitations:  weakness, impaired endurance, impaired functional mobility, gait instability, impaired balance, decreased lower extremity function, pain, decreased ROM, orthopedic precautions .    Rehab Prognosis: Good; patient would benefit from acute skilled PT services to address these deficits and reach maximum level of function.    Recent Surgery: Procedure(s) (LRB):  arthrodesis  (Left) 4 Days Post-Op    Plan:     During this hospitalization, patient to be seen daily to BID to address the identified rehab impairments via gait training, therapeutic activities, therapeutic exercises, neuromuscular re-education and progress toward the following goals:    Plan of Care Expires:  10/01/22    Subjective     Chief Complaint: pain  Patient/Family Comments/goals: to be independent  Pain/Comfort:  Pain Rating 1: 5/10  Location - Side 1: Left  Location 1: leg  Pain Addressed 1: Nurse notified      Objective:     Communicated with RN prior to session.  Patient found up in chair with external fixator, chair alarm upon PT entry to room.     General Precautions: Standard,     Orthopedic Precautions:LLE non weight bearing   Braces: N/A  Respiratory Status: Room air     Functional Mobility:  Bed Mobility:     Sit to Supine: contact guard assistance  Transfers:     Sit to Stand:  minimum assistance with rolling walker  Gait: pt demo'd a slow hop to gt pattern x 90 ft, seated rest, x 90 ft w/ use of RW and Sophie.        AM-PAC 6 CLICK MOBILITY  Turning over in bed (including adjusting bedclothes, sheets and blankets)?: 3  Sitting down on and standing up from a chair with arms (e.g., wheelchair, bedside commode, etc.): 3  Moving from lying on back to sitting on the side of the bed?: 3  Moving to and from a bed to a chair (including a wheelchair)?: 3  Need to walk in hospital room?: 3  Climbing 3-5 steps with a railing?: 3  Basic Mobility Total Score: 17       Patient left HOB elevated with all lines intact, call button in reach, bed alarm on, and RN notified..    GOALS:   Multidisciplinary Problems       Physical Therapy Goals          Problem: Physical Therapy    Goal Priority Disciplines Outcome Goal Variances Interventions   Physical Therapy Goal     PT, PT/OT Ongoing, Progressing     Description: Goals to be met by: 2022     Patient will increase functional independence with mobility by performin. Supine to sit with Modified [place]  2. Sit to supine with Modified [place]  3. Sit to stand transfer with Mod I   4. Gait  x 200 feet with Modified [place] using Rolling Walker.                          Time Tracking:     PT Received On: 22  PT Start Time: 1033     PT Stop Time: 1043  PT Total Time (min): 10 min     Billable Minutes: Therapeutic Activity 10 mins    Treatment Type: Treatment  PT/PTA: PT     PTA Visit Number: 3     2022

## 2022-09-01 NOTE — PT/OT/SLP PROGRESS
Physical Therapy Treatment    Patient Name:  Brandyn Ty   MRN:  [id number]    Recommendations:     Discharge Recommendations:  rehabilitation facility   Discharge Equipment Recommendations: walker, rolling   Barriers to discharge:  severity of deficits    Assessment:     Brandyn Ty is a 62 y.o. male admitted with a medical diagnosis of Displaced fracture of lateral condyle of left femur, initial encounter for open fracture type IIIA, IIIB, or IIIC.  He presents with the following impairments/functional limitations:  weakness, impaired endurance, impaired functional mobility, gait instability, impaired balance, decreased lower extremity function, pain, decreased safety awareness, decreased ROM, orthopedic precautions .    Rehab Prognosis: Good; patient would benefit from acute skilled PT services to address these deficits and reach maximum level of function.    Recent Surgery: Procedure(s) (LRB):  arthrodesis  (Left) 4 Days Post-Op    Plan:     During this hospitalization, patient to be seen daily to BID to address the identified rehab impairments via gait training, therapeutic activities, therapeutic exercises, neuromuscular re-education and progress toward the following goals:    Plan of Care Expires:  10/01/22    Subjective     Chief Complaint: pain  Patient/Family Comments/goals: to get stronger  Pain/Comfort:  Pain Rating 1: 5/10  Location - Side 1: Left  Location 1: leg  Pain Addressed 1: Nurse notified      Objective:     Communicated with RN prior to session.  Patient found HOB elevated with external fixator upon PT entry to room.     General Precautions: Standard,     Orthopedic Precautions:LLE non weight bearing   Braces:    Respiratory Status: Room air     Functional Mobility:  Bed Mobility:     Supine to Sit: stand by assistance  Transfers:     Sit to Stand:  minimum assistance with rolling walker  Gait: pt demo'd a slow hop to gt pattern x 100 ft, seated rest, x additional 100 ft  w/ use of RW      AM-PAC 6 CLICK MOBILITY  Turning over in bed (including adjusting bedclothes, sheets and blankets)?: 3  Sitting down on and standing up from a chair with arms (e.g., wheelchair, bedside commode, etc.): 3  Moving from lying on back to sitting on the side of the bed?: 3  Moving to and from a bed to a chair (including a wheelchair)?: 3  Need to walk in hospital room?: 3  Climbing 3-5 steps with a railing?: 2  Basic Mobility Total Score: 17     Patient left up in chair with all lines intact, call button in reach, chair alarm on, and RN notified..    GOALS:   Multidisciplinary Problems       Physical Therapy Goals          Problem: Physical Therapy    Goal Priority Disciplines Outcome Goal Variances Interventions   Physical Therapy Goal     PT, PT/OT Ongoing, Progressing     Description: Goals to be met by: 2022     Patient will increase functional independence with mobility by performin. Supine to sit with Modified [place]  2. Sit to supine with Modified [place]  3. Sit to stand transfer with Mod I   4. Gait  x 200 feet with Modified [place] using Rolling Walker.                          Time Tracking:     PT Received On: 22  PT Start Time: 1039     PT Stop Time: 1052  PT Total Time (min): 13 min     Billable Minutes: Therapeutic Activity 13 mins    Treatment Type: Treatment  PT/PTA: PT     PTA Visit Number: 3     2022

## 2022-09-01 NOTE — PROGRESS NOTES
[facility]  Hospital Medicine Progress Note        Chief Complaint: Inpatient Follow-up for consult for med mgmt    HPI:   61 yo AAM with hx Schizophrenia and HTN who presents to the ED after a ground level fall landing on his left knee. He is an extremely poor historian. He sustained a displaced open femur fracture grade 3A vs B.  that warranted immediate surgical intervention. Dr. Chatterjee was consulted and pt underwent I&D and external fixation. The Hospitalist Service was consulted for medical management.    Was also found to have hyponatremia that is improving.  Plan for open reduction and internal fixation revision knee fusion today.  Patient ripped his IV lines last night and also refusing lab work    August 28 it patient had left knee arthrodesis revision and left knee external fixator adjustment  Interval Hx:  Patient seen and examined this morning just woke up reports he is doing good no issues reported    Objective/physical exam:  General: In no acute distress, afebrile  Chest: Clear to auscultation bilaterally  Heart: RRR, +S1, S2, no appreciable murmur  Abdomen: Soft, nontender, BS +  Neurologic: Alert and oriented   Left leg external fixation in place no obvious drainage    VITAL SIGNS: 24 HRS MIN & MAX LAST   Temp  Min: 97.9 °F (36.6 °C)  Max: 98.7 °F (37.1 °C) 98.6 °F (37 °C)   BP  Min: 128/80  Max: 142/80 135/77   Pulse  Min: 72  Max: 103  72   Resp  Min: 16  Max: 19 18   SpO2  Min: 96 %  Max: 99 % 96 %       Recent Labs   Lab 08/27/22  0514 08/30/22  0656   WBC 11.1 13.2*   RBC 3.35* 3.25*   HGB 10.0* 9.6*   HCT 31.5* 30.9*   MCV 94.0 95.1*   MCH 29.9 29.5   MCHC 31.7* 31.1*   RDW 13.8 14.2    384   MPV 9.5 8.9       Recent Labs   Lab 08/29/22  0554 08/30/22  0656 08/31/22  0340   * 136 135*   K 4.2 4.5 4.6   CO2 24 25 26   BUN 11.9 9.7 9.1   CREATININE 0.81 0.81 0.78   CALCIUM 9.0 9.1 9.1          Microbiology Results (last 7 days)       ** No results found  for the last 168 hours. **             See below for Radiology    Scheduled Med:   amantadine HCL  100 mg Oral BID    benztropine  1 mg Oral Daily    docusate sodium  100 mg Oral Daily    enoxaparin  40 mg Subcutaneous Daily    haloperidoL  5 mg Oral Daily before evening meal    polyethylene glycol  17 g Oral Daily        Continuous Infusions:   sodium chloride 0.9% 50 mL/hr at 08/27/22 1010        PRN Meds:  bisacodyL, hydrALAZINE, HYDROcodone-acetaminophen, HYDROmorphone, magnesium hydroxide 400 mg/5 ml, morphine, ondansetron, ondansetron, sodium chloride 0.9%, sodium chloride 0.9%, sodium chloride 0.9%       Assessment/Plan:   Hypovolemic Hyponatremia   Schizophrenia, unspecified  Essential HTN  Open Distal Left Femur Fracture s/p I&D and ORIF 8/24      Continue with PT and OT, case management is working on rehab placement   Sodium is stable  Patient was started back on haldol and benztropine   Patient is status post left knee arthrodesis revision on August 28 and left knee external fixator adjustment  Ortho wants the patient to be nonweightbearing for approximately 8 weeks followed by ex fix removal  Continue with PT and OT   Repeat blood work in a.m.     prophylaxis Lovenox    Patient condition:  Stable    Anticipated discharge and Disposition:  Rehab      All diagnosis and differential diagnosis have been reviewed; assessment and plan has been documented; I have personally reviewed the labs and test results that are presently available; I have reviewed the patients medication list; I have reviewed the consulting providers response and recommendations. I have reviewed or attempted to review medical records based upon their availability    All of the patient's questions have been  addressed and answered. Patient's is agreeable to the above stated plan. I will continue to monitor closely and make adjustments to medical management as  needed.  _____________________________________________________________________      Radiology:  SURG FL Surgery Fluoro Usage  See OP Notes for results.     IMPRESSION: See OP Notes for results.     This procedure was auto-finalized by: Virtual Radiologist      Alivia Darling MD   09/01/2022

## 2022-09-02 LAB
ANION GAP SERPL CALC-SCNC: 6 MEQ/L
BASOPHILS # BLD AUTO: 0.05 X10(3)/MCL (ref 0–0.2)
BASOPHILS NFR BLD AUTO: 0.5 %
BUN SERPL-MCNC: 9.3 MG/DL (ref 8.4–25.7)
CALCIUM SERPL-MCNC: 8.8 MG/DL (ref 8.8–10)
CHLORIDE SERPL-SCNC: 103 MMOL/L (ref 98–107)
CO2 SERPL-SCNC: 27 MMOL/L (ref 23–31)
CREAT SERPL-MCNC: 0.77 MG/DL (ref 0.73–1.18)
CREAT/UREA NIT SERPL: 12
EOSINOPHIL # BLD AUTO: 0.53 X10(3)/MCL (ref 0–0.9)
EOSINOPHIL NFR BLD AUTO: 4.8 %
ERYTHROCYTE [DISTWIDTH] IN BLOOD BY AUTOMATED COUNT: 14.3 % (ref 11.5–17)
GFR SERPLBLD CREATININE-BSD FMLA CKD-EPI: >60 MLS/MIN/1.73/M2
GLUCOSE SERPL-MCNC: 80 MG/DL (ref 82–115)
HCT VFR BLD AUTO: 30.2 % (ref 42–52)
HGB BLD-MCNC: 9.2 GM/DL (ref 14–18)
IMM GRANULOCYTES # BLD AUTO: 0.08 X10(3)/MCL (ref 0–0.04)
IMM GRANULOCYTES NFR BLD AUTO: 0.7 %
LYMPHOCYTES # BLD AUTO: 1.45 X10(3)/MCL (ref 0.6–4.6)
LYMPHOCYTES NFR BLD AUTO: 13.1 %
MCH RBC QN AUTO: 29.3 PG (ref 27–31)
MCHC RBC AUTO-ENTMCNC: 30.5 MG/DL (ref 33–36)
MCV RBC AUTO: 96.2 FL (ref 80–94)
MONOCYTES # BLD AUTO: 1.11 X10(3)/MCL (ref 0.1–1.3)
MONOCYTES NFR BLD AUTO: 10 %
NEUTROPHILS # BLD AUTO: 7.8 X10(3)/MCL (ref 2.1–9.2)
NEUTROPHILS NFR BLD AUTO: 70.9 %
NRBC BLD AUTO-RTO: 0 %
PLATELET # BLD AUTO: 400 X10(3)/MCL (ref 130–400)
PMV BLD AUTO: 9 FL (ref 7.4–10.4)
POTASSIUM SERPL-SCNC: 4.9 MMOL/L (ref 3.5–5.1)
RBC # BLD AUTO: 3.14 X10(6)/MCL (ref 4.7–6.1)
SODIUM SERPL-SCNC: 136 MMOL/L (ref 136–145)
WBC # SPEC AUTO: 11.1 X10(3)/MCL (ref 4.5–11.5)

## 2022-09-02 PROCEDURE — 97116 GAIT TRAINING THERAPY: CPT | Mod: CQ

## 2022-09-02 PROCEDURE — 25000003 PHARM REV CODE 250: Performed by: NURSE PRACTITIONER

## 2022-09-02 PROCEDURE — 11000001 HC ACUTE MED/SURG PRIVATE ROOM

## 2022-09-02 PROCEDURE — 80048 BASIC METABOLIC PNL TOTAL CA: CPT | Performed by: INTERNAL MEDICINE

## 2022-09-02 PROCEDURE — 97535 SELF CARE MNGMENT TRAINING: CPT | Mod: CO

## 2022-09-02 PROCEDURE — 85025 COMPLETE CBC W/AUTO DIFF WBC: CPT | Performed by: INTERNAL MEDICINE

## 2022-09-02 PROCEDURE — 63600175 PHARM REV CODE 636 W HCPCS: Performed by: ORTHOPAEDIC SURGERY

## 2022-09-02 PROCEDURE — 25000003 PHARM REV CODE 250: Performed by: INTERNAL MEDICINE

## 2022-09-02 PROCEDURE — 25000003 PHARM REV CODE 250

## 2022-09-02 PROCEDURE — 36415 COLL VENOUS BLD VENIPUNCTURE: CPT | Performed by: INTERNAL MEDICINE

## 2022-09-02 RX ADMIN — POLYETHYLENE GLYCOL 3350 17 G: 17 POWDER, FOR SOLUTION ORAL at 08:09

## 2022-09-02 RX ADMIN — HALOPERIDOL 5 MG: 5 TABLET ORAL at 08:09

## 2022-09-02 RX ADMIN — ENOXAPARIN SODIUM 40 MG: 40 INJECTION SUBCUTANEOUS at 08:09

## 2022-09-02 RX ADMIN — HYDROCODONE BITARTRATE AND ACETAMINOPHEN 1 TABLET: 5; 325 TABLET ORAL at 01:09

## 2022-09-02 RX ADMIN — AMANTADINE HYDROCHLORIDE 100 MG: 100 CAPSULE, LIQUID FILLED ORAL at 08:09

## 2022-09-02 RX ADMIN — DOCUSATE SODIUM 100 MG: 100 CAPSULE, LIQUID FILLED ORAL at 08:09

## 2022-09-02 RX ADMIN — HYDROCODONE BITARTRATE AND ACETAMINOPHEN 1 TABLET: 5; 325 TABLET ORAL at 08:09

## 2022-09-02 RX ADMIN — BENZTROPINE MESYLATE 1 MG: 1 TABLET ORAL at 08:09

## 2022-09-02 RX ADMIN — HYDROCODONE BITARTRATE AND ACETAMINOPHEN 1 TABLET: 5; 325 TABLET ORAL at 05:09

## 2022-09-02 RX ADMIN — HYDROCODONE BITARTRATE AND ACETAMINOPHEN 1 TABLET: 5; 325 TABLET ORAL at 12:09

## 2022-09-02 NOTE — PT/OT/SLP PROGRESS
Physical Therapy         Treatment        Brandyn Ty   MRN: [id number]        PT Start Time: 0850     PT Stop Time: 0901    PT Total Time (min): 11 min       Billable Minutes:  Gait [id number]  Total Minutes: 11    Treatment Type: Treatment  PT/PTA: PTA     PTA Visit Number: 4       General Precautions: Standard,    Orthopedic Precautions: Orthopedic Precautions : LLE non weight bearing   Braces: Braces:  (ex-fix)    Spiritual, Cultural Beliefs, Baptist Practices, Values that Affect Care: no    Subjective:  Communicated with nurse prior to session.    Pain/Comfort  Pain Rating 1: 9/10  Location - Side 1: Left  Location 1: leg  Pain Addressed 1: Reposition, Distraction, Pre-medicate for activity    Objective:  Patient found laying in bed upon arrival, with  call before fall educated    Functional Mobility:  Bed Mobility:   Supine to sit: Standby Assistance- HOB elevated   Sit to supine: Activity did not occur   Rolling: Activity did not occur   Scooting: Standby Assistance    Balance:   Static Sit: FAIR+: Able to take MINIMAL challenges from all directions  Dynamic Sit:  0: N/A  Static Stand: GOOD: Takes MODERATE challenges from all directions  Dynamic stand: 0: N/A    Transfer Training:  Sit to stand:Contact Guard Assistance with Rolling Walker sit to stand from EOB, pt able to maintain NWB LLE    Gait Training:  Patient gait trained NWB: left lower extremity 120  feet on level tile with Rolling Walker with Contact Guard Assistance.  Pt with demonstarting a  swing-to gait with decreased pj and decreased step length.Impairments contributing to gait deviations include decreased ROM and with decreased endurance noted, chair in tow    Activity Tolerance:  Patient tolerated treatment well    Patient left up in chair with call button in reach.    Assessment:  Brandyn Ty is a 62 y.o. male with a medical diagnosis of Displaced fracture of lateral condyle of left femur, initial encounter for  open fracture type IIIA, IIIB, or IIIC. RW ordered again for pt's room, call before fall and chair alarm activated upon conclusion    Rehab potential is good.    Activity tolerance: Good and Fair    Discharge recommendations: Discharge Facility/Level of Care Needs: rehabilitation facility, other (see comments) (vs swing bed)     Equipment recommendations:       GOALS:   Multidisciplinary Problems       Physical Therapy Goals          Problem: Physical Therapy    Goal Priority Disciplines Outcome Goal Variances Interventions   Physical Therapy Goal     PT, PT/OT Ongoing, Progressing     Description: Goals to be met by: 2022     Patient will increase functional independence with mobility by performin. Supine to sit with Modified [place]  2. Sit to supine with Modified [place]  3. Sit to stand transfer with Mod I   4. Gait  x 200 feet with Modified [place] using Rolling Walker.                          PLAN:    Patient to be seen daily  to address the above listed problems via gait training, therapeutic activities  Plan of Care expires: 10/01/22  Plan of Care reviewed with: patient         2022

## 2022-09-02 NOTE — PLAN OF CARE
Problem: Adult Inpatient Plan of Care  Goal: Patient-Specific Goal (Individualized)  Outcome: Ongoing, Progressing     Problem: Fall Injury Risk  Goal: Absence of Fall and Fall-Related Injury  Outcome: Ongoing, Progressing     Problem: Skin Injury Risk Increased  Goal: Skin Health and Integrity  Outcome: Ongoing, Progressing

## 2022-09-02 NOTE — PROGRESS NOTES
[facility]  Hospital Medicine Progress Note        Chief Complaint: Inpatient Follow-up for consult for med mgmt    HPI:   61 yo AAM with hx Schizophrenia and HTN who presents to the ED after a ground level fall landing on his left knee. He is an extremely poor historian. He sustained a displaced open femur fracture grade 3A vs B.  that warranted immediate surgical intervention. Dr. Chatterjee was consulted and pt underwent I&D and external fixation. The Hospitalist Service was consulted for medical management.    Was also found to have hyponatremia that is improving.  Plan for open reduction and internal fixation revision knee fusion today.  Patient ripped his IV lines last night and also refusing lab work    August 28 it patient had left knee arthrodesis revision and left knee external fixator adjustment  Interval Hx:  Patient seen and examined this morning no complaints      Objective/physical exam:  General: In no acute distress, afebrile  Chest: Clear to auscultation bilaterally  Heart: RRR, +S1, S2, no appreciable murmur  Abdomen: Soft, nontender, BS +  Neurologic: Alert and oriented   Left leg external fixation in place no obvious drainage    VITAL SIGNS: 24 HRS MIN & MAX LAST   Temp  Min: 98.1 °F (36.7 °C)  Max: 98.4 °F (36.9 °C) 98.1 °F (36.7 °C)   BP  Min: 109/68  Max: 175/66 132/72   Pulse  Min: 74  Max: 96  76   Resp  Min: 14  Max: 20 17   SpO2  Min: 98 %  Max: 99 % 99 %       Recent Labs   Lab 08/27/22  0514 08/30/22  0656 09/02/22  0622   WBC 11.1 13.2* 11.1   RBC 3.35* 3.25* 3.14*   HGB 10.0* 9.6* 9.2*   HCT 31.5* 30.9* 30.2*   MCV 94.0 95.1* 96.2*   MCH 29.9 29.5 29.3   MCHC 31.7* 31.1* 30.5*   RDW 13.8 14.2 14.3    384 400   MPV 9.5 8.9 9.0       Recent Labs   Lab 08/30/22  0656 08/31/22  0340 09/02/22  0622    135* 136   K 4.5 4.6 4.9   CO2 25 26 27   BUN 9.7 9.1 9.3   CREATININE 0.81 0.78 0.77   CALCIUM 9.1 9.1 8.8          Microbiology Results (last 7 days)        ** No results found for the last 168 hours. **             See below for Radiology    Scheduled Med:   amantadine HCL  100 mg Oral BID    benztropine  1 mg Oral Daily    docusate sodium  100 mg Oral Daily    enoxaparin  40 mg Subcutaneous Daily    haloperidoL  5 mg Oral Daily before evening meal    polyethylene glycol  17 g Oral Daily        Continuous Infusions:   sodium chloride 0.9% 50 mL/hr at 08/27/22 1010        PRN Meds:  bisacodyL, hydrALAZINE, HYDROcodone-acetaminophen, HYDROmorphone, magnesium hydroxide 400 mg/5 ml, morphine, ondansetron, ondansetron, sodium chloride 0.9%, sodium chloride 0.9%, sodium chloride 0.9%       Assessment/Plan:   Hypovolemic Hyponatremia   Schizophrenia, unspecified  Essential HTN  Open Distal Left Femur Fracture s/p I&D and ORIF 8/24      Continue with PT and OT, case management is working on rehab placement   Sodium is stable  Patient was started back on haldol and benztropine   Patient is status post left knee arthrodesis revision on August 28 and left knee external fixator adjustment  Ortho wants the patient to be nonweightbearing for approximately 8 weeks followed by ex fix removal  Continue with PT and OT   Repeat blood work in a.m.     prophylaxis Lovenox    Patient condition:  Stable    Anticipated discharge and Disposition:  Rehab      All diagnosis and differential diagnosis have been reviewed; assessment and plan has been documented; I have personally reviewed the labs and test results that are presently available; I have reviewed the patients medication list; I have reviewed the consulting providers response and recommendations. I have reviewed or attempted to review medical records based upon their availability    All of the patient's questions have been  addressed and answered. Patient's is agreeable to the above stated plan. I will continue to monitor closely and make adjustments to medical management as  needed.  _____________________________________________________________________      Radiology:  SURG FL Surgery Fluoro Usage  See OP Notes for results.     IMPRESSION: See OP Notes for results.     This procedure was auto-finalized by: Virtual Radiologist      Alivia Darling MD   09/02/2022

## 2022-09-02 NOTE — PLAN OF CARE
Updates sent to [facility] who submitted to insurer a few days ago.   Kaleigh has not received a response yet.  From insurer [employer]

## 2022-09-02 NOTE — PT/OT/SLP PROGRESS
Occupational Therapy  Treatment    Brandyn Ty   MRN: [id number]   Admitting Diagnosis: Displaced fracture of lateral condyle of left femur, initial encounter for open fracture type IIIA, IIIB, or IIIC    OT Date of Treatment: 09/02/22   OT Start Time: 1028  OT Stop Time: 1040  OT Total Time (min): 12 min     Billable Minutes:  Self Care/Home Management 12  Total Minutes: 12     OT/IVANNA: IVANNA     IVANNA Visit Number: 2    General Precautions: Standard, fall  Orthopedic Precautions: LLE non weight bearing  Braces:      Spiritual, Cultural Beliefs, Faith Practices, Values that Affect Care: no    Subjective:  Appropriate and agreeable to OT session.          Objective:  Patient found with: external fixator    Functional Mobility:  Bed Mobility:   Supine to sit: Activity did not occur   Sit to supine: Activity did not occur   Rolling: Activity did not occur   Scooting: Activity did not occur    Grooming:  Oral hygiene while standing at sink with SBA.     Toilet Training:  Void in standing with SBA.     Balance:   Static Sit: GOOD-: Takes MODERATE challenges from all directions but inconsistently  Dynamic Sit:  GOOD-: Incosistently Maintains balance through MODERATE excursions of active trunk movement,     Static Stand: GOOD-: Takes MODERATE challenges from all directions inconsistently  Dynamic stand: FAIR+: Needs CLOSE SUPERVISION during gait and is able to right self with minor LOB    Additional Treatment:      Patient left up in chair with all lines intact, call button in reach, and chair alarm    ASSESSMENT:  Brandyn Ty is a 62 y.o. male with a medical diagnosis of Displaced fracture of lateral condyle of left femur, initial encounter for open fracture type IIIA, IIIB, or IIIC. Pt progressing well. Decreased caregiver assistance at home and poor safety awareness.     Rehab potential is good    Activity tolerance: Good    Discharge recommendations: rehabilitation facility     Equipment  recommendations:       GOALS:   Multidisciplinary Problems       Occupational Therapy Goals          Problem: Occupational Therapy    Goal Priority Disciplines Outcome Interventions   Occupational Therapy Goal     OT, PT/OT Ongoing, Progressing    Description: Goals to be met by: 9/8    Patient will increase functional independence with ADLs by performing:    LE Dressing with Modified [place].  Grooming while standing at sink with Modified [place].  Toileting from toilet with Modified [place] for hygiene and clothing management.   Toilet transfer to toilet with Modified [place].                         Plan:  Patient to be seen 5 x/week, daily to address the above listed problems via self-care/home management, therapeutic exercises, therapeutic activities  Plan of Care expires: 09/12/22  Plan of Care reviewed with: patient         09/02/2022

## 2022-09-03 PROCEDURE — 25000003 PHARM REV CODE 250: Performed by: INTERNAL MEDICINE

## 2022-09-03 PROCEDURE — 97530 THERAPEUTIC ACTIVITIES: CPT | Mod: CQ

## 2022-09-03 PROCEDURE — 25000003 PHARM REV CODE 250

## 2022-09-03 PROCEDURE — 25000003 PHARM REV CODE 250: Performed by: NURSE PRACTITIONER

## 2022-09-03 PROCEDURE — 97116 GAIT TRAINING THERAPY: CPT | Mod: CQ

## 2022-09-03 PROCEDURE — 11000001 HC ACUTE MED/SURG PRIVATE ROOM

## 2022-09-03 PROCEDURE — 63600175 PHARM REV CODE 636 W HCPCS: Performed by: ORTHOPAEDIC SURGERY

## 2022-09-03 RX ORDER — HYDROCORTISONE 25 MG/ML
LOTION TOPICAL 3 TIMES DAILY
Status: DISCONTINUED | OUTPATIENT
Start: 2022-09-03 | End: 2022-09-03

## 2022-09-03 RX ORDER — ACETAMINOPHEN 325 MG/1
650 TABLET ORAL EVERY 6 HOURS PRN
Status: DISCONTINUED | OUTPATIENT
Start: 2022-09-03 | End: 2022-09-28 | Stop reason: HOSPADM

## 2022-09-03 RX ORDER — HYDROCORTISONE 1 %
CREAM (GRAM) TOPICAL 3 TIMES DAILY
Status: DISCONTINUED | OUTPATIENT
Start: 2022-09-03 | End: 2022-09-09

## 2022-09-03 RX ADMIN — POLYETHYLENE GLYCOL 3350 17 G: 17 POWDER, FOR SOLUTION ORAL at 09:09

## 2022-09-03 RX ADMIN — ENOXAPARIN SODIUM 40 MG: 40 INJECTION SUBCUTANEOUS at 09:09

## 2022-09-03 RX ADMIN — HYDROCORTISONE: 10 CREAM TOPICAL at 08:09

## 2022-09-03 RX ADMIN — DOCUSATE SODIUM 100 MG: 100 CAPSULE, LIQUID FILLED ORAL at 09:09

## 2022-09-03 RX ADMIN — AMANTADINE HYDROCHLORIDE 100 MG: 100 CAPSULE, LIQUID FILLED ORAL at 09:09

## 2022-09-03 RX ADMIN — HYDROCODONE BITARTRATE AND ACETAMINOPHEN 1 TABLET: 5; 325 TABLET ORAL at 10:09

## 2022-09-03 RX ADMIN — ACETAMINOPHEN 650 MG: 325 TABLET ORAL at 10:09

## 2022-09-03 RX ADMIN — BENZTROPINE MESYLATE 1 MG: 1 TABLET ORAL at 09:09

## 2022-09-03 RX ADMIN — HALOPERIDOL 5 MG: 5 TABLET ORAL at 04:09

## 2022-09-03 RX ADMIN — HYDROCODONE BITARTRATE AND ACETAMINOPHEN 1 TABLET: 5; 325 TABLET ORAL at 08:09

## 2022-09-03 NOTE — PT/OT/SLP PROGRESS
Physical Therapy Treatment    Patient Name:  Brandyn Ty   MRN:  [id number]    Recommendations:     Discharge Recommendations:  rehabilitation facility, other (see comments) (vs swing bed)   Discharge Equipment Recommendations: walker, rolling     Subjective     Patient awake and alert.     Objective:     General Precautions: Standard,     Orthopedic Precautions:LLE non weight bearing   Braces:    Respiratory Status: Room air     Communicated with nurse prior to treatment.     Functional Mobility:    Rolling:Supervision or Set-up Assistance  Supine to sit:Supervision or Set-up Assistance  Sit to stand transfer: Stand-by Assistance  Bed to chair transfer:Stand-by Assistance  Gait 145 ft with RW min assist and NWB LLE. Additional gait to BR and pt has BM. Assistance with hygiene.       AM-PAC 6 CLICK MOBILITY  Turning over in bed (including adjusting bedclothes, sheets and blankets)?: 3  Sitting down on and standing up from a chair with arms (e.g., wheelchair, bedside commode, etc.): 3  Moving from lying on back to sitting on the side of the bed?: 3  Moving to and from a bed to a chair (including a wheelchair)?: 3  Need to walk in hospital room?: 3  Climbing 3-5 steps with a railing?: 3  Basic Mobility Total Score: 18     Patient left up in chair with chair alarm on..    GOALS:   Multidisciplinary Problems       Physical Therapy Goals          Problem: Physical Therapy    Goal Priority Disciplines Outcome Goal Variances Interventions   Physical Therapy Goal     PT, PT/OT Ongoing, Progressing     Description: Goals to be met by: 2022     Patient will increase functional independence with mobility by performin. Supine to sit with Modified [place]  2. Sit to supine with Modified [place]  3. Sit to stand transfer with Mod I   4. Gait  x 200 feet with Modified [place] using Rolling Walker.                          Assessment:     Brandyn Ty is a 62 y.o. male admitted with a  medical diagnosis of Displaced fracture of lateral condyle of left femur, initial encounter for open fracture type IIIA, IIIB, or IIIC.     Rehab Prognosis: Good; patient would benefit from acute skilled PT services to address these deficits and reach maximum level of function.    Recent Surgery: Procedure(s) (LRB):  arthrodesis  (Left) 6 Days Post-Op    Plan:     During this hospitalization, patient to be seen daily to address the identified rehab impairments via gait training, therapeutic activities and progress toward the following goals:    Plan of Care Expires:  10/01/22    Billable Minutes     Billable Minutes: Gait Training 12 and Therapeutic Activity 13    Treatment Type: Treatment  PT/PTA: PTA     PTA Visit Number: 5 09/03/2022

## 2022-09-03 NOTE — PROGRESS NOTES
[facility] Medicine Progress Note        Chief Complaint: Inpatient Follow-up for consult for med mgmt     HPI:   63 yo AAM with hx Schizophrenia and HTN who presents to the ED after a ground level fall landing on his left knee. He is an extremely poor historian. He sustained a displaced open femur fracture grade 3A vs B.  that warranted immediate surgical intervention. Dr. Chatterjee was consulted and pt underwent I&D and external fixation. The Hospitalist Service was consulted for medical management.    Was also found to have hyponatremia that is improving.  Plan for open reduction and internal fixation revision knee fusion today.  Patient ripped his IV lines last night and also refusing lab work     August 28 it patient had left knee arthrodesis revision and left knee external fixator adjustment        Interval Hx:  Patient seen and examined this morning no complaints  - wants coffee, provided  - nurse reports back rash, itchy   - add on hydrocortisone cream      Objective/physical exam:  General: In no acute distress, afebrile  Chest: Clear to auscultation bilaterally  Heart: RRR, +S1, S2, no appreciable murmur  Abdomen: Soft, nontender, BS +  Neurologic: Alert and oriented   Left leg external fixation in place no obvious drainage    Assessment/Plan:   Rash- itchy  Hypovolemic Hyponatremia   Schizophrenia, unspecified  Essential HTN  Open Distal Left Femur Fracture s/p I&D and ORIF 8/24      plan  Add hydrocortisone cream  Continue with PT and OT, case management is working on rehab placement   Sodium is stable  Patient was started back on haldol and benztropine   Patient is status post left knee arthrodesis revision on August 28 and left knee external fixator adjustment  Ortho wants the patient to be nonweightbearing for approximately 8 weeks followed by ex fix removal  Continue with PT and OT   Repeat blood work in a.m.      prophylaxis Lovenox     Patient condition:  Stable      Anticipated discharge and Disposition:  Rehab           VITAL SIGNS: 24 HRS MIN & MAX LAST   Temp  Min: 97.5 °F (36.4 °C)  Max: 100.1 °F (37.8 °C) 98.3 °F (36.8 °C)   BP  Min: 109/67  Max: 168/93 109/67   Pulse  Min: 84  Max: 112  98   Resp  Min: 16  Max: 20 18   SpO2  Min: 95 %  Max: 99 % 95 %       Recent Labs   Lab 08/30/22  0656 09/02/22  0622   WBC 13.2* 11.1   RBC 3.25* 3.14*   HGB 9.6* 9.2*   HCT 30.9* 30.2*   MCV 95.1* 96.2*   MCH 29.5 29.3   MCHC 31.1* 30.5*   RDW 14.2 14.3    400   MPV 8.9 9.0       Recent Labs   Lab 08/30/22  0656 08/31/22  0340 09/02/22  0622    135* 136   K 4.5 4.6 4.9   CO2 25 26 27   BUN 9.7 9.1 9.3   CREATININE 0.81 0.78 0.77   CALCIUM 9.1 9.1 8.8          Microbiology Results (last 7 days)       ** No results found for the last 168 hours. **             See below for Radiology    Scheduled Med:   amantadine HCL  100 mg Oral BID    benztropine  1 mg Oral Daily    docusate sodium  100 mg Oral Daily    enoxaparin  40 mg Subcutaneous Daily    haloperidoL  5 mg Oral Daily before evening meal    polyethylene glycol  17 g Oral Daily        Continuous Infusions:       PRN Meds:  bisacodyL, hydrALAZINE, HYDROcodone-acetaminophen, HYDROmorphone, magnesium hydroxide 400 mg/5 ml, morphine, ondansetron, ondansetron, sodium chloride 0.9%, sodium chloride 0.9%, sodium chloride 0.9%         VTE prophylaxis:     Patient condition:  Stable/Fair/Guarded/ Serious/ Critical    Anticipated discharge and Disposition:         All diagnosis and differential diagnosis have been reviewed; assessment and plan has been documented; I have personally reviewed the labs and test results that are presently available; I have reviewed the patients medication list; I have reviewed the consulting providers response and recommendations. I have reviewed or attempted to review medical records based upon their availability    All of the patient's questions have been  addressed and answered. Patient's is  agreeable to the above stated plan. I will continue to monitor closely and make adjustments to medical management as needed.  _____________________________________________________________________    Nutrition Status:    Radiology:  SURG FL Surgery Fluoro Usage  See OP Notes for results.     IMPRESSION: See OP Notes for results.     This procedure was auto-finalized by: Virtual Radiologist      Nereida Perdomo MD   09/03/2022

## 2022-09-03 NOTE — PLAN OF CARE
Problem: Fall Injury Risk  Goal: Absence of Fall and Fall-Related Injury  Outcome: Ongoing, Progressing     Problem: Skin Injury Risk Increased  Goal: Skin Health and Integrity  Outcome: Ongoing, Progressing     Problem: Fall Injury Risk  Goal: Absence of Fall and Fall-Related Injury  9/3/2022 0513 by Vikki Gant RN  Outcome: Ongoing, Progressing  9/3/2022 0512 by Vikki Gant RN  Outcome: Ongoing, Progressing     Problem: Skin Injury Risk Increased  Goal: Skin Health and Integrity  9/3/2022 0513 by Vikki Gant RN  Outcome: Ongoing, Progressing  9/3/2022 0512 by Vikki Gant RN  Outcome: Ongoing, Progressing     Problem: Pain Acute  Goal: Acceptable Pain Control and Functional Ability  9/3/2022 0513 by Vikki Gant RN  Outcome: Ongoing, Progressing  9/3/2022 0512 by Vikki Gant RN  Outcome: Ongoing, Progressing     Problem: Adult Inpatient Plan of Care  Goal: Plan of Care Review  Outcome: Ongoing, Progressing  Goal: Patient-Specific Goal (Individualized)  Outcome: Ongoing, Progressing  Goal: Absence of Hospital-Acquired Illness or Injury  Outcome: Ongoing, Progressing  Goal: Optimal Comfort and Wellbeing  Outcome: Ongoing, Progressing  Goal: Readiness for Transition of Care  Outcome: Ongoing, Progressing

## 2022-09-04 PROCEDURE — 25000003 PHARM REV CODE 250: Performed by: INTERNAL MEDICINE

## 2022-09-04 PROCEDURE — 25000003 PHARM REV CODE 250

## 2022-09-04 PROCEDURE — 63600175 PHARM REV CODE 636 W HCPCS: Performed by: PHYSICIAN ASSISTANT

## 2022-09-04 PROCEDURE — 25000003 PHARM REV CODE 250: Performed by: PHYSICIAN ASSISTANT

## 2022-09-04 PROCEDURE — 11000001 HC ACUTE MED/SURG PRIVATE ROOM

## 2022-09-04 PROCEDURE — 25000003 PHARM REV CODE 250: Performed by: NURSE PRACTITIONER

## 2022-09-04 RX ORDER — BUTALBITAL, ACETAMINOPHEN AND CAFFEINE 50; 325; 40 MG/1; MG/1; MG/1
1 TABLET ORAL EVERY 4 HOURS PRN
Status: DISCONTINUED | OUTPATIENT
Start: 2022-09-04 | End: 2022-09-28 | Stop reason: HOSPADM

## 2022-09-04 RX ORDER — SULFAMETHOXAZOLE AND TRIMETHOPRIM 800; 160 MG/1; MG/1
1 TABLET ORAL 2 TIMES DAILY
Status: DISCONTINUED | OUTPATIENT
Start: 2022-09-04 | End: 2022-09-04

## 2022-09-04 RX ORDER — BENZTROPINE MESYLATE 1 MG/1
2 TABLET ORAL DAILY
Status: DISCONTINUED | OUTPATIENT
Start: 2022-09-05 | End: 2022-09-09

## 2022-09-04 RX ORDER — LOPERAMIDE HYDROCHLORIDE 2 MG/1
2 CAPSULE ORAL EVERY 4 HOURS PRN
Status: DISCONTINUED | OUTPATIENT
Start: 2022-09-04 | End: 2022-09-07

## 2022-09-04 RX ADMIN — HYDROCORTISONE: 10 CREAM TOPICAL at 08:09

## 2022-09-04 RX ADMIN — HALOPERIDOL 5 MG: 5 TABLET ORAL at 05:09

## 2022-09-04 RX ADMIN — LOPERAMIDE HYDROCHLORIDE 2 MG: 2 CAPSULE ORAL at 03:09

## 2022-09-04 RX ADMIN — HYDROCODONE BITARTRATE AND ACETAMINOPHEN 1 TABLET: 5; 325 TABLET ORAL at 08:09

## 2022-09-04 RX ADMIN — LOPERAMIDE HYDROCHLORIDE 2 MG: 2 CAPSULE ORAL at 08:09

## 2022-09-04 RX ADMIN — AMANTADINE HYDROCHLORIDE 100 MG: 100 CAPSULE, LIQUID FILLED ORAL at 08:09

## 2022-09-04 RX ADMIN — HYDROCODONE BITARTRATE AND ACETAMINOPHEN 1 TABLET: 5; 325 TABLET ORAL at 03:09

## 2022-09-04 RX ADMIN — PIPERACILLIN SODIUM AND TAZOBACTAM SODIUM 4.5 G: 4; .5 INJECTION, POWDER, LYOPHILIZED, FOR SOLUTION INTRAVENOUS at 05:09

## 2022-09-04 RX ADMIN — BENZTROPINE MESYLATE 1 MG: 1 TABLET ORAL at 08:09

## 2022-09-04 RX ADMIN — LOPERAMIDE HYDROCHLORIDE 2 MG: 2 CAPSULE ORAL at 11:09

## 2022-09-04 NOTE — PROGRESS NOTES
[facility] Neuro  Orthopedics  Progress Note    Patient Name: Brandyn Ty  MRN: [id number]  Admission Date: 8/24/2022  Hospital Length of Stay: 11 days  Attending Provider: Alivia Darling MD  Primary Care Provider: Elizabeth Marin MD  Follow-up For: Procedure(s) (LRB):  arthrodesis  (Left)    Post-Operative Day: 7 Day Post-Op  Subjective:     Principal Problem:Displaced fracture of lateral condyle of left femur, initial encounter for open fracture type IIIA, IIIB, or IIIC    Principal Orthopedic Problem: 7 Days Post-Op left revision knee arthrodesis    Interval History: Patient doing well following surgery. Nursing staff asked me to evaluate him today as he has some redness and yellow drainage from his proximal pin site. States he did have a rash over the back and down the leg which is resolving. States the redness over the proximal thigh is different than his previous rash. Endorses some tenderness to palpation over the thigh.    Review of patient's allergies indicates:   Allergen Reactions    Chlorpromazine      Other reaction(s): Chlorpromazine, Unknown - See comments    Fluphenazine      Other reaction(s): Fluphenazine, Unknown - See comments    Trazodone      Other reaction(s): Trazodone, Unknown - See comments       Current Facility-Administered Medications   Medication    acetaminophen tablet 650 mg    amantadine HCL capsule/tablet 100 mg    [START ON 9/5/2022] benztropine tablet 2 mg    bisacodyL suppository 10 mg    butalbital-acetaminophen-caffeine -40 mg per tablet 1 tablet    enoxaparin injection 40 mg    haloperidoL tablet 5 mg    hydrALAZINE injection 10 mg    HYDROcodone-acetaminophen 5-325 mg per tablet 1 tablet    hydrocortisone 1 % cream    HYDROmorphone (PF) injection 0.4 mg    loperamide capsule 2 mg    magnesium hydroxide 400 mg/5 ml suspension 2,400 mg    morphine injection 4 mg    ondansetron injection 4 mg    ondansetron injection 4 mg     "piperacillin-tazobactam (ZOSYN) 4.5 g in dextrose 5 % in water (D5W) 5 % 100 mL IVPB (MB+)    sodium chloride 0.9% flush 10 mL    sodium chloride 0.9% flush 10 mL    sodium chloride 0.9% flush 10 mL     Objective:     Vital Signs (Most Recent):  Temp: 97.6 °F (36.4 °C) (09/04/22 1536)  Pulse: 102 (09/04/22 1536)  Resp: 19 (09/04/22 1536)  BP: 129/75 (09/04/22 1536)  SpO2: 97 % (09/04/22 1536) Vital Signs (24h Range):  Temp:  [97.4 °F (36.3 °C)-98.5 °F (36.9 °C)] 97.6 °F (36.4 °C)  Pulse:  [] 102  Resp:  [16-19] 19  SpO2:  [96 %-100 %] 97 %  BP: (125-143)/(72-82) 129/75     Weight: 83.9 kg (185 lb)  Height: 5' 10" (177.8 cm)  Body mass index is 26.54 kg/m².      Intake/Output Summary (Last 24 hours) at 9/4/2022 1638  Last data filed at 9/4/2022 1500  Gross per 24 hour   Intake 1680 ml   Output 4700 ml   Net -3020 ml         Physical Exam:   Musculoskeletal:     Left lower extremity: ex fix to the LLE remains in place.Proximal pin site with mild serous drainage, no expressible purulence Soft dressing in place to the left knee and lower leg without obvious signs of drainage; mild erythema surrounding the proximal thigh and over the proximal ex fix attachment site; compartments are soft and compressible; No pain with ROM at the ankle or hip. No range of motion at the knee; moderate tenderness to palpation; dorsi/plantar flexes the foot; SILT distally; BCR distally; DP pulse palpable      Diagnostic Findings:   Significant Labs:   Recent Lab Results         09/02/22  0622        Anion Gap 6.0       Baso # 0.05       Basophil % 0.5       BUN 9.3       BUN/CREAT RATIO 12       Calcium 8.8       Chloride 103       CO2 27       Creatinine 0.77       eGFR >60       Eos # 0.53       Eosinophil % 4.8       Glucose 80       Hematocrit 30.2       Hemoglobin 9.2       Immature Grans (Abs) 0.08       Immature Granulocytes 0.7       Lymph # 1.45       LYMPH % 13.1       MCH 29.3       MCHC 30.5       MCV 96.2       Mono # " 1.11       Mono % 10.0       MPV 9.0       Neut # 7.8       Neut % 70.9       nRBC 0.0       Platelets 400       Potassium 4.9       RBC 3.14       RDW 14.3       Sodium 136       WBC 11.1                Significant Imaging: I have reviewed and interpreted all pertinent imaging results/findings.     Assessment/Plan:     Active Diagnoses:    Diagnosis Date Noted POA    PRINCIPAL PROBLEM:  Displaced fracture of lateral condyle of left femur, initial encounter for open fracture type IIIA, IIIB, or IIIC [S72.422C] 08/24/2022 Yes    Type III open displaced fracture of medial condyle of left tibia with routine healing [S82.132F] 08/24/2022 Not Applicable      Problems Resolved During this Admission:     Start zosyn for possible pin site infection. Will discuss with Dr. Samayoa and Dr. Chatterjee for appropriate intervention or removal of ex-fix as needed if no improvement on IV abx.  Remain NWB to the LLE. No range of motion to the left knee. Ex fix to remain in place x 8 weeks followed by removal and progressive weight bearing at that time.   Lovenox for DVT ppx during stay.   Will continue to monitor over the next 24 hours      The above findings, diagnostics, and treatment plan were discussed with Dr. Samayoa who is in agreement with the plan of care except as stated in additional documentation.      Obdulia Pereyra PA-C  Orthopedic Trauma Surgery  [facility]

## 2022-09-04 NOTE — PROGRESS NOTES
[facility] Hospital Medicine Progress Note      Chief Complaint: Inpatient Follow-up for consult for med mgmt     HPI:   63 yo AAM with hx Schizophrenia and HTN who presents to the ED after a ground level fall landing on his left knee. He is an extremely poor historian. He sustained a displaced open femur fracture grade 3A vs B.  that warranted immediate surgical intervention. Dr. Chatterjee was consulted and pt underwent I&D and external fixation. The Hospitalist Service was consulted for medical management.    Was also found to have hyponatremia that is improving.  Plan for open reduction and internal fixation revision knee fusion today.  Patient ripped his IV lines last night and also refusing lab work     August 28 it patient had left knee arthrodesis revision and left knee external fixator adjustment           Interval Hx:  Patient seen and examined this morning no complaints  Having diarrhea- begin imodium, d/c laxatives   C/o headaches- add on fioricet prn     Objective/physical exam:  General: In no acute distress, afebrile  Chest: Clear to auscultation bilaterally  Heart: RRR, +S1, S2, no appreciable murmur  Abdomen: Soft, nontender, BS +  Neurologic: Alert and oriented   Left leg external fixation in place no obvious drainage     Assessment/Plan:   Acute watery diarrhea  Headaches  Rash- itchy, improving   Hypovolemic Hyponatremia   Schizophrenia, unspecified  Essential HTN  Open Distal Left Femur Fracture s/p I&D and ORIF 8/24      plan  begin imodium, d/c laxatives   add on fioricet prn  Add hydrocortisone cream  Continue with PT and OT, case management is working on rehab placement  Patient was started back on haldol and benztropine   Patient is status post left knee arthrodesis revision on August 28 and left knee external fixator adjustment  Ortho wants the patient to be nonweightbearing for approximately 8 weeks followed by ex fix removal  Continue with PT and OT       prophylaxis Lovenox     Patient condition:  Stable     Anticipated discharge and Disposition:  Rehab        VITAL SIGNS: 24 HRS MIN & MAX LAST   Temp  Min: 97.4 °F (36.3 °C)  Max: 98.5 °F (36.9 °C) 98.5 °F (36.9 °C)   BP  Min: 125/73  Max: 143/82 125/73   Pulse  Min: 86  Max: 97  97   Resp  Min: 16  Max: 19 19   SpO2  Min: 96 %  Max: 100 % 98 %       Recent Labs   Lab 08/30/22  0656 09/02/22  0622   WBC 13.2* 11.1   RBC 3.25* 3.14*   HGB 9.6* 9.2*   HCT 30.9* 30.2*   MCV 95.1* 96.2*   MCH 29.5 29.3   MCHC 31.1* 30.5*   RDW 14.2 14.3    400   MPV 8.9 9.0       Recent Labs   Lab 08/30/22  0656 08/31/22  0340 09/02/22 0622    135* 136   K 4.5 4.6 4.9   CO2 25 26 27   BUN 9.7 9.1 9.3   CREATININE 0.81 0.78 0.77   CALCIUM 9.1 9.1 8.8          Microbiology Results (last 7 days)       ** No results found for the last 168 hours. **             See below for Radiology    Scheduled Med:   amantadine HCL  100 mg Oral BID    benztropine  1 mg Oral Daily    docusate sodium  100 mg Oral Daily    enoxaparin  40 mg Subcutaneous Daily    haloperidoL  5 mg Oral Daily before evening meal    hydrocortisone   Topical (Top) TID    polyethylene glycol  17 g Oral Daily        Continuous Infusions:       PRN Meds:  acetaminophen, bisacodyL, hydrALAZINE, HYDROcodone-acetaminophen, HYDROmorphone, loperamide, magnesium hydroxide 400 mg/5 ml, morphine, ondansetron, ondansetron, sodium chloride 0.9%, sodium chloride 0.9%, sodium chloride 0.9%       VTE prophylaxis:     Patient condition:  Stable/Fair/Guarded/ Serious/ Critical    Anticipated discharge and Disposition:         All diagnosis and differential diagnosis have been reviewed; assessment and plan has been documented; I have personally reviewed the labs and test results that are presently available; I have reviewed the patients medication list; I have reviewed the consulting providers response and recommendations. I have reviewed or attempted to review medical records  based upon their availability    All of the patient's questions have been  addressed and answered. Patient's is agreeable to the above stated plan. I will continue to monitor closely and make adjustments to medical management as needed.  _____________________________________________________________________    Nutrition Status:    Radiology:  SURG FL Surgery Fluoro Usage  See OP Notes for results.     IMPRESSION: See OP Notes for results.     This procedure was auto-finalized by: Virtual Radiologist      Nereida Perdomo MD   09/04/2022

## 2022-09-04 NOTE — NURSING
"1900-came on shift, prev RN reports that pt had developed a rash on his back and also had multiple loose stools throughout the day. For rash, Dr. Perdomo ordered Hydrocortisone cream 2.5% but pharmacy only has 1%.    1905-Notified Celine Liu NP. For BMs she said: "before we give him something, let's just hold his daily colace.  We'll see how he does throughout the night.". And for the Hydrocortisone cream she gave the ok to change the order from 2.5% to 1%. See orders.  "

## 2022-09-04 NOTE — PLAN OF CARE
Problem: Fall Injury Risk  Goal: Absence of Fall and Fall-Related Injury  Outcome: Ongoing, Progressing     Problem: Skin Injury Risk Increased  Goal: Skin Health and Integrity  Outcome: Ongoing, Progressing     Problem: Pain Acute  Goal: Acceptable Pain Control and Functional Ability  Outcome: Ongoing, Progressing     Problem: Adult Inpatient Plan of Care  Goal: Plan of Care Review  Outcome: Ongoing, Progressing  Goal: Patient-Specific Goal (Individualized)  Outcome: Ongoing, Progressing  Goal: Absence of Hospital-Acquired Illness or Injury  Outcome: Ongoing, Progressing  Goal: Optimal Comfort and Wellbeing  Outcome: Ongoing, Progressing  Goal: Readiness for Transition of Care  Outcome: Ongoing, Progressing

## 2022-09-05 PROCEDURE — 25000003 PHARM REV CODE 250: Performed by: INTERNAL MEDICINE

## 2022-09-05 PROCEDURE — 63600175 PHARM REV CODE 636 W HCPCS: Performed by: ORTHOPAEDIC SURGERY

## 2022-09-05 PROCEDURE — 63600175 PHARM REV CODE 636 W HCPCS: Performed by: PHYSICIAN ASSISTANT

## 2022-09-05 PROCEDURE — 25000003 PHARM REV CODE 250

## 2022-09-05 PROCEDURE — 25000003 PHARM REV CODE 250: Performed by: PHYSICIAN ASSISTANT

## 2022-09-05 PROCEDURE — 97164 PT RE-EVAL EST PLAN CARE: CPT

## 2022-09-05 PROCEDURE — 25000003 PHARM REV CODE 250: Performed by: NURSE PRACTITIONER

## 2022-09-05 PROCEDURE — 11000001 HC ACUTE MED/SURG PRIVATE ROOM

## 2022-09-05 RX ORDER — LACTOBACILLUS ACIDOPHILUS 500MM CELL
1 CAPSULE ORAL
Status: DISCONTINUED | OUTPATIENT
Start: 2022-09-05 | End: 2022-09-09

## 2022-09-05 RX ADMIN — HYDROCODONE BITARTRATE AND ACETAMINOPHEN 1 TABLET: 5; 325 TABLET ORAL at 04:09

## 2022-09-05 RX ADMIN — PIPERACILLIN SODIUM AND TAZOBACTAM SODIUM 4.5 G: 4; .5 INJECTION, POWDER, LYOPHILIZED, FOR SOLUTION INTRAVENOUS at 05:09

## 2022-09-05 RX ADMIN — LOPERAMIDE HYDROCHLORIDE 2 MG: 2 CAPSULE ORAL at 12:09

## 2022-09-05 RX ADMIN — HYDROCODONE BITARTRATE AND ACETAMINOPHEN 1 TABLET: 5; 325 TABLET ORAL at 09:09

## 2022-09-05 RX ADMIN — HYDROCORTISONE: 10 CREAM TOPICAL at 09:09

## 2022-09-05 RX ADMIN — HYDROCORTISONE: 10 CREAM TOPICAL at 04:09

## 2022-09-05 RX ADMIN — Medication 1 CAPSULE: at 11:09

## 2022-09-05 RX ADMIN — PIPERACILLIN SODIUM AND TAZOBACTAM SODIUM 4.5 G: 4; .5 INJECTION, POWDER, LYOPHILIZED, FOR SOLUTION INTRAVENOUS at 11:09

## 2022-09-05 RX ADMIN — HALOPERIDOL 5 MG: 5 TABLET ORAL at 05:09

## 2022-09-05 RX ADMIN — Medication 1 CAPSULE: at 05:09

## 2022-09-05 RX ADMIN — BENZTROPINE MESYLATE 2 MG: 1 TABLET ORAL at 09:09

## 2022-09-05 RX ADMIN — HYDROCORTISONE: 10 CREAM TOPICAL at 08:09

## 2022-09-05 RX ADMIN — PIPERACILLIN SODIUM AND TAZOBACTAM SODIUM 4.5 G: 4; .5 INJECTION, POWDER, LYOPHILIZED, FOR SOLUTION INTRAVENOUS at 12:09

## 2022-09-05 NOTE — PROGRESS NOTES
[facility]  Orthopedics  Progress Note    Patient Name: Brandyn Ty  MRN: [id number]  Admission Date: 8/24/2022  Hospital Length of Stay: 12 days  Attending Provider: Alivia Darling MD  Primary Care Provider: Elizabeth Marin MD  Follow-up For: Procedure(s) (LRB):  arthrodesis  (Left)    Post-Operative Day: 8 Day Post-Op  Subjective:     Principal Problem:Displaced fracture of lateral condyle of left femur, initial encounter for open fracture type IIIA, IIIB, or IIIC    Principal Orthopedic Problem: 8 Days Post-Op left revision knee arthrodesis    Interval History: Patient doing well this morning.  Sitting up out of bed in a chair.  Had no acute issues overnight.  Resting comfortably.      Review of patient's allergies indicates:   Allergen Reactions    Chlorpromazine      Other reaction(s): Chlorpromazine, Unknown - See comments    Fluphenazine      Other reaction(s): Fluphenazine, Unknown - See comments    Trazodone      Other reaction(s): Trazodone, Unknown - See comments       Current Facility-Administered Medications   Medication    acetaminophen tablet 650 mg    amantadine HCL capsule/tablet 100 mg    benztropine tablet 2 mg    bisacodyL suppository 10 mg    butalbital-acetaminophen-caffeine -40 mg per tablet 1 tablet    enoxaparin injection 40 mg    haloperidoL tablet 5 mg    hydrALAZINE injection 10 mg    HYDROcodone-acetaminophen 5-325 mg per tablet 1 tablet    hydrocortisone 1 % cream    HYDROmorphone (PF) injection 0.4 mg    loperamide capsule 2 mg    magnesium hydroxide 400 mg/5 ml suspension 2,400 mg    morphine injection 4 mg    ondansetron injection 4 mg    ondansetron injection 4 mg    piperacillin-tazobactam (ZOSYN) 4.5 g in dextrose 5 % in water (D5W) 5 % 100 mL IVPB (MB+)    sodium chloride 0.9% flush 10 mL    sodium chloride 0.9% flush 10 mL    sodium chloride 0.9% flush 10 mL     Objective:     Vital Signs (Most Recent):  Temp: 98.7 °F (37.1 °C)  "(09/05/22 0400)  Pulse: 99 (09/05/22 0400)  Resp: 18 (09/05/22 0400)  BP: 103/65 (09/05/22 0400)  SpO2: 98 % (09/05/22 0400) Vital Signs (24h Range):  Temp:  [97.6 °F (36.4 °C)-98.8 °F (37.1 °C)] 98.7 °F (37.1 °C)  Pulse:  [] 99  Resp:  [17-19] 18  SpO2:  [97 %-100 %] 98 %  BP: (103-143)/(65-82) 103/65     Weight: 83.9 kg (185 lb)  Height: 5' 10" (177.8 cm)  Body mass index is 26.54 kg/m².      Intake/Output Summary (Last 24 hours) at 9/5/2022 0723  Last data filed at 9/5/2022 0400  Gross per 24 hour   Intake 960 ml   Output 3750 ml   Net -2790 ml       Physical Exam:   Musculoskeletal:     Left lower extremity: ex fix to the LLE remains in place.Proximal pin site with minimal serous drainage, no expressible purulence Soft dressing in place to the left knee and lower leg without obvious signs of drainage; erythema surrounding the proximal thigh has improved overnight following initiation of antibiotics; compartments are soft and compressible; No pain with ROM at the ankle or hip. No range of motion at the knee; moderate tenderness to palpation; dorsi/plantar flexes the foot; SILT distally; BCR distally; DP pulse palpable      Diagnostic Findings:   Significant Labs:   Recent Lab Results       None             Significant Imaging: I have reviewed and interpreted all pertinent imaging results/findings.     Assessment/Plan:     Active Diagnoses:    Diagnosis Date Noted POA    PRINCIPAL PROBLEM:  Displaced fracture of lateral condyle of left femur, initial encounter for open fracture type IIIA, IIIB, or IIIC [S72.422C] 08/24/2022 Yes    Type III open displaced fracture of medial condyle of left tibia with routine healing [S82.132F] 08/24/2022 Not Applicable      Problems Resolved During this Admission:     Started zosyn for possible pin site infection yesterday.  Dressings are clean and dry this morning with minimal serous drainage and surrounding fibrinous exudate at the pin site.  No surrounding erythema noted " this morning.  Remain NWB to the LLE. No range of motion to the left knee. Ex fix to remain in place x 8 weeks followed by removal and progressive weight bearing at that time.   Lovenox for DVT ppx during stay.   Will continue to monitor over the next 24 hours and have Dr. Chatterjee evaluate the patient tomorrow morning.  I do not feel as though he will need revision of his ex fix or irrigation debridement of his pin site.      Juan Ramon Samayoa MD  Orthopedic Trauma  [facility]

## 2022-09-05 NOTE — PROGRESS NOTES
[facility] Hospital Medicine Progress Note        Chief Complaint: Inpatient Follow-up for consult for med mgmt     HPI:   61 yo AAM with hx Schizophrenia and HTN who presents to the ED after a ground level fall landing on his left knee. He is an extremely poor historian. He sustained a displaced open femur fracture grade 3A vs B.  that warranted immediate surgical intervention. Dr. Chatterjee was consulted and pt underwent I&D and external fixation. The Hospitalist Service was consulted for medical management.    Was also found to have hyponatremia that is improving.  Plan for open reduction and internal fixation revision knee fusion today.  Patient ripped his IV lines last night and also refusing lab work     August 28 it patient had left knee arthrodesis revision and left knee external fixator adjustment           Interval Hx:  Patient seen and examined this morning no complaints  Patient was started on IV Zosyn for possible pin site infection by Orthopedic  Add lactobacillus  Orthopedic recs are to continue ex fix for 8 weeks and then removal, then begin progressive weight-bearing at that time.         Objective/physical exam:  General: In no acute distress, afebrile  Chest: Clear to auscultation bilaterally  Heart: RRR, +S1, S2, no appreciable murmur  Abdomen: Soft, nontender, BS +  Neurologic: Alert and oriented   Left leg external fixation in place no obvious drainage     Assessment/Plan:   Possible pin site infection  Acute watery diarrhea  Headaches  Rash- itchy, improving   Hypovolemic Hyponatremia   Schizophrenia, unspecified  Essential HTN  Open Distal Left Femur Fracture s/p I&D and ORIF 8/24      plan  Continue IV Zosyn , day 2   Add lactobacillus  begin imodium, d/c laxatives   add on fioricet prn  Add hydrocortisone cream  Continue with PT and OT, case management is working on rehab placement  Patient was started back on haldol and benztropine   Patient is status post left  knee arthrodesis revision on August 28 and left knee external fixator adjustment  Ortho wants the patient to be nonweightbearing for approximately 8 weeks followed by ex fix removal  Continue with PT and OT      prophylaxis Lovenox     Patient condition:  Stable     Anticipated discharge and Disposition:  Rehab    VITAL SIGNS: 24 HRS MIN & MAX LAST   Temp  Min: 97.3 °F (36.3 °C)  Max: 99 °F (37.2 °C) 97.3 °F (36.3 °C)   BP  Min: 103/65  Max: 129/75 113/65   Pulse  Min: 92  Max: 102  92   Resp  Min: 17  Max: 20 20   SpO2  Min: 96 %  Max: 100 % 96 %       Recent Labs   Lab 08/30/22  0656 09/02/22  0622   WBC 13.2* 11.1   RBC 3.25* 3.14*   HGB 9.6* 9.2*   HCT 30.9* 30.2*   MCV 95.1* 96.2*   MCH 29.5 29.3   MCHC 31.1* 30.5*   RDW 14.2 14.3    400   MPV 8.9 9.0       Recent Labs   Lab 08/30/22  0656 08/31/22  0340 09/02/22 0622    135* 136   K 4.5 4.6 4.9   CO2 25 26 27   BUN 9.7 9.1 9.3   CREATININE 0.81 0.78 0.77   CALCIUM 9.1 9.1 8.8          Microbiology Results (last 7 days)       ** No results found for the last 168 hours. **             See below for Radiology    Scheduled Med:   amantadine HCL  100 mg Oral BID    benztropine  2 mg Oral Daily    enoxaparin  40 mg Subcutaneous Daily    haloperidoL  5 mg Oral Daily before evening meal    hydrocortisone   Topical (Top) TID    Lactobacillus acidophilus  1 capsule Oral TID WM    piperacillin-tazobactam (ZOSYN) IVPB  4.5 g Intravenous Q8H        Continuous Infusions:       PRN Meds:  acetaminophen, bisacodyL, butalbital-acetaminophen-caffeine -40 mg, hydrALAZINE, HYDROcodone-acetaminophen, HYDROmorphone, loperamide, magnesium hydroxide 400 mg/5 ml, morphine, ondansetron, ondansetron, sodium chloride 0.9%, sodium chloride 0.9%, sodium chloride 0.9%     VTE prophylaxis:     Patient condition:  Stable/Fair/Guarded/ Serious/ Critical    Anticipated discharge and Disposition:         All diagnosis and differential diagnosis have been reviewed; assessment  and plan has been documented; I have personally reviewed the labs and test results that are presently available; I have reviewed the patients medication list; I have reviewed the consulting providers response and recommendations. I have reviewed or attempted to review medical records based upon their availability    All of the patient's questions have been  addressed and answered. Patient's is agreeable to the above stated plan. I will continue to monitor closely and make adjustments to medical management as needed.  _____________________________________________________________________    Nutrition Status:    Radiology:  SURG FL Surgery Fluoro Usage  See OP Notes for results.     IMPRESSION: See OP Notes for results.     This procedure was auto-finalized by: Virtual Radiologist      Nereida Perdomo MD   09/05/2022

## 2022-09-05 NOTE — PT/OT/SLP RE-EVAL
Physical Therapy Re-evaluation    Patient Name:  Brandyn Ty   MRN:  [id number]    Recommendations:     Discharge Recommendations:  rehabilitation facility   Discharge Equipment Recommendations: walker, rolling   Barriers to discharge: Decreased caregiver support    Assessment:     Brandyn Ty is a 62 y.o. male admitted with a medical diagnosis of Displaced fracture of lateral condyle of left femur, initial encounter for open fracture type IIIA, IIIB, or IIIC s/p external fixator placement L knee. Pt is NWB LLE, no ROM L knee.  He presents with the following impairments/functional limitations:  weakness, impaired endurance, impaired self care skills, impaired functional mobility, gait instability, impaired balance, impaired cognition, decreased safety awareness, orthopedic precautions. At baseline, pt lives in a group home and is independent with mobility and ADLs with a straight cane. Pt with hx of schizophrenia. Pt is appropriate and follows all commands during re-eval. However, he does need extra cueing for safety and can be impulsive. Pt is CGA for all mobility at this time, and does maintain LLE NWB. Recommending Rehab at this time.     Rehab Prognosis:  Good; patient would benefit from acute skilled PT services to address these deficits and reach maximum level of function.      Recent Surgery: Procedure(s) (LRB):  arthrodesis  (Left) 8 Days Post-Op    Plan:     During this hospitalization, patient to be seen daily to address the above listed problems via gait training, therapeutic activities, therapeutic exercises  Plan of Care Expires:  10/01/22  Plan of Care Reviewed with: patient    Subjective     Communicated with nurse prior to session.  Patient found up in chair with external fixator upon PT entry to room, agreeable to evaluation.      Chief Complaint: wanting to brush his teeth  Patient comments/goals: to get well  Pain/Comfort:  Pain Rating 1: 8/10  Location - Side 1:  Left  Location 1: leg  Pain Addressed 1: Nurse notified, Reposition    Patients cultural, spiritual, Moravian conflicts given the current situation: no      Objective:     Patient found with: external fixator     General Precautions: Standard,     Orthopedic Precautions:LLE non weight bearing   Braces:    Respiratory Status: Room air    Exams:  RLE ROM: WNL  RLE Strength: WNL  LLE ROM: Deficits: unable to perform ROM L knee due to restrictions and ex fix.  LLE Strength: Deficits: 3/5 hip flexion    Functional Mobility:  Bed Mobility:     Scooting: stand by assistance  Transfers:     Sit to Stand:  contact guard assistance with rolling walker and VC for safety. Pt attempted to stand before walker was close enough to him to use, VC for patience as PT set up safe environment.   Gait: 60ft with RW and CGA, slow pace, NWB LLE.    AM-PAC 6 CLICK MOBILITY  Total Score:18       Therapeutic Activities and Exercises:   Pt stood at sink side LLE NWB, CGA, to brush teeth.    Patient left up in chair with all lines intact, call button in reach, and chair alarm on.    GOALS:   Multidisciplinary Problems       Physical Therapy Goals          Problem: Physical Therapy    Goal Priority Disciplines Outcome Goal Variances Interventions   Physical Therapy Goal     PT, PT/OT Ongoing, Progressing     Description: Goals to be met by: 2022     Patient will increase functional independence with mobility by performin. Supine to sit with Modified [place]  2. Sit to supine with Modified [place]  3. Sit to stand transfer with Mod I   4. Gait  x 200 feet with Modified [place] using Rolling Walker.                          History:     Past Medical History:   Diagnosis Date    Anxiety     Hypertension        Past Surgical History:   Procedure Laterality Date    APPLICATION OF LARGE EXTERNAL FIXATION DEVICE TO TIBIA Left 2022    Procedure: APPLICATION, EXTERNAL FIXATION DEVICE, LARGE, TIBIA;  Surgeon: Shawn UGARTE  DO Sen;  Location: [facility];  Service: Orthopedics;  Laterality: Left;       Time Tracking:     PT Received On: 09/05/22  PT Start Time: 0833     PT Stop Time: 0848  PT Total Time (min): 15 min     Billable Minutes: Re-eval 15      09/05/2022

## 2022-09-06 PROCEDURE — 25000003 PHARM REV CODE 250: Performed by: INTERNAL MEDICINE

## 2022-09-06 PROCEDURE — 63600175 PHARM REV CODE 636 W HCPCS: Performed by: ORTHOPAEDIC SURGERY

## 2022-09-06 PROCEDURE — 97535 SELF CARE MNGMENT TRAINING: CPT | Mod: CO

## 2022-09-06 PROCEDURE — 25000003 PHARM REV CODE 250: Performed by: NURSE PRACTITIONER

## 2022-09-06 PROCEDURE — 63600175 PHARM REV CODE 636 W HCPCS: Performed by: INTERNAL MEDICINE

## 2022-09-06 PROCEDURE — 63600175 PHARM REV CODE 636 W HCPCS: Performed by: PHYSICIAN ASSISTANT

## 2022-09-06 PROCEDURE — 97116 GAIT TRAINING THERAPY: CPT | Mod: CQ

## 2022-09-06 PROCEDURE — 25000003 PHARM REV CODE 250: Performed by: PHYSICIAN ASSISTANT

## 2022-09-06 PROCEDURE — 25000003 PHARM REV CODE 250

## 2022-09-06 PROCEDURE — 11000001 HC ACUTE MED/SURG PRIVATE ROOM

## 2022-09-06 RX ORDER — HALOPERIDOL DECANOATE 100 MG/ML
100 INJECTION INTRAMUSCULAR ONCE
Status: COMPLETED | OUTPATIENT
Start: 2022-09-06 | End: 2022-09-06

## 2022-09-06 RX ADMIN — HALOPERIDOL DECANOATE 100 MG: 100 INJECTION INTRAMUSCULAR at 10:09

## 2022-09-06 RX ADMIN — HYDROCODONE BITARTRATE AND ACETAMINOPHEN 1 TABLET: 5; 325 TABLET ORAL at 01:09

## 2022-09-06 RX ADMIN — HYDROCODONE BITARTRATE AND ACETAMINOPHEN 1 TABLET: 5; 325 TABLET ORAL at 08:09

## 2022-09-06 RX ADMIN — Medication 1 CAPSULE: at 04:09

## 2022-09-06 RX ADMIN — BENZTROPINE MESYLATE 2 MG: 1 TABLET ORAL at 08:09

## 2022-09-06 RX ADMIN — PIPERACILLIN SODIUM AND TAZOBACTAM SODIUM 4.5 G: 4; .5 INJECTION, POWDER, LYOPHILIZED, FOR SOLUTION INTRAVENOUS at 04:09

## 2022-09-06 RX ADMIN — PIPERACILLIN SODIUM AND TAZOBACTAM SODIUM 4.5 G: 4; .5 INJECTION, POWDER, LYOPHILIZED, FOR SOLUTION INTRAVENOUS at 08:09

## 2022-09-06 RX ADMIN — HYDROCORTISONE: 10 CREAM TOPICAL at 08:09

## 2022-09-06 RX ADMIN — HYDROCODONE BITARTRATE AND ACETAMINOPHEN 1 TABLET: 5; 325 TABLET ORAL at 04:09

## 2022-09-06 RX ADMIN — Medication 1 CAPSULE: at 06:09

## 2022-09-06 RX ADMIN — HALOPERIDOL 5 MG: 5 TABLET ORAL at 04:09

## 2022-09-06 RX ADMIN — PIPERACILLIN SODIUM AND TAZOBACTAM SODIUM 4.5 G: 4; .5 INJECTION, POWDER, LYOPHILIZED, FOR SOLUTION INTRAVENOUS at 01:09

## 2022-09-06 NOTE — PT/OT/SLP PROGRESS
Occupational Therapy  Treatment    Brandyn Ty   MRN: [id number]   Admitting Diagnosis: Displaced fracture of lateral condyle of left femur, initial encounter for open fracture type IIIA, IIIB, or IIIC    OT Date of Treatment: 09/06/22   OT Start Time: 0930  OT Stop Time: 0942  OT Total Time (min): 12 min     Billable Minutes:  Self Care/Home Management 12  Total Minutes: 12     OT/IVANNA: IVANNA     IVANNA Visit Number: 3    General Precautions: Standard, fall  Orthopedic Precautions: LLE non weight bearing  Braces:      Spiritual, Cultural Beliefs, Buddhism Practices, Values that Affect Care: no    Subjective:  Agreeable to OT session.          Objective:  Patient found with: external fixator    Functional Mobility:  Bed Mobility:   Supine to sit: Activity did not occur   Sit to supine: Activity did not occur   Rolling: Activity did not occur   Scooting: Activity did not occur    Transfer Training:  CGA EOB>BS recliner     Grooming:  Oral hygiene while standing at sink with min A. Functional mobility back to BS recliner.       Balance:   Static Sit: GOOD-: Takes MODERATE challenges from all directions but inconsistently  Dynamic Sit:  GOOD-: Incosistently Maintains balance through MODERATE excursions of active trunk movement,     Static Stand: POOR+: Needs MINIMAL assist to maintain  Dynamic stand: FAIR: Needs CONTACT GUARD during gait    Additional Treatment:      Patient left up in chair with all lines intact, call button in reach, and chair alarm on    ASSESSMENT:  Brandyn Ty is a 62 y.o. male with a medical diagnosis of Displaced fracture of lateral condyle of left femur, initial encounter for open fracture type IIIA, IIIB, or IIIC.    Rehab potential is good    Activity tolerance: Good    Discharge recommendations: nursing facility, skilled     Equipment recommendations:       GOALS:   Multidisciplinary Problems       Occupational Therapy Goals          Problem: Occupational Therapy    Goal  Priority Disciplines Outcome Interventions   Occupational Therapy Goal     OT, PT/OT Ongoing, Progressing    Description: Goals to be met by: 9/8    Patient will increase functional independence with ADLs by performing:    LE Dressing with Modified [place].  Grooming while standing at sink with Modified [place].  Toileting from toilet with Modified [place] for hygiene and clothing management.   Toilet transfer to toilet with Modified [place].                         Plan:  Patient to be seen 5 x/week, daily to address the above listed problems via self-care/home management, therapeutic exercises, therapeutic activities  Plan of Care expires: 09/12/22  Plan of Care reviewed with: patient         09/06/2022

## 2022-09-06 NOTE — PROGRESS NOTES
"Inpatient Nutrition Evaluation    Admit Date: 8/24/2022   Nutrition Recommendation/Prescription     Continue regular diet as tolerated  RD to monitor po intake and weight changes    Nutrition Assessment     Chart Review    Reason Seen: length of stay    Diagnosis: Displaced fracture of lateral condyle of left femur, initial encounter for open fracture type IIIA, IIIB, or IIIC      Relevant Medical History: Schizophrenia, HTN     Nutrition-Related Medications: NaCl, zofran PRN    Nutrition-Related Labs: 8/30: WBC-13.2, RBC-3.25, H/H-9.6/30.9, glu-80  9/6: no recent labs      Diet Order: Diet Adult Regular  Oral Supplement Order: none at this time  Appetite/Oral Intake: good/% of meals  Factors Affecting Nutritional Intake: constipation  Food/Taoism/Cultural Preferences: none reported    Skin Integrity: puncture (surgical)  Wound(s):   n/a    Comments    8/30: pt reports good appetite, with constipation. UBW reported 185 lb with no weight loss.Admit weight of 83.9 kg (185 lb) on 8/24 and 8/25.    9/6: good appetite, eating 100% of most meals    Anthropometrics    Height: 5' 10" (177.8 cm)    Last Weight: 83.9 kg (185 lb) (08/25/22 2015) Weight Method: Bed Scale  BMI (Calculated): 26.5  BMI Classification: overweight (BMI 25-29.9)        Ideal Body Weight (IBW), Male: 166 lb  % Ideal Body Weight, Male (lb): 111.45 %                   Wt Readings from Last 5 Encounters:   08/25/22 83.9 kg (185 lb)   02/06/20 86.6 kg (191 lb)   11/11/19 87.6 kg (193 lb 3.2 oz)   10/23/19 86 kg (189 lb 9.6 oz)   08/22/19 87.5 kg (193 lb)     Weight Change(s) Since Admission:  Admit Weight: 83.9 kg (185 lb) (08/24/22 2115)    Patient Education    Not applicable.    Monitoring & Evaluation     Dietitian will monitor food and beverage intake and weight change.  Nutrition Risk/Follow-Up: low (follow-up in 5-7 days)  Patients assigned 'low nutrition risk' status do not qualify for a full nutritional assessment but will be monitored " and re-evaluated in a 5-7 day time period.

## 2022-09-06 NOTE — PROGRESS NOTES
[facility]  Orthopedics  Progress Note    Patient Name: Brandyn Ty  MRN: [id number]  Admission Date: 8/24/2022  Hospital Length of Stay: 13 days  Attending Provider: Alivia Darling MD  Primary Care Provider: Elizabeth Marin MD  Follow-up For: Procedure(s) (LRB):  arthrodesis  (Left)    Post-Operative Day: 9 Day Post-Op  Subjective:     Principal Problem:Displaced fracture of lateral condyle of left femur, initial encounter for open fracture type IIIA, IIIB, or IIIC    Principal Orthopedic Problem: 9 Days Post-Op left revision knee arthrodesis    Interval History: Patient doing well this morning.  Resting comfortably in bed. Some sharp sensation at the proximal thigh, otherwise no acute complaints.      Review of patient's allergies indicates:   Allergen Reactions    Chlorpromazine      Other reaction(s): Chlorpromazine, Unknown - See comments    Fluphenazine      Other reaction(s): Fluphenazine, Unknown - See comments    Trazodone      Other reaction(s): Trazodone, Unknown - See comments       Current Facility-Administered Medications   Medication    acetaminophen tablet 650 mg    amantadine HCL capsule/tablet 100 mg    benztropine tablet 2 mg    bisacodyL suppository 10 mg    butalbital-acetaminophen-caffeine -40 mg per tablet 1 tablet    enoxaparin injection 40 mg    haloperidoL tablet 5 mg    hydrALAZINE injection 10 mg    HYDROcodone-acetaminophen 5-325 mg per tablet 1 tablet    hydrocortisone 1 % cream    HYDROmorphone (PF) injection 0.4 mg    Lactobacillus acidophilus capsule 1 capsule    loperamide capsule 2 mg    magnesium hydroxide 400 mg/5 ml suspension 2,400 mg    morphine injection 4 mg    ondansetron injection 4 mg    ondansetron injection 4 mg    piperacillin-tazobactam (ZOSYN) 4.5 g in dextrose 5 % in water (D5W) 5 % 100 mL IVPB (MB+)    sodium chloride 0.9% flush 10 mL    sodium chloride 0.9% flush 10 mL    sodium chloride 0.9% flush 10 mL     Objective:  "    Vital Signs (Most Recent):  Temp: 98.1 °F (36.7 °C) (09/06/22 0415)  Pulse: 89 (09/06/22 0415)  Resp: 16 (09/06/22 0415)  BP: 106/67 (09/06/22 0415)  SpO2: 99 % (09/06/22 0415) Vital Signs (24h Range):  Temp:  [97.3 °F (36.3 °C)-99.2 °F (37.3 °C)] 98.1 °F (36.7 °C)  Pulse:  [] 89  Resp:  [16-20] 16  SpO2:  [96 %-100 %] 99 %  BP: (106-118)/(65-72) 106/67     Weight: 83.9 kg (185 lb)  Height: 5' 10" (177.8 cm)  Body mass index is 26.54 kg/m².      Intake/Output Summary (Last 24 hours) at 9/6/2022 0702  Last data filed at 9/6/2022 0336  Gross per 24 hour   Intake 1920 ml   Output 2475 ml   Net -555 ml         Physical Exam:   Musculoskeletal:     Left lower extremity: ex fix to the LLE remains in place.Proximal pin site with minimal serous drainage, no expressible purulence. Soft dressing in place to the left knee and lower leg without obvious signs of drainage; erythema surrounding the proximal thigh mostly resolved as compared to previous exam; compartments are soft and compressible; No pain with ROM at the ankle or hip. No range of motion at the knee; moderate tenderness to palpation; dorsi/plantar flexes the foot; SILT distally; BCR distally; DP pulse palpable      Diagnostic Findings:   Significant Labs:   Recent Lab Results       None             Significant Imaging: I have reviewed and interpreted all pertinent imaging results/findings.     Assessment/Plan:     Active Diagnoses:    Diagnosis Date Noted POA    PRINCIPAL PROBLEM:  Displaced fracture of lateral condyle of left femur, initial encounter for open fracture type IIIA, IIIB, or IIIC [S72.422C] 08/24/2022 Yes    Type III open displaced fracture of medial condyle of left tibia with routine healing [S82.132F] 08/24/2022 Not Applicable      Problems Resolved During this Admission:     Continue Zosyn during stay. D/C home with oral keflex x 10 days.   Erythema improving with no worsening signs of infection. No purulence or increased drainage from " pin sites.   Remain NWB to the LLE. No range of motion to the left knee. Ex fix to remain in place x 8 weeks followed by removal and progressive weight bearing at that time.   Lovenox for DVT ppx during stay.   No plan for I&D or removal of ex-fix at this time. Continue with conservative tx with IV abx durign stay with short course of ral abx upon discharge.   Sutures/staples out to the left knee in approx 12 days.   Follow up with Dr. Chatterjee in 2-3 weeks for repeat x-rays and continued management.     The above findings, diagnostics, and treatment plan were discussed with Dr. Chatterjee who is in agreement with the plan of care except as stated in additional documentation.       [facility]   Orthopedic Trauma

## 2022-09-06 NOTE — PT/OT/SLP PROGRESS
Physical Therapy         Treatment        Brandyn Ty   MRN: [id number]        PT Start Time: 1117     PT Stop Time: 1129    PT Total Time (min): 12 min       Billable Minutes:  Gait [id number]  Total Minutes: 12    Treatment Type: Treatment  PT/PTA: PTA     PTA Visit Number: 1       General Precautions: Standard,    Orthopedic Precautions: Orthopedic Precautions : LLE non weight bearing   Braces:      Spiritual, Cultural Beliefs, Nondenominational Practices, Values that Affect Care: no    Subjective:  Communicated with nurse prior to session.    Pain/Comfort  Pain Rating 1: 10/10  Location - Side 1: Left  Location 1: knee  Pain Addressed 1: Reposition, Distraction    Objective:  Patient found sitting UIC upon arrival, with  chair alarm activated    Functional Mobility:    Balance:   Static Stand: GOOD: Takes MODERATE challenges from all directions  Dynamic stand: 0: N/A    Transfer Training:  Sit to stand:Contact Guard Assistance with Rolling Walker sit to stand from bedside chair, pt was able to keep NWB LLE    Gait Training:  Patient gait trained NWB: left lower extremity 100  feet on level tile with Rolling Walker with Contact Guard Assistance.  Pt with demonstarting a  swing-to gait with decreased pj and decreased step length.Impairments contributing to gait deviations include pain, decreased ROM, decreased strength, and chair in tow for safety, no major LOB noted    Additional Treatment:  Semi supine therex, LLE 10x ankle pumps  Pt r/f to perform hip  therex at this time, stating this causes too much knee pain, despite education given on importance of ranging leg    Activity Tolerance:  Patient tolerated treatment well and Patient limited by pain    Patient left up in chair with all lines intact, call button in reach, and chair alarm on.    Assessment:  Brandyn Ty is a 62 y.o. male with a medical diagnosis of Displaced fracture of lateral condyle of left femur, initial encounter for open  fracture type IIIA, IIIB, or IIIC.  Pt was able to hop further today, however r/f to range L hip at this time, with call before fall educated, attempted to see pt at 0825- pt utilizing restroom at this time    Rehab potential is good.    Activity tolerance: Fair    Discharge recommendations: Discharge Facility/Level of Care Needs: nursing facility, skilled, other (see comments) (vs swing bed)     Equipment recommendations: Equipment Needed After Discharge: walker, rolling     GOALS:   Multidisciplinary Problems       Physical Therapy Goals          Problem: Physical Therapy    Goal Priority Disciplines Outcome Goal Variances Interventions   Physical Therapy Goal     PT, PT/OT Ongoing, Progressing     Description: Goals to be met by: 2022     Patient will increase functional independence with mobility by performin. Supine to sit with Modified [place]  2. Sit to supine with Modified [place]  3. Sit to stand transfer with Mod I   4. Gait  x 200 feet with Modified [place] using Rolling Walker.                          PLAN:    Patient to be seen daily  to address the above listed problems via gait training, therapeutic activities, therapeutic exercises  Plan of Care expires: 10/01/22  Plan of Care reviewed with: patient         2022

## 2022-09-06 NOTE — PROGRESS NOTES
[facility] Medicine Progress Note      Chief Complaint: Inpatient Follow-up for consult for med mgmt     HPI:   61 yo AAM with hx Schizophrenia and HTN who presents to the ED after a ground level fall landing on his left knee. He is an extremely poor historian. He sustained a displaced open femur fracture grade 3A vs B.  that warranted immediate surgical intervention. Dr. Chatterjee was consulted and pt underwent I&D and external fixation. The Hospitalist Service was consulted for medical management.    Was also found to have hyponatremia that is improving.  Plan for open reduction and internal fixation revision knee fusion today.  Patient ripped his IV lines last night and also refusing lab work     August 28 it patient had left knee arthrodesis revision and left knee external fixator adjustment  Orthopedic recs are to continue ex fix for 8 weeks and then removal, then begin progressive weight-bearing at that time.                Interval Hx:  Patient seen and examined this morning no complaints  Ortho changed to po keflex   Give IM Haldol dose 100mg q monthly today       Objective/physical exam:  General: In no acute distress, afebrile  Chest: Clear to auscultation bilaterally  Heart: RRR, +S1, S2, no appreciable murmur  Abdomen: Soft, nontender, BS +  Neurologic: Alert and oriented   Left leg external fixation in place no obvious drainage     Assessment/Plan:   Possible pin site infection  Acute watery diarrhea  Headaches  Rash- itchy, improving   Hypovolemic Hyponatremia   Schizophrenia, unspecified  Essential HTN  Open Distal Left Femur Fracture s/p I&D and ORIF 8/24      plan  Ortho changed to po keflex   Give IM Haldol dose 100mg q monthly today  Add lactobacillus  begin imodium, d/c laxatives   add on fioricet prn  Add hydrocortisone cream  Continue with PT and OT, case management is working on rehab placement  Patient was started back on haldol and benztropine  Patient is  status post left knee arthrodesis revision on August 28 and left knee external fixator adjustment  Ortho wants the patient to be nonweightbearing for approximately 8 weeks followed by ex fix removal  Continue with PT and OT      prophylaxis Lovenox     Patient condition:  Stable     Anticipated discharge and Disposition:  Rehab           VITAL SIGNS: 24 HRS MIN & MAX LAST   Temp  Min: 98.1 °F (36.7 °C)  Max: 99.2 °F (37.3 °C) 98.8 °F (37.1 °C)   BP  Min: 106/67  Max: 114/65 114/65   Pulse  Min: 88  Max: 103  93   Resp  Min: 16  Max: 20 16   SpO2  Min: 97 %  Max: 99 % 99 %       Recent Labs   Lab 09/02/22  0622   WBC 11.1   RBC 3.14*   HGB 9.2*   HCT 30.2*   MCV 96.2*   MCH 29.3   MCHC 30.5*   RDW 14.3      MPV 9.0       Recent Labs   Lab 08/31/22  0340 09/02/22  0622   * 136   K 4.6 4.9   CO2 26 27   BUN 9.1 9.3   CREATININE 0.78 0.77   CALCIUM 9.1 8.8          Microbiology Results (last 7 days)       ** No results found for the last 168 hours. **             See below for Radiology    Scheduled Med:   amantadine HCL  100 mg Oral BID    benztropine  2 mg Oral Daily    enoxaparin  40 mg Subcutaneous Daily    haloperidoL  5 mg Oral Daily before evening meal    hydrocortisone   Topical (Top) TID    Lactobacillus acidophilus  1 capsule Oral TID WM    piperacillin-tazobactam (ZOSYN) IVPB  4.5 g Intravenous Q8H        Continuous Infusions:       PRN Meds:  acetaminophen, bisacodyL, butalbital-acetaminophen-caffeine -40 mg, hydrALAZINE, HYDROcodone-acetaminophen, HYDROmorphone, loperamide, magnesium hydroxide 400 mg/5 ml, morphine, ondansetron, ondansetron, sodium chloride 0.9%, sodium chloride 0.9%, sodium chloride 0.9%         VTE prophylaxis:     Patient condition:  Stable/Fair/Guarded/ Serious/ Critical    Anticipated discharge and Disposition:         All diagnosis and differential diagnosis have been reviewed; assessment and plan has been documented; I have personally reviewed the labs and test  results that are presently available; I have reviewed the patients medication list; I have reviewed the consulting providers response and recommendations. I have reviewed or attempted to review medical records based upon their availability    All of the patient's questions have been  addressed and answered. Patient's is agreeable to the above stated plan. I will continue to monitor closely and make adjustments to medical management as needed.  _____________________________________________________________________    Nutrition Status:    Radiology:  SURG FL Surgery Fluoro Usage  See OP Notes for results.     IMPRESSION: See OP Notes for results.     This procedure was auto-finalized by: Virtual Radiologist      Nereida Perdomo MD   09/06/2022

## 2022-09-07 PROCEDURE — 63600175 PHARM REV CODE 636 W HCPCS: Performed by: PHYSICIAN ASSISTANT

## 2022-09-07 PROCEDURE — 25000003 PHARM REV CODE 250: Performed by: NURSE PRACTITIONER

## 2022-09-07 PROCEDURE — 25000003 PHARM REV CODE 250

## 2022-09-07 PROCEDURE — 11000001 HC ACUTE MED/SURG PRIVATE ROOM

## 2022-09-07 PROCEDURE — 25000003 PHARM REV CODE 250: Performed by: PHYSICIAN ASSISTANT

## 2022-09-07 PROCEDURE — 25000003 PHARM REV CODE 250: Performed by: INTERNAL MEDICINE

## 2022-09-07 PROCEDURE — 97535 SELF CARE MNGMENT TRAINING: CPT

## 2022-09-07 RX ORDER — PANTOPRAZOLE SODIUM 40 MG/1
40 TABLET, DELAYED RELEASE ORAL
Status: DISCONTINUED | OUTPATIENT
Start: 2022-09-07 | End: 2022-09-12

## 2022-09-07 RX ORDER — LOPERAMIDE HYDROCHLORIDE 2 MG/1
2 CAPSULE ORAL 4 TIMES DAILY PRN
Status: DISCONTINUED | OUTPATIENT
Start: 2022-09-07 | End: 2022-09-28 | Stop reason: HOSPADM

## 2022-09-07 RX ADMIN — HYDROCODONE BITARTRATE AND ACETAMINOPHEN 1 TABLET: 5; 325 TABLET ORAL at 08:09

## 2022-09-07 RX ADMIN — HYDROCODONE BITARTRATE AND ACETAMINOPHEN 1 TABLET: 5; 325 TABLET ORAL at 05:09

## 2022-09-07 RX ADMIN — HALOPERIDOL 5 MG: 5 TABLET ORAL at 05:09

## 2022-09-07 RX ADMIN — PIPERACILLIN SODIUM AND TAZOBACTAM SODIUM 4.5 G: 4; .5 INJECTION, POWDER, LYOPHILIZED, FOR SOLUTION INTRAVENOUS at 04:09

## 2022-09-07 RX ADMIN — PIPERACILLIN SODIUM AND TAZOBACTAM SODIUM 4.5 G: 4; .5 INJECTION, POWDER, LYOPHILIZED, FOR SOLUTION INTRAVENOUS at 09:09

## 2022-09-07 RX ADMIN — HYDROCODONE BITARTRATE AND ACETAMINOPHEN 1 TABLET: 5; 325 TABLET ORAL at 12:09

## 2022-09-07 RX ADMIN — PIPERACILLIN SODIUM AND TAZOBACTAM SODIUM 4.5 G: 4; .5 INJECTION, POWDER, LYOPHILIZED, FOR SOLUTION INTRAVENOUS at 01:09

## 2022-09-07 RX ADMIN — HYDROCODONE BITARTRATE AND ACETAMINOPHEN 1 TABLET: 5; 325 TABLET ORAL at 04:09

## 2022-09-07 RX ADMIN — BENZTROPINE MESYLATE 2 MG: 1 TABLET ORAL at 08:09

## 2022-09-07 RX ADMIN — PANTOPRAZOLE SODIUM 40 MG: 40 TABLET, DELAYED RELEASE ORAL at 04:09

## 2022-09-07 RX ADMIN — HYDROCORTISONE: 10 CREAM TOPICAL at 08:09

## 2022-09-07 RX ADMIN — HYDROCODONE BITARTRATE AND ACETAMINOPHEN 1 TABLET: 5; 325 TABLET ORAL at 01:09

## 2022-09-07 NOTE — PT/OT/SLP PROGRESS
"Occupational Therapy  Treatment    Brandyn Ty   MRN: [id number]   Admitting Diagnosis: Displaced fracture of lateral condyle of left femur, initial encounter for open fracture type IIIA, IIIB, or IIIC    OT Date of Treatment: 09/07/22   OT Start Time: 0940  OT Stop Time: 1003  OT Total Time (min): 23 min     Billable Minutes:  Self Care/Home Management 2 units  Total Minutes: 23 minutes      OT/IVANNA: OT     IVANNA Visit Number: 4    General Precautions: Standard, fall  Orthopedic Precautions: LLE non weight bearing  Braces: N/A    Spiritual, Cultural Beliefs, Latter day Practices, Values that Affect Care: no    Subjective:  Communicated with RN prior to session.    Pain/Comfort  Location - Side 1: Left  Location 1: leg  Pain Addressed 1: Reposition    Objective:  Patient found with: peripheral IV, external fixator    Functional Mobility:  Bed Mobility:   Supine to sit: Standby Assistance   Sit to supine: Activity did not occur   Rolling: Activity did not occur   Scooting: Standby Assistance    Transfer Training:   Sit to stand:Minimal Assistance with Rolling Walker CGA sit<>stand from EOB  Toilet Transfer:  Pt hop to with Minimal Assistance with Rolling Walker and Grab bars    Pt with incontinent episode while ambulating to toilet requiring Total A for clean up  LE Dressing:  Patient don/doffed socks with Total Assistance  Pt required Total A to don socks. Pt stated " I can't do that" and declined attempts despite encouragement.   Toilet Training:  Pt performed toileting with Stand-by Assistance with n/a at Commode.    Additional Treatment:    Patient left up in chair with all lines intact, call button in reach, and chair alarm on    ASSESSMENT:    Rehab potential is good    Activity tolerance: Good    Discharge recommendations: nursing facility, skilled, rehabilitation facility     Equipment recommendations: bedside commode, hip kit, walker, rolling     GOALS:   Multidisciplinary Problems       Occupational " Therapy Goals          Problem: Occupational Therapy    Goal Priority Disciplines Outcome Interventions   Occupational Therapy Goal     OT, PT/OT Ongoing, Progressing    Description: Goals to be met by: 9/8    Patient will increase functional independence with ADLs by performing:    LE Dressing with Modified [place].  Grooming while standing at sink with Modified [place].  Toileting from toilet with Modified [place] for hygiene and clothing management.   Toilet transfer to toilet with Modified [place].                         Plan:  Patient to be seen 5 x/week, daily to address the above listed problems via self-care/home management, therapeutic exercises, therapeutic activities  Plan of Care expires: 09/12/22  Plan of Care reviewed with: patient         09/07/2022

## 2022-09-07 NOTE — PT/OT/SLP PROGRESS
Physical Therapy      Patient Name:  Brandyn Ty   MRN:  [id number]    Patient not seen today secondary to w/ OT at first attempt, pt refusing during 2nd attempt 2/2 having a really bad headache and upset stomach, RN notified. Will follow-up as schedule permits.

## 2022-09-07 NOTE — PLAN OF CARE
Communication sent to Dr Perdomo that [facility] Acute rehab called that they were denied by insurer for acute rehab but did offer a peer to peer. We have to let the insurer know by 4:30 pm today. Ref number [id number]  phone  option 3.

## 2022-09-07 NOTE — PLAN OF CARE
Peer to Peer will occur tomorrow between 10 am and noon with Dr Ji calling Dr Perdomo directly. Ref number remains [id number]  Communication sent to Dr Perdomo and also to Kaleigh at [facility].   Will follow up

## 2022-09-07 NOTE — PROGRESS NOTES
[facility] Hospital Medicine Progress Note      Chief Complaint: Inpatient Follow-up for consult for med mgmt     HPI:   63 yo AAM with hx Schizophrenia and HTN who presents to the ED after a ground level fall landing on his left knee. He is an extremely poor historian. He sustained a displaced open femur fracture grade 3A vs B.  that warranted immediate surgical intervention. Dr. Chatterjee was consulted and pt underwent I&D and external fixation. The Hospitalist Service was consulted for medical management.    Was also found to have hyponatremia that is improving.  Plan for open reduction and internal fixation revision knee fusion today.  Patient ripped his IV lines last night and also refusing lab work     August 28 it patient had left knee arthrodesis revision and left knee external fixator adjustment  Orthopedic recs are to continue ex fix for 8 weeks and then removal, then begin progressive weight-bearing at that time.    S/p IM Haldol dose 100mg q monthly on 9/6        Interval Hx:  Patient seen and examined this morning no complaint  No overnight events    Objective/physical exam:  General: In no acute distress, afebrile  Chest: Clear to auscultation bilaterally  Heart: RRR, +S1, S2, no appreciable murmur  Abdomen: Soft, nontender, BS +  Neurologic: Alert and oriented   Left leg external fixation in place no obvious drainage     Assessment/Plan:   Possible pin site infection  Acute watery diarrhea  Headaches  Rash- itchy, improving   Hypovolemic Hyponatremia   Schizophrenia, unspecified  Essential HTN  Open Distal Left Femur Fracture s/p I&D and ORIF 8/24      plan  Ortho changed to po keflex   Add lactobacillus  begin imodium, d/c laxatives   add on fioricet prn  Add hydrocortisone cream  Patient is status post left knee arthrodesis revision on August 28 and left knee external fixator adjustment  Ortho wants the patient to be nonweightbearing for approximately 8 weeks followed by ex  fix removal  Continue with PT and OT      prophylaxis Lovenox     Patient condition:  Stable     Anticipated discharge and Disposition:  Rehab             VITAL SIGNS: 24 HRS MIN & MAX LAST   Temp  Min: 97.4 °F (36.3 °C)  Max: 99.2 °F (37.3 °C) 98.4 °F (36.9 °C)   BP  Min: 104/63  Max: 147/71 115/65   Pulse  Min: 77  Max: 87  87   Resp  Min: 16  Max: 20 18   SpO2  Min: 96 %  Max: 100 % 96 %       Recent Labs   Lab 09/02/22 0622   WBC 11.1   RBC 3.14*   HGB 9.2*   HCT 30.2*   MCV 96.2*   MCH 29.3   MCHC 30.5*   RDW 14.3      MPV 9.0       Recent Labs   Lab 09/02/22 0622      K 4.9   CO2 27   BUN 9.3   CREATININE 0.77   CALCIUM 8.8          Microbiology Results (last 7 days)       ** No results found for the last 168 hours. **             See below for Radiology    Scheduled Med:   amantadine HCL  100 mg Oral BID    benztropine  2 mg Oral Daily    enoxaparin  40 mg Subcutaneous Daily    haloperidoL  5 mg Oral Daily before evening meal    hydrocortisone   Topical (Top) TID    Lactobacillus acidophilus  1 capsule Oral TID WM    piperacillin-tazobactam (ZOSYN) IVPB  4.5 g Intravenous Q8H        Continuous Infusions:       PRN Meds:  acetaminophen, bisacodyL, butalbital-acetaminophen-caffeine -40 mg, hydrALAZINE, HYDROcodone-acetaminophen, HYDROmorphone, loperamide, magnesium hydroxide 400 mg/5 ml, morphine, ondansetron, ondansetron, sodium chloride 0.9%, sodium chloride 0.9%, sodium chloride 0.9%         VTE prophylaxis:     Patient condition:  Stable/Fair/Guarded/ Serious/ Critical    Anticipated discharge and Disposition:         All diagnosis and differential diagnosis have been reviewed; assessment and plan has been documented; I have personally reviewed the labs and test results that are presently available; I have reviewed the patients medication list; I have reviewed the consulting providers response and recommendations. I have reviewed or attempted to review medical records based upon their  availability    All of the patient's questions have been  addressed and answered. Patient's is agreeable to the above stated plan. I will continue to monitor closely and make adjustments to medical management as needed.  _____________________________________________________________________    Nutrition Status:    Radiology:  SURG FL Surgery Fluoro Usage  See OP Notes for results.     IMPRESSION: See OP Notes for results.     This procedure was auto-finalized by: Virtual Radiologist      Nereida Perdomo MD   09/07/2022

## 2022-09-07 NOTE — PLAN OF CARE
Problem: Fall Injury Risk  Goal: Absence of Fall and Fall-Related Injury  Outcome: Ongoing, Progressing     Problem: Skin Injury Risk Increased  Goal: Skin Health and Integrity  Outcome: Ongoing, Progressing     Problem: Adult Inpatient Plan of Care  Goal: Patient-Specific Goal (Individualized)  Outcome: Ongoing, Progressing  Flowsheets (Taken 9/6/2022 2877)  Patient-Specific Goals (Include Timeframe): TO GET TO REHAB SOON

## 2022-09-08 PROCEDURE — 25000003 PHARM REV CODE 250: Performed by: PHYSICIAN ASSISTANT

## 2022-09-08 PROCEDURE — 25000003 PHARM REV CODE 250

## 2022-09-08 PROCEDURE — 63600175 PHARM REV CODE 636 W HCPCS: Performed by: PHYSICIAN ASSISTANT

## 2022-09-08 PROCEDURE — 11000001 HC ACUTE MED/SURG PRIVATE ROOM

## 2022-09-08 PROCEDURE — 25000003 PHARM REV CODE 250: Performed by: INTERNAL MEDICINE

## 2022-09-08 PROCEDURE — 25000003 PHARM REV CODE 250: Performed by: NURSE PRACTITIONER

## 2022-09-08 PROCEDURE — 97535 SELF CARE MNGMENT TRAINING: CPT

## 2022-09-08 RX ORDER — CEPHALEXIN 500 MG/1
500 CAPSULE ORAL EVERY 6 HOURS
Status: COMPLETED | OUTPATIENT
Start: 2022-09-08 | End: 2022-09-10

## 2022-09-08 RX ADMIN — HYDROCODONE BITARTRATE AND ACETAMINOPHEN 1 TABLET: 5; 325 TABLET ORAL at 12:09

## 2022-09-08 RX ADMIN — HYDROCODONE BITARTRATE AND ACETAMINOPHEN 1 TABLET: 5; 325 TABLET ORAL at 08:09

## 2022-09-08 RX ADMIN — HYDROCORTISONE: 10 CREAM TOPICAL at 09:09

## 2022-09-08 RX ADMIN — PIPERACILLIN SODIUM AND TAZOBACTAM SODIUM 4.5 G: 4; .5 INJECTION, POWDER, LYOPHILIZED, FOR SOLUTION INTRAVENOUS at 08:09

## 2022-09-08 RX ADMIN — HYDROCODONE BITARTRATE AND ACETAMINOPHEN 1 TABLET: 5; 325 TABLET ORAL at 05:09

## 2022-09-08 RX ADMIN — CEPHALEXIN 500 MG: 500 CAPSULE ORAL at 01:09

## 2022-09-08 RX ADMIN — HYDROCODONE BITARTRATE AND ACETAMINOPHEN 1 TABLET: 5; 325 TABLET ORAL at 01:09

## 2022-09-08 RX ADMIN — CEPHALEXIN 500 MG: 500 CAPSULE ORAL at 11:09

## 2022-09-08 RX ADMIN — HALOPERIDOL 5 MG: 5 TABLET ORAL at 05:09

## 2022-09-08 RX ADMIN — HYDROCODONE BITARTRATE AND ACETAMINOPHEN 1 TABLET: 5; 325 TABLET ORAL at 06:09

## 2022-09-08 RX ADMIN — PANTOPRAZOLE SODIUM 40 MG: 40 TABLET, DELAYED RELEASE ORAL at 05:09

## 2022-09-08 RX ADMIN — HYDROCODONE BITARTRATE AND ACETAMINOPHEN 1 TABLET: 5; 325 TABLET ORAL at 10:09

## 2022-09-08 RX ADMIN — PIPERACILLIN SODIUM AND TAZOBACTAM SODIUM 4.5 G: 4; .5 INJECTION, POWDER, LYOPHILIZED, FOR SOLUTION INTRAVENOUS at 12:09

## 2022-09-08 RX ADMIN — CEPHALEXIN 500 MG: 500 CAPSULE ORAL at 06:09

## 2022-09-08 NOTE — PT/OT/SLP PROGRESS
Physical Therapy      Patient Name:  Brandyn Ty   MRN:  [id number]    Patient not seen today secondary to Other (Comment). Nurse present to give pt morning meds, once finished pt stated he wants to eat breakfast. Will follow-up 09/09, unless time permits later on today.

## 2022-09-08 NOTE — PLAN OF CARE
Followed up with  who confirms peer to peer was done and pt is denied inpt acute rehab. Kaleigh at [facility] aware  I visited with pt to update him. He tells me skilled is fine and he doesn't care where but the [place] or [place] area is desired since he resides in [facility] and will work on locating a skilled bed for pt.

## 2022-09-08 NOTE — PROGRESS NOTES
[facility] Hospital Medicine Progress Note        Chief Complaint: Inpatient Follow-up for consult for med mgmt     HPI:   63 yo AAM with hx Schizophrenia and HTN who presents to the ED after a ground level fall landing on his left knee. He is an extremely poor historian. He sustained a displaced open femur fracture grade 3A vs B.  that warranted immediate surgical intervention. Dr. Chatterjee was consulted and pt underwent I&D and external fixation. The Hospitalist Service was consulted for medical management.    Was also found to have hyponatremia that is improving.  Plan for open reduction and internal fixation revision knee fusion today.  Patient ripped his IV lines last night and also refusing lab work.August 28 it patient had left knee arthrodesis revision and left knee external fixator adjustment  Orthopedic recs are to continue ex fix for 8 weeks and then removal, then begin progressive weight-bearing at that time.    S/p IM Haldol dose 100mg q monthly on 9/6        Interval Hx:  Patient seen and examined this morning no complaint  No overnight events  Unfortunately patient was denied by insurance for rehab or skilled nursing facility.  Change IV antibiotics to p.o. Keflex 500 mg q.6 hours hourly.  To complete a 10 day course        Objective/physical exam:  General: In no acute distress, afebrile  Chest: Clear to auscultation bilaterally  Heart: RRR, +S1, S2, no appreciable murmur  Abdomen: Soft, nontender, BS +  Neurologic: Alert and oriented   Left leg external fixation in place no obvious drainage     Assessment/Plan:   Possible pin site infection  Acute watery diarrhea  Headaches  Rash- itchy, improving   Hypovolemic Hyponatremia   Schizophrenia, unspecified  Essential HTN  Open Distal Left Femur Fracture s/p I&D and ORIF 8/24      plan  Unfortunately patient was denied by insurance for rehab or skilled nursing facility.  Change IV antibiotics to p.o. Keflex 500 mg q.6 hours  hourly.  To complete a 10 day course   Add lactobacillus  begin imodium, d/c laxatives   add on fioricet prn  Add hydrocortisone cream  Patient is status post left knee arthrodesis revision on August 28 and left knee external fixator adjustment  Ortho wants the patient to be nonweightbearing for approximately 8 weeks followed by ex fix removal  Continue with PT and OT      prophylaxis Lovenox     Patient condition:  Stable     Anticipated discharge and Disposition:  Unfortunately patient was denied by insurance for rehab or skilled nursing facility.                    VITAL SIGNS: 24 HRS MIN & MAX LAST   Temp  Min: 97.7 °F (36.5 °C)  Max: 98.5 °F (36.9 °C) 98.1 °F (36.7 °C)   BP  Min: 93/55  Max: 122/67 119/67   Pulse  Min: 76  Max: 88  76   Resp  Min: 16  Max: 20 18   SpO2  Min: 96 %  Max: 100 % 98 %       Recent Labs   Lab 09/02/22  0622   WBC 11.1   RBC 3.14*   HGB 9.2*   HCT 30.2*   MCV 96.2*   MCH 29.3   MCHC 30.5*   RDW 14.3      MPV 9.0       Recent Labs   Lab 09/02/22  0622      K 4.9   CO2 27   BUN 9.3   CREATININE 0.77   CALCIUM 8.8          Microbiology Results (last 7 days)       ** No results found for the last 168 hours. **             See below for Radiology    Scheduled Med:   amantadine HCL  100 mg Oral BID    benztropine  2 mg Oral Daily    cephALEXin  500 mg Oral Q6H    enoxaparin  40 mg Subcutaneous Daily    haloperidoL  5 mg Oral Daily before evening meal    hydrocortisone   Topical (Top) TID    Lactobacillus acidophilus  1 capsule Oral TID WM    pantoprazole  40 mg Oral BID AC        Continuous Infusions:       PRN Meds:  acetaminophen, bisacodyL, butalbital-acetaminophen-caffeine -40 mg, hydrALAZINE, HYDROcodone-acetaminophen, HYDROmorphone, loperamide, magnesium hydroxide 400 mg/5 ml, morphine, ondansetron, ondansetron, sodium chloride 0.9%, sodium chloride 0.9%, sodium chloride 0.9%             VTE prophylaxis:     Patient condition:  Stable/Fair/Guarded/ Serious/  Critical    Anticipated discharge and Disposition:         All diagnosis and differential diagnosis have been reviewed; assessment and plan has been documented; I have personally reviewed the labs and test results that are presently available; I have reviewed the patients medication list; I have reviewed the consulting providers response and recommendations. I have reviewed or attempted to review medical records based upon their availability    All of the patient's questions have been  addressed and answered. Patient's is agreeable to the above stated plan. I will continue to monitor closely and make adjustments to medical management as needed.  _____________________________________________________________________    Nutrition Status:    Radiology:  SURG FL Surgery Fluoro Usage  See OP Notes for results.     IMPRESSION: See OP Notes for results.     This procedure was auto-finalized by: Virtual Radiologist      Nereida Perdomo MD   09/08/2022                  Home Suture Removal Text: Patient was provided instructions on removing sutures and will remove their sutures at home.  If they have any questions or difficulties they will call the office.

## 2022-09-08 NOTE — PT/OT/SLP PROGRESS
Physical Therapy      Patient Name:  Brandyn Ty   MRN:  [id number]  Attempted to see pt again in PM,  Patient not seen today secondary to Patient unwilling to participate. Pt found laying in bed r/f therapy due to being tired and needing rest, with encouragement given, however pt still r/f, stating he will participate tomorrow.Will follow-up 09/09.

## 2022-09-08 NOTE — PT/OT/SLP PROGRESS
Occupational Therapy  Treatment    Brandyn Ty   MRN: [id number]   Admitting Diagnosis: Displaced fracture of lateral condyle of left femur, initial encounter for open fracture type IIIA, IIIB, or IIIC    OT Date of Treatment: 09/08/22   OT Start Time: 1352  OT Stop Time: 1402  OT Total Time (min): 10 min     Billable Minutes:  Self Care/Home Management 1 unit  Total Minutes: 10 minutes      OT/IVANNA: OT     IVANNA Visit Number: 5    General Precautions: Standard, fall  Orthopedic Precautions: LLE non weight bearing  Braces: N/A    Spiritual, Cultural Beliefs, Quaker Practices, Values that Affect Care: no    Subjective:  Communicated with RN prior to session.    Pain/Comfort  Pain Rating 1: 0/10    Objective:  Patient found with: external fixator    Functional Mobility:  Bed Mobility:   Supine to sit: Standby Assistance   Sit to supine: Standby Assistance   Rolling: Activity did not occur   Scooting: Standby Assistance    Transfer Training:   Sit to stand:Contact Guard Assistance with Rolling Walker    Toilet Transfer:  Pt hop to with Contact Guard Assistance with Rolling Walker        Grooming:  Patient peformed hand washing with Contact Guard Assistance at standing at sink.    Toilet Training:  Pt performed toileting with Stand-by Assistance with Grab  bar at Commode.      Additional Treatment:    Patient left supine with call button in reach    ASSESSMENT:    Rehab potential is good    Activity tolerance: Good    Discharge recommendations: rehabilitation facility     Equipment recommendations: walker, rolling     GOALS:   Multidisciplinary Problems       Occupational Therapy Goals          Problem: Occupational Therapy    Goal Priority Disciplines Outcome Interventions   Occupational Therapy Goal     OT, PT/OT Ongoing, Progressing    Description: Goals to be met by: 9/8    Patient will increase functional independence with ADLs by performing:    LE Dressing with Modified [place].  Grooming while  standing at sink with Modified [place].  Toileting from toilet with Modified [place] for hygiene and clothing management.   Toilet transfer to toilet with Modified [place].                         Plan:  Patient to be seen 5 x/week, daily to address the above listed problems via self-care/home management, therapeutic exercises, therapeutic activities  Plan of Care expires: 09/12/22  Plan of Care reviewed with: patient         09/08/2022

## 2022-09-08 NOTE — PLAN OF CARE
Referral sent to facilities in the [place] area, per patient's request. Awaiting response. Locet completed. Awaiting letter of consideration to continue process for level 2 142.

## 2022-09-08 NOTE — PLAN OF CARE
Problem: Fall Injury Risk  Goal: Absence of Fall and Fall-Related Injury  Outcome: Ongoing, Progressing     Problem: Skin Injury Risk Increased  Goal: Skin Health and Integrity  Outcome: Ongoing, Progressing     Problem: Adult Inpatient Plan of Care  Goal: Patient-Specific Goal (Individualized)  Outcome: Ongoing, Progressing

## 2022-09-09 PROCEDURE — 11000001 HC ACUTE MED/SURG PRIVATE ROOM

## 2022-09-09 PROCEDURE — 25000003 PHARM REV CODE 250

## 2022-09-09 PROCEDURE — 97116 GAIT TRAINING THERAPY: CPT | Mod: CQ

## 2022-09-09 PROCEDURE — 25000003 PHARM REV CODE 250: Performed by: NURSE PRACTITIONER

## 2022-09-09 PROCEDURE — 97168 OT RE-EVAL EST PLAN CARE: CPT

## 2022-09-09 PROCEDURE — 25000003 PHARM REV CODE 250: Performed by: INTERNAL MEDICINE

## 2022-09-09 RX ADMIN — CEPHALEXIN 500 MG: 500 CAPSULE ORAL at 05:09

## 2022-09-09 RX ADMIN — HYDROCODONE BITARTRATE AND ACETAMINOPHEN 1 TABLET: 5; 325 TABLET ORAL at 08:09

## 2022-09-09 RX ADMIN — CEPHALEXIN 500 MG: 500 CAPSULE ORAL at 11:09

## 2022-09-09 RX ADMIN — HYDROCODONE BITARTRATE AND ACETAMINOPHEN 1 TABLET: 5; 325 TABLET ORAL at 09:09

## 2022-09-09 RX ADMIN — PANTOPRAZOLE SODIUM 40 MG: 40 TABLET, DELAYED RELEASE ORAL at 04:09

## 2022-09-09 RX ADMIN — HALOPERIDOL 5 MG: 5 TABLET ORAL at 04:09

## 2022-09-09 RX ADMIN — HYDROCODONE BITARTRATE AND ACETAMINOPHEN 1 TABLET: 5; 325 TABLET ORAL at 03:09

## 2022-09-09 RX ADMIN — PANTOPRAZOLE SODIUM 40 MG: 40 TABLET, DELAYED RELEASE ORAL at 05:09

## 2022-09-09 RX ADMIN — HYDROCODONE BITARTRATE AND ACETAMINOPHEN 1 TABLET: 5; 325 TABLET ORAL at 12:09

## 2022-09-09 RX ADMIN — HYDROCODONE BITARTRATE AND ACETAMINOPHEN 1 TABLET: 5; 325 TABLET ORAL at 04:09

## 2022-09-09 RX ADMIN — CEPHALEXIN 500 MG: 500 CAPSULE ORAL at 12:09

## 2022-09-09 NOTE — PT/OT/SLP PROGRESS
"Physical Therapy         Treatment        Brandyn Ty   MRN: [id number]        PT Start Time: 0851     PT Stop Time: 0904    PT Total Time (min): 13 min       Billable Minutes:  Gait [id number]  Total Minutes: 13    Treatment Type: Treatment  PT/PTA: PTA     PTA Visit Number: 2       General Precautions: Standard,    Orthopedic Precautions: Orthopedic Precautions : LLE non weight bearing   Braces: Braces: N/A    Spiritual, Cultural Beliefs, Orthodoxy Practices, Values that Affect Care: no    Subjective:  Communicated with nurse prior to session.  Upon arrival pt stated, "therapy time, I am ready to boogie!!"  Pain/Comfort  Pain Rating 1: 0/10    Objective:  Patient found laying in bed upon arrival    Functional Mobility:  Bed Mobility:   Supine to sit: Standby Assistance- HOB elevated   Sit to supine: Activity did not occur   Rolling: Activity did not occur   Scooting: Standby Assistance    Balance:   Static Sit: FAIR+: Able to take MINIMAL challenges from all directions  Dynamic Sit:  0: N/A  Static Stand: FAIR+: Takes MINIMAL challenges from all directions  Dynamic stand: 0: N/A    Transfer Training:  Sit to stand:Stand-by Assistance with Rolling Walker sit to stand from EOB, with pt maintaining NWB LLE    Gait Training:  Patient gait trained NWB: left lower extremity 70  feet on level tile with Rolling Walker with Contact Guard Assistance.  Pt with demonstarting a  swing-to gait with decreased pj and decreased step length.Impairments contributing to gait deviations include decreased ROM, no LOB noted    Additional Treatment:  Semi supine, BLE 10x AROM  SLR, hip abd/add, ankle pumps  RLE- SAQ, heel slides    Activity Tolerance:  Patient tolerated treatment well    Patient left up in chair with call button in reach and CNA present.    Assessment:  Brandyn Ty is a 62 y.o. male with a medical diagnosis of Displaced fracture of lateral condyle of left femur, initial encounter for open " fracture type IIIA, IIIB, or IIIC. Pt motivated to work with therapy today, was able to ambulate further and allowed PTA to range LLE today    Rehab potential is good.    Activity tolerance: Good    Discharge recommendations: Discharge Facility/Level of Care Needs: rehabilitation facility, nursing facility, skilled     Equipment recommendations: Equipment Needed After Discharge: walker, rolling     GOALS:   Multidisciplinary Problems       Physical Therapy Goals          Problem: Physical Therapy    Goal Priority Disciplines Outcome Goal Variances Interventions   Physical Therapy Goal     PT, PT/OT Ongoing, Progressing     Description: Goals to be met by: 2022     Patient will increase functional independence with mobility by performin. Supine to sit with Modified [place]  2. Sit to supine with Modified [place]  3. Sit to stand transfer with Mod I   4. Gait  x 200 feet with Modified [place] using Rolling Walker.                          PLAN:    Patient to be seen daily  to address the above listed problems via gait training, therapeutic activities, therapeutic exercises  Plan of Care expires: 10/01/22  Plan of Care reviewed with: patient         2022

## 2022-09-09 NOTE — PT/OT/SLP RE-EVAL
Occupational Therapy   Re-evaluation    Name: Brandyn Ty  MRN: [id number]  Admitting Diagnosis:  Displaced fracture of lateral condyle of left femur, initial encounter for open fracture type IIIA, IIIB, or IIIC  Recent Surgery: Procedure(s) (LRB):  arthrodesis  (Left) 12 Days Post-Op    Recommendations:     Discharge Recommendations: rehabilitation facility, nursing facility, skilled  Discharge Equipment Recommendations:  bedside commode, hip kit, walker, rolling, other (see comments) (ttb)  Barriers to discharge:  Other (Comment) (Fall risk, severity of deficits)    Assessment:     Brandyn Ty is a 62 y.o. male with a medical diagnosis of Displaced fracture of lateral condyle of left femur, initial encounter for open fracture type IIIA, IIIB, or IIIC.  Performance deficits affecting function are weakness, impaired functional mobility, impaired endurance, gait instability, pain, impaired balance, impaired self care skills, orthopedic precautions.  Pt able to complete ADLs and mobility with Chris/SBA, but is still at high risk for falls 2/2 orthopedic pxns and decreased balance. Pt would benefit from rehab vs SNF placement at d/c.     Rehab Prognosis:  Good; patient would benefit from acute skilled OT services to address these deficits and reach maximum level of function.       Plan:     Patient to be seen 5 x/week, daily to address the above listed problems via self-care/home management, therapeutic activities, therapeutic exercises  Plan of Care Expires: 09/23/22  Plan of Care Reviewed with: patient    Subjective     Chief Complaint: None   Patient/Family stated goals: To return to PLOF   Communicated with: RN prior to session.  Pain/Comfort:  Pain Rating 1: 0/10    Objective:     Communicated with: RN prior to session.  Patient found up in chair with: external fixator upon OT entry to room.    General Precautions: Standard, fall   Orthopedic Precautions:LLE non weight bearing   Braces:  N/A  Respiratory Status: Room air    Occupational Performance:    Functional Mobility/Transfers:  Patient completed Sit <> Stand Transfer with contact guard assistance  with  rolling walker   Patient completed Toilet Transfer hop to technique with contact guard assistance with  rolling walker  Functional Mobility: Pt ambulated to toilet in bathroom using RW c CGA for safety    Activities of Daily Living:  Grooming: contact guard assistance CGA for standing balance while brushing teeth at sink  Lower Body Dressing: moderate assistance Doff/don socks   Toileting: stand by assistance pericare     Cognitive/Visual Perceptual:  Cognitive/Psychosocial Skills:     -       Mood/Affect/Coping skills/emotional control: Appropriate to situation and Cooperative    Physical Exam:  Upper Extremity Strength:    -       Right Upper Extremity: WNL  -       Left Upper Extremity: WNL    Treatment & Education:    Patient left up in chair with call button in reach    GOALS:   Multidisciplinary Problems       Occupational Therapy Goals          Problem: Occupational Therapy    Goal Priority Disciplines Outcome Interventions   Occupational Therapy Goal     OT, PT/OT Ongoing, Progressing    Description: Goals to be met by: 9/8    Patient will increase functional independence with ADLs by performing:    LE Dressing with Modified [place].  Grooming while standing at sink with Modified [place].  Toileting from toilet with Modified [place] for hygiene and clothing management.   Toilet transfer to toilet with Modified [place].                         History:     Past Medical History:   Diagnosis Date    Anxiety     Hypertension          Past Surgical History:   Procedure Laterality Date    APPLICATION OF LARGE EXTERNAL FIXATION DEVICE TO TIBIA Left 8/24/2022    Procedure: APPLICATION, EXTERNAL FIXATION DEVICE, LARGE, TIBIA;  Surgeon: Shawn Chatterjee DO;  Location: [facility];  Service: Orthopedics;  Laterality: Left;       Time  Tracking:     OT Date of Treatment: 09/09/22  OT Start Time: 0958  OT Stop Time: 1013  OT Total Time (min): 15 min    Billable Minutes:Re-gauraval 1 unit    9/9/2022

## 2022-09-09 NOTE — PROGRESS NOTES
[facility] Neuro  Orthopedics  Progress Note    Patient Name: Brandyn Ty  MRN: [id number]  Admission Date: 8/24/2022  Hospital Length of Stay: 16 days  Attending Provider: Alivia Darling MD  Primary Care Provider: Elizabeth Marin MD  Follow-up For: Procedure(s) (LRB):  arthrodesis  (Left)    Post-Operative Day: 12 Day Post-Op  Subjective:     Principal Problem:Displaced fracture of lateral condyle of left femur, initial encounter for open fracture type IIIA, IIIB, or IIIC    Principal Orthopedic Problem: 12 Days Post-Op left revision knee arthrodesis    Interval History: Patient doing well this morning.  Resting comfortably in bed. States symptoms of redness and pain improving around his proximal ex-fix pins. No acute complaints this morning.       Review of patient's allergies indicates:   Allergen Reactions    Chlorpromazine      Other reaction(s): Chlorpromazine, Unknown - See comments    Fluphenazine      Other reaction(s): Fluphenazine, Unknown - See comments    Trazodone      Other reaction(s): Trazodone, Unknown - See comments       Current Facility-Administered Medications   Medication    acetaminophen tablet 650 mg    bisacodyL suppository 10 mg    butalbital-acetaminophen-caffeine -40 mg per tablet 1 tablet    cephALEXin capsule 500 mg    enoxaparin injection 40 mg    haloperidoL tablet 5 mg    hydrALAZINE injection 10 mg    HYDROcodone-acetaminophen 5-325 mg per tablet 1 tablet    HYDROmorphone (PF) injection 0.4 mg    loperamide capsule 2 mg    magnesium hydroxide 400 mg/5 ml suspension 2,400 mg    morphine injection 4 mg    ondansetron injection 4 mg    ondansetron injection 4 mg    pantoprazole EC tablet 40 mg    sodium chloride 0.9% flush 10 mL    sodium chloride 0.9% flush 10 mL    sodium chloride 0.9% flush 10 mL     Objective:     Vital Signs (Most Recent):  Temp: 98.1 °F (36.7 °C) (09/09/22 0713)  Pulse: 90 (09/09/22 0713)  Resp: 16 (09/09/22 0809)  BP:  "119/70 (09/09/22 0713)  SpO2: 97 % (09/09/22 0713) Vital Signs (24h Range):  Temp:  [97.8 °F (36.6 °C)-98.2 °F (36.8 °C)] 98.1 °F (36.7 °C)  Pulse:  [76-96] 90  Resp:  [16-20] 16  SpO2:  [95 %-98 %] 97 %  BP: (119-164)/(67-84) 119/70     Weight: 83.9 kg (185 lb)  Height: 5' 10" (177.8 cm)  Body mass index is 26.54 kg/m².      Intake/Output Summary (Last 24 hours) at 9/9/2022 0920  Last data filed at 9/9/2022 0500  Gross per 24 hour   Intake 720 ml   Output 3001 ml   Net -2281 ml         Physical Exam:   Musculoskeletal:     Left lower extremity: ex fix to the LLE remains in place.Proximal pin site with minimal serous drainage, no expressible purulence. Soft dressing in place to the left knee and lower leg without obvious signs of drainage; erythema surrounding the proximal thigh mostly resolved as compared to previous exam; compartments are soft and compressible; No pain with ROM at the ankle or hip. No range of motion at the knee; moderate tenderness to palpation; dorsi/plantar flexes the foot; SILT distally; BCR distally; DP pulse palpable      Diagnostic Findings:   Significant Labs:   Recent Lab Results       None             Significant Imaging: I have reviewed and interpreted all pertinent imaging results/findings.     Assessment/Plan:     Active Diagnoses:    Diagnosis Date Noted POA    PRINCIPAL PROBLEM:  Displaced fracture of lateral condyle of left femur, initial encounter for open fracture type IIIA, IIIB, or IIIC [S72.422C] 08/24/2022 Yes    Type III open displaced fracture of medial condyle of left tibia with routine healing [S82.132F] 08/24/2022 Not Applicable      Problems Resolved During this Admission:     Currently on Oral keflex  Erythema improving with no worsening signs of infection. No purulence or increased drainage from pin sites.   Remain NWB to the LLE. No range of motion to the left knee. Ex fix to remain in place x 8 weeks followed by removal and progressive weight bearing at that time. "   Lovenox for DVT ppx during stay.   Continue with oral antibiotics as comfortable  Sutures/staples out to the left knee in approx 9 days.   Follow up with Dr. Chatterjee in 2-3 weeks for repeat x-rays and continued management.     The above findings, diagnostics, and treatment plan were discussed with Dr. Chatterjee who is in agreement with the plan of care except as stated in additional documentation.       Obdulia Pereyra PA-C  [facility]   Orthopedic Trauma

## 2022-09-09 NOTE — PROGRESS NOTES
[facility] Hospital Medicine Progress Note      Chief Complaint: Inpatient Follow-up for consult for med mgmt     HPI:   63 yo AAM with hx Schizophrenia and HTN who presents to the ED after a ground level fall landing on his left knee. He is an extremely poor historian. He sustained a displaced open femur fracture grade 3A vs B.  that warranted immediate surgical intervention. Dr. Chatterjee was consulted and pt underwent I&D and external fixation. The Hospitalist Service was consulted for medical management.    Was also found to have hyponatremia that is improving.  Plan for open reduction and internal fixation revision knee fusion today.  Patient ripped his IV lines last night and also refusing lab work.August 28 it patient had left knee arthrodesis revision and left knee external fixator adjustment  Orthopedic recs are to continue ex fix for 8 weeks and then removal, then begin progressive weight-bearing at that time.    S/p IM Haldol dose 100mg q monthly on 9/6  Unfortunately patient was denied by insurance for rehab or skilled nursing facility.  Change IV antibiotics to p.o. Keflex 500 mg q.6 hours hourly.  To complete a 10 day course            Interval Hx:  Patient seen and examined this morning no complaint  No overnight events       Objective/physical exam:  General: In no acute distress, afebrile  Chest: Clear to auscultation bilaterally  Heart: RRR, +S1, S2, no appreciable murmur  Abdomen: Soft, nontender, BS +  Neurologic: Alert and oriented   Left leg external fixation in place no obvious drainage     Assessment/Plan:   Possible pin site infection  Acute watery diarrhea, resolved  Headaches  Rash- itchy, improving   Hypovolemic Hyponatremia   Schizophrenia, unspecified  Essential HTN  Open Distal Left Femur Fracture s/p I&D and ORIF 8/24      plan  Unfortunately patient was denied by insurance for rehab or skilled nursing facility.  p.o. Keflex 500 mg q.6 hours hourly.  To  complete a 10 day course , day 2/10  Add lactobacillus  begin imodium, d/c laxatives   add on fioricet prn  Add hydrocortisone cream  Patient is status post left knee arthrodesis revision on August 28 and left knee external fixator adjustment  Ortho wants the patient to be nonweightbearing for approximately 8 weeks followed by ex fix removal  Continue with PT and OT      prophylaxis Lovenox     Patient condition:  Stable     Anticipated discharge and Disposition:  Unfortunately patient was denied by insurance for rehab or skilled nursing facility.    VITAL SIGNS: 24 HRS MIN & MAX LAST   Temp  Min: 97.8 °F (36.6 °C)  Max: 98.3 °F (36.8 °C) 98.3 °F (36.8 °C)   BP  Min: 104/63  Max: 164/84 104/63   Pulse  Min: 76  Max: 96  93   Resp  Min: 16  Max: 20 17   SpO2  Min: 95 %  Max: 98 % 97 %       No results for input(s): WBC, RBC, HGB, HCT, MCV, MCH, MCHC, RDW, PLT, MPV, GRAN, LYMPH, MONO, BASO, NRBC in the last 168 hours.    No results for input(s): NA, K, CL, CO2, ANIONGAP, BUN, CREATININE, GLU, CALCIUM, PH, MG, ALBUMIN, PROT, ALKPHOS, ALT, AST, BILITOT in the last 168 hours.       Microbiology Results (last 7 days)       ** No results found for the last 168 hours. **             See below for Radiology    Scheduled Med:   cephALEXin  500 mg Oral Q6H    enoxaparin  40 mg Subcutaneous Daily    haloperidoL  5 mg Oral Daily before evening meal    pantoprazole  40 mg Oral BID AC        Continuous Infusions:       PRN Meds:  acetaminophen, bisacodyL, butalbital-acetaminophen-caffeine -40 mg, hydrALAZINE, HYDROcodone-acetaminophen, HYDROmorphone, loperamide, magnesium hydroxide 400 mg/5 ml, morphine, ondansetron, ondansetron, sodium chloride 0.9%, sodium chloride 0.9%, sodium chloride 0.9%         VTE prophylaxis:     Patient condition:  Stable/Fair/Guarded/ Serious/ Critical    Anticipated discharge and Disposition:         All diagnosis and differential diagnosis have been reviewed; assessment and plan has been  documented; I have personally reviewed the labs and test results that are presently available; I have reviewed the patients medication list; I have reviewed the consulting providers response and recommendations. I have reviewed or attempted to review medical records based upon their availability    All of the patient's questions have been  addressed and answered. Patient's is agreeable to the above stated plan. I will continue to monitor closely and make adjustments to medical management as needed.  _____________________________________________________________________    Nutrition Status:    Radiology:  SURG FL Surgery Fluoro Usage  See OP Notes for results.     IMPRESSION: See OP Notes for results.     This procedure was auto-finalized by: Virtual Radiologist      Nereida Perdomo MD   09/09/2022

## 2022-09-10 PROCEDURE — 25000003 PHARM REV CODE 250: Performed by: NURSE PRACTITIONER

## 2022-09-10 PROCEDURE — 97110 THERAPEUTIC EXERCISES: CPT | Mod: CQ

## 2022-09-10 PROCEDURE — 25000003 PHARM REV CODE 250

## 2022-09-10 PROCEDURE — 25000003 PHARM REV CODE 250: Performed by: INTERNAL MEDICINE

## 2022-09-10 PROCEDURE — 11000001 HC ACUTE MED/SURG PRIVATE ROOM

## 2022-09-10 RX ORDER — CEPHALEXIN 500 MG/1
500 CAPSULE ORAL EVERY 6 HOURS
Status: DISPENSED | OUTPATIENT
Start: 2022-09-10 | End: 2022-09-16

## 2022-09-10 RX ADMIN — CEPHALEXIN 500 MG: 500 CAPSULE ORAL at 11:09

## 2022-09-10 RX ADMIN — HYDROCODONE BITARTRATE AND ACETAMINOPHEN 1 TABLET: 5; 325 TABLET ORAL at 04:09

## 2022-09-10 RX ADMIN — HALOPERIDOL 5 MG: 5 TABLET ORAL at 04:09

## 2022-09-10 RX ADMIN — PANTOPRAZOLE SODIUM 40 MG: 40 TABLET, DELAYED RELEASE ORAL at 05:09

## 2022-09-10 RX ADMIN — PANTOPRAZOLE SODIUM 40 MG: 40 TABLET, DELAYED RELEASE ORAL at 04:09

## 2022-09-10 RX ADMIN — HYDROCODONE BITARTRATE AND ACETAMINOPHEN 1 TABLET: 5; 325 TABLET ORAL at 08:09

## 2022-09-10 RX ADMIN — CEPHALEXIN 500 MG: 500 CAPSULE ORAL at 05:09

## 2022-09-10 RX ADMIN — CEPHALEXIN 500 MG: 500 CAPSULE ORAL at 04:09

## 2022-09-10 NOTE — PLAN OF CARE
"  Problem: Adult Inpatient Plan of Care  Goal: Plan of Care Review  Outcome: Ongoing, Progressing  Goal: Patient-Specific Goal (Individualized)  Outcome: Ongoing, Progressing  Flowsheets (Taken 9/9/2022 6201)  Anxieties, Fears or Concerns: "i WILL ASK QUESTIONS"  Individualized Care Needs: "I WILL CALL BEFORE I GET UP  Patient-Specific Goals (Include Timeframe): I WONT GET AN INFECTION  Goal: Absence of Hospital-Acquired Illness or Injury  Outcome: Ongoing, Progressing  Goal: Optimal Comfort and Wellbeing  Outcome: Ongoing, Progressing  Goal: Readiness for Transition of Care  Outcome: Ongoing, Progressing     "

## 2022-09-10 NOTE — PT/OT/SLP PROGRESS
Physical Therapy         Treatment        Brandyn Ty   MRN: [id number]        PT Start Time: 1135     PT Stop Time: 1144    PT Total Time (min): 9 min       Billable Minutes:  Therapeutic Exercise 9  Total Minutes: 9     Treatment Type: Treatment  PT/PTA: PTA     PTA Visit Number: 3       General Precautions: Standard,    Orthopedic Precautions: Orthopedic Precautions : LLE non weight bearing   Braces: Braces: N/A    Spiritual, Cultural Beliefs, [place]din Practices, Values that Affect Care: no    Subjective:  Communicated with nurse prior to session.  Pt r/f to gait at this time due to feeling too tired, with education given on importance of working with PTA, in which pt agreed to perform therex in bed  Pain/Comfort  Pain Rating 1: 10/10  Location - Side 1: Left  Location 1: leg  Pain Addressed 1: Reposition, Distraction    Objective:  Patient found laying in bed upon arrival    Functional Mobility:  Bed Mobility:   Supine to sit: Activity did not occur   Sit to supine: Activity did not occur   Rolling: Activity did not occur   Scooting: Standby Assistance- pt was able to pull self up toward HOB    Additional Treatment:  Supine therex, 2 sets/ 10x   BLE- hip abd/add, SLR, ankle pumps   RLE- heel slides, SAQ    Activity Tolerance:  Patient tolerated treatment well and limited due to feeling too tired, due to not sleeping last night    Patient left HOB elevated with call button in reach.    Assessment:  Brandyn Ty is a 62 y.o. male with a medical diagnosis of Displaced fracture of lateral condyle of left femur, initial encounter for open fracture type IIIA, IIIB, or IIIC. Pt r/f to get OOB at this time but in agreement to perform bed therex in order to get BLE stronger    Rehab potential is good.    Activity tolerance: Fair    Discharge recommendations: Discharge Facility/Level of Care Needs: nursing facility, skilled, other (see comments) (vs swing bed)     Equipment recommendations: Equipment  Needed After Discharge: walker, rolling     GOALS:   Multidisciplinary Problems       Physical Therapy Goals          Problem: Physical Therapy    Goal Priority Disciplines Outcome Goal Variances Interventions   Physical Therapy Goal     PT, PT/OT Ongoing, Progressing     Description: Goals to be met by: 2022     Patient will increase functional independence with mobility by performin. Supine to sit with Modified [place]  2. Sit to supine with Modified [place]  3. Sit to stand transfer with Mod I   4. Gait  x 200 feet with Modified [place] using Rolling Walker.                          PLAN:    Patient to be seen daily  to address the above listed problems via therapeutic exercises  Plan of Care expires: 10/01/22  Plan of Care reviewed with: patient         9/10/2022

## 2022-09-10 NOTE — PROGRESS NOTES
[facility] Hospital Medicine Progress Note      Chief Complaint: Inpatient Follow-up for consult for med mgmt     HPI:   63 yo AAM with hx Schizophrenia and HTN who presents to the ED after a ground level fall landing on his left knee. He is an extremely poor historian. He sustained a displaced open femur fracture grade 3A vs B.  that warranted immediate surgical intervention. Dr. Chatterjee was consulted and pt underwent I&D and external fixation. The Hospitalist Service was consulted for medical management.    Was also found to have hyponatremia that is improving.  Plan for open reduction and internal fixation revision knee fusion today.  Patient ripped his IV lines last night and also refusing lab work.August 28 it patient had left knee arthrodesis revision and left knee external fixator adjustment  Orthopedic recs are to continue ex fix for 8 weeks and then removal, then begin progressive weight-bearing at that time.    S/p IM Haldol dose 100mg q monthly on 9/6  Unfortunately patient was denied by insurance for rehab or skilled nursing facility.  Change IV antibiotics to p.o. Keflex 500 mg q.6 hours hourly.  To complete a 10 day course            Interval Hx:  Patient seen and examined this morning no complaint  No overnight events   continue keflex      Objective/physical exam:  General: In no acute distress, afebrile  Chest: Clear to auscultation bilaterally  Heart: RRR, +S1, S2, no appreciable murmur  Abdomen: Soft, nontender, BS +  Neurologic: Alert and oriented   Left leg external fixation in place no obvious drainage     Assessment/Plan:   Possible pin site infection  Acute watery diarrhea, resolved  Headaches  Rash- itchy, improving   Hypovolemic Hyponatremia   Schizophrenia, unspecified  Essential HTN  Open Distal Left Femur Fracture s/p I&D and ORIF 8/24      plan  Unfortunately patient was denied by insurance for rehab or skilled nursing facility.  p.o. Keflex 500 mg q.6 hours  hourly.  To complete a 10 day course , day 3/10  Add lactobacillus  begin imodium, d/c laxatives   add on fioricet prn  Add hydrocortisone cream  Patient is status post left knee arthrodesis revision on August 28 and left knee external fixator adjustment  Ortho wants the patient to be nonweightbearing for approximately 8 weeks followed by ex fix removal  Continue with PT and OT      prophylaxis Lovenox     Patient condition:  Stable     Anticipated discharge and Disposition:  Unfortunately patient was denied by insurance for rehab or skilled nursing facility.        VITAL SIGNS: 24 HRS MIN & MAX LAST   Temp  Min: 97.9 °F (36.6 °C)  Max: 98.7 °F (37.1 °C) 98.7 °F (37.1 °C)   BP  Min: 124/78  Max: 132/80 126/78   Pulse  Min: 74  Max: 110  110   Resp  Min: 15  Max: 20 18   SpO2  Min: 95 %  Max: 97 % 95 %       No results for input(s): WBC, RBC, HGB, HCT, MCV, MCH, MCHC, RDW, PLT, MPV, GRAN, LYMPH, MONO, BASO, NRBC in the last 168 hours.    No results for input(s): NA, K, CL, CO2, ANIONGAP, BUN, CREATININE, GLU, CALCIUM, PH, MG, ALBUMIN, PROT, ALKPHOS, ALT, AST, BILITOT in the last 168 hours.       Microbiology Results (last 7 days)       ** No results found for the last 168 hours. **             See below for Radiology    Scheduled Med:   cephALEXin  500 mg Oral Q6H    enoxaparin  40 mg Subcutaneous Daily    haloperidoL  5 mg Oral Daily before evening meal    pantoprazole  40 mg Oral BID AC        Continuous Infusions:       PRN Meds:  acetaminophen, bisacodyL, butalbital-acetaminophen-caffeine -40 mg, hydrALAZINE, HYDROcodone-acetaminophen, HYDROmorphone, loperamide, magnesium hydroxide 400 mg/5 ml, morphine, ondansetron, ondansetron, sodium chloride 0.9%, sodium chloride 0.9%, sodium chloride 0.9%       VTE prophylaxis:     Patient condition:  Stable/Fair/Guarded/ Serious/ Critical    Anticipated discharge and Disposition:         All diagnosis and differential diagnosis have been reviewed; assessment and plan  has been documented; I have personally reviewed the labs and test results that are presently available; I have reviewed the patients medication list; I have reviewed the consulting providers response and recommendations. I have reviewed or attempted to review medical records based upon their availability    All of the patient's questions have been  addressed and answered. Patient's is agreeable to the above stated plan. I will continue to monitor closely and make adjustments to medical management as needed.  _____________________________________________________________________    Nutrition Status:    Radiology:  SURG FL Surgery Fluoro Usage  See OP Notes for results.     IMPRESSION: See OP Notes for results.     This procedure was auto-finalized by: Virtual Radiologist      Nereida Perdomo MD   09/10/2022

## 2022-09-11 PROCEDURE — 25000003 PHARM REV CODE 250: Performed by: NURSE PRACTITIONER

## 2022-09-11 PROCEDURE — 25000003 PHARM REV CODE 250

## 2022-09-11 PROCEDURE — 11000001 HC ACUTE MED/SURG PRIVATE ROOM

## 2022-09-11 PROCEDURE — 25000003 PHARM REV CODE 250: Performed by: INTERNAL MEDICINE

## 2022-09-11 RX ADMIN — CEPHALEXIN 500 MG: 500 CAPSULE ORAL at 11:09

## 2022-09-11 RX ADMIN — CEPHALEXIN 500 MG: 500 CAPSULE ORAL at 05:09

## 2022-09-11 RX ADMIN — PANTOPRAZOLE SODIUM 40 MG: 40 TABLET, DELAYED RELEASE ORAL at 05:09

## 2022-09-11 RX ADMIN — HALOPERIDOL 5 MG: 5 TABLET ORAL at 05:09

## 2022-09-11 RX ADMIN — HYDROCODONE BITARTRATE AND ACETAMINOPHEN 1 TABLET: 5; 325 TABLET ORAL at 04:09

## 2022-09-11 RX ADMIN — HYDROCODONE BITARTRATE AND ACETAMINOPHEN 1 TABLET: 5; 325 TABLET ORAL at 05:09

## 2022-09-11 RX ADMIN — HYDROCODONE BITARTRATE AND ACETAMINOPHEN 1 TABLET: 5; 325 TABLET ORAL at 09:09

## 2022-09-11 RX ADMIN — HYDROCODONE BITARTRATE AND ACETAMINOPHEN 1 TABLET: 5; 325 TABLET ORAL at 01:09

## 2022-09-11 RX ADMIN — HYDROCODONE BITARTRATE AND ACETAMINOPHEN 1 TABLET: 5; 325 TABLET ORAL at 10:09

## 2022-09-11 NOTE — PROGRESS NOTES
[facility] Medicine Progress Note        Chief Complaint: Inpatient Follow-up for consult for med antoine     HPI:   61 yo AAM with hx Schizophrenia and HTN who presents to the ED after a ground level fall landing on his left knee. He is an extremely poor historian. He sustained a displaced open femur fracture grade 3A vs B.  that warranted immediate surgical intervention. Dr. Chatterjee was consulted and pt underwent I&D and external fixation. The Hospitalist Service was consulted for medical management.    Was also found to have hyponatremia that is improving.  Plan for open reduction and internal fixation revision knee fusion today.  Patient ripped his IV lines last night and also refusing lab work.August 28 it patient had left knee arthrodesis revision and left knee external fixator adjustment  Orthopedic recs are to continue ex fix for 8 weeks and then removal, then begin progressive weight-bearing at that time.    S/p IM Haldol dose 100mg q monthly on 9/6  Unfortunately patient was denied by insurance for rehab or skilled nursing facility.  Change IV antibiotics to p.o. Keflex 500 mg q.6 hours hourly.  To complete a 10 day course            Interval Hx:  Patient seen and examined this morning   C/o- we disturbing him with vitals check, he has no disease he says    continue keflex      Objective/physical exam:  General: In no acute distress, afebrile  Chest: Clear to auscultation bilaterally  Heart: RRR, +S1, S2, no appreciable murmur  Abdomen: Soft, nontender, BS +  Neurologic: Alert and oriented   Left leg external fixation in place no obvious drainage     Assessment/Plan:   Possible pin site infection  Acute watery diarrhea, resolved  Headaches  Rash- itchy, improving   Hypovolemic Hyponatremia   Schizophrenia, unspecified  Essential HTN  Open Distal Left Femur Fracture s/p I&D and ORIF 8/24      plan  Unfortunately patient was denied by insurance for rehab or skilled nursing  facility.  p.o. Keflex 500 mg q.6 hours hourly.  To complete a 10 day course , day 4/10  Add lactobacillus  begin imodium, d/c laxatives   add on fioricet prn  Add hydrocortisone cream  Patient is status post left knee arthrodesis revision on August 28 and left knee external fixator adjustment  Ortho wants the patient to be nonweightbearing for approximately 8 weeks followed by ex fix removal  Continue with PT and OT      prophylaxis Lovenox     Patient condition:  Stable     Anticipated discharge and Disposition:  Unfortunately patient was denied by insurance for rehab or skilled nursing facility.        VITAL SIGNS: 24 HRS MIN & MAX LAST   Temp  Min: 97.4 °F (36.3 °C)  Max: 98.7 °F (37.1 °C) 98.2 °F (36.8 °C)   BP  Min: 116/75  Max: 181/75 116/75   Pulse  Min: 79  Max: 88  88   Resp  Min: 15  Max: 20 18   SpO2  Min: 95 %  Max: 98 % 96 %       No results for input(s): WBC, RBC, HGB, HCT, MCV, MCH, MCHC, RDW, PLT, MPV, GRAN, LYMPH, MONO, BASO, NRBC in the last 168 hours.    No results for input(s): NA, K, CL, CO2, ANIONGAP, BUN, CREATININE, GLU, CALCIUM, PH, MG, ALBUMIN, PROT, ALKPHOS, ALT, AST, BILITOT in the last 168 hours.       Microbiology Results (last 7 days)       ** No results found for the last 168 hours. **             See below for Radiology    Scheduled Med:   cephALEXin  500 mg Oral Q6H    enoxaparin  40 mg Subcutaneous Daily    haloperidoL  5 mg Oral Daily before evening meal    pantoprazole  40 mg Oral BID AC        Continuous Infusions:       PRN Meds:  acetaminophen, bisacodyL, butalbital-acetaminophen-caffeine -40 mg, hydrALAZINE, HYDROcodone-acetaminophen, HYDROmorphone, loperamide, magnesium hydroxide 400 mg/5 ml, morphine, ondansetron, ondansetron, sodium chloride 0.9%, sodium chloride 0.9%, sodium chloride 0.9%       VTE prophylaxis:     Patient condition:  Stable/Fair/Guarded/ Serious/ Critical    Anticipated discharge and Disposition:         All diagnosis and differential diagnosis  have been reviewed; assessment and plan has been documented; I have personally reviewed the labs and test results that are presently available; I have reviewed the patients medication list; I have reviewed the consulting providers response and recommendations. I have reviewed or attempted to review medical records based upon their availability    All of the patient's questions have been  addressed and answered. Patient's is agreeable to the above stated plan. I will continue to monitor closely and make adjustments to medical management as needed.  _____________________________________________________________________    Nutrition Status:    Radiology:  SURG FL Surgery Fluoro Usage  See OP Notes for results.     IMPRESSION: See OP Notes for results.     This procedure was auto-finalized by: Virtual Radiologist      Nereida Perdomo MD   09/11/2022

## 2022-09-12 PROCEDURE — 97116 GAIT TRAINING THERAPY: CPT | Mod: CQ

## 2022-09-12 PROCEDURE — 25000003 PHARM REV CODE 250: Performed by: NURSE PRACTITIONER

## 2022-09-12 PROCEDURE — 97110 THERAPEUTIC EXERCISES: CPT | Mod: CQ

## 2022-09-12 PROCEDURE — 11000001 HC ACUTE MED/SURG PRIVATE ROOM

## 2022-09-12 PROCEDURE — 97535 SELF CARE MNGMENT TRAINING: CPT | Mod: CO

## 2022-09-12 PROCEDURE — 25000003 PHARM REV CODE 250: Performed by: INTERNAL MEDICINE

## 2022-09-12 PROCEDURE — 25000003 PHARM REV CODE 250

## 2022-09-12 RX ORDER — BENZTROPINE MESYLATE 1 MG/1
2 TABLET ORAL DAILY
Status: DISCONTINUED | OUTPATIENT
Start: 2022-09-12 | End: 2022-09-28 | Stop reason: HOSPADM

## 2022-09-12 RX ADMIN — CEPHALEXIN 500 MG: 500 CAPSULE ORAL at 12:09

## 2022-09-12 RX ADMIN — HYDROCODONE BITARTRATE AND ACETAMINOPHEN 1 TABLET: 5; 325 TABLET ORAL at 05:09

## 2022-09-12 RX ADMIN — CEPHALEXIN 500 MG: 500 CAPSULE ORAL at 06:09

## 2022-09-12 RX ADMIN — HALOPERIDOL 5 MG: 5 TABLET ORAL at 06:09

## 2022-09-12 RX ADMIN — HYDROCODONE BITARTRATE AND ACETAMINOPHEN 1 TABLET: 5; 325 TABLET ORAL at 10:09

## 2022-09-12 RX ADMIN — BUTALBITAL, ACETAMINOPHEN, AND CAFFEINE 1 TABLET: 50; 325; 40 TABLET ORAL at 06:09

## 2022-09-12 RX ADMIN — HYDROCODONE BITARTRATE AND ACETAMINOPHEN 1 TABLET: 5; 325 TABLET ORAL at 06:09

## 2022-09-12 RX ADMIN — LOPERAMIDE HYDROCHLORIDE 2 MG: 2 CAPSULE ORAL at 09:09

## 2022-09-12 RX ADMIN — PANTOPRAZOLE SODIUM 40 MG: 40 TABLET, DELAYED RELEASE ORAL at 05:09

## 2022-09-12 RX ADMIN — HYDROCODONE BITARTRATE AND ACETAMINOPHEN 1 TABLET: 5; 325 TABLET ORAL at 02:09

## 2022-09-12 RX ADMIN — BENZTROPINE MESYLATE 2 MG: 1 TABLET ORAL at 09:09

## 2022-09-12 RX ADMIN — CEPHALEXIN 500 MG: 500 CAPSULE ORAL at 11:09

## 2022-09-12 RX ADMIN — CEPHALEXIN 500 MG: 500 CAPSULE ORAL at 05:09

## 2022-09-12 NOTE — PT/OT/SLP PROGRESS
Occupational Therapy  Treatment    Brandyn Ty   MRN: [id number]   Admitting Diagnosis: Displaced fracture of lateral condyle of left femur, initial encounter for open fracture type IIIA, IIIB, or IIIC    OT Date of Treatment: 09/12/22   OT Start Time: 1412  OT Stop Time: 1420  OT Total Time (min): 8 min     Billable Minutes:  Self Care/Home Management 8  Total Minutes: 8     OT/IVANNA: IVANNA     IVANNA Visit Number: 1    General Precautions: Standard, fall  Orthopedic Precautions: LLE non weight bearing  Braces:      Spiritual, Cultural Beliefs, Jewish Practices, Values that Affect Care: no    Subjective:  Pt starting to nap, but agreeable to OT session.          Objective:  Patient found with: external fixator    Functional Mobility:  Bed Mobility:   Supine to sit: Standby Assistance   Sit to supine: Standby Assistance   Rolling: Activity did not occur   Scooting: Activity did not occur    Grooming:  Hand washing while standing at sink with CGA-SBA.     Toilet Training:  Void while seated. SBA.     Balance:   Static Sit: FAIR+: Able to take MINIMAL challenges from all directions  Dynamic Sit:  FAIR+: Maintains balance through MINIMAL excursions of active trunk motion  Static Stand: FAIR: Maintains without assist but unable to take challenges  Dynamic stand: FAIR: Needs CONTACT GUARD during gait    Additional Treatment:      Patient left HOB elevated with all lines intact and call button in reach    ASSESSMENT:  Brandyn Ty is a 62 y.o. male with a medical diagnosis of Displaced fracture of lateral condyle of left femur, initial encounter for open fracture type IIIA, IIIB, or IIIC.    Rehab potential is good    Activity tolerance: Good    Discharge recommendations: nursing facility, skilled     Equipment recommendations: walker, rolling     GOALS:   Multidisciplinary Problems       Occupational Therapy Goals          Problem: Occupational Therapy    Goal Priority Disciplines Outcome Interventions    Occupational Therapy Goal     OT, PT/OT Ongoing, Progressing    Description: Goals to be met by: 9/23    Patient will increase functional independence with ADLs by performing:    LE Dressing with Modified [place].  Grooming while standing at sink with Modified [place].  Toileting from toilet with Modified [place] for hygiene and clothing management.   Toilet transfer to toilet with Modified [place].                         Plan:  Patient to be seen 3 x/week, 5 x/week to address the above listed problems via self-care/home management, therapeutic activities, therapeutic exercises  Plan of Care expires: 09/23/22  Plan of Care reviewed with: patient         09/12/2022

## 2022-09-12 NOTE — PROGRESS NOTES
[facility] Hospital Medicine Progress Note      Chief Complaint: Inpatient Follow-up for consult for med antoine     HPI:   61 yo AAM with hx Schizophrenia and HTN who presents to the ED after a ground level fall landing on his left knee. He is an extremely poor historian. He sustained a displaced open femur fracture grade 3A vs B.  that warranted immediate surgical intervention. Dr. Chatterjee was consulted and pt underwent I&D and external fixation. The Hospitalist Service was consulted for medical management.    Was also found to have hyponatremia that is improving.  Plan for open reduction and internal fixation revision knee fusion today.  Patient ripped his IV lines last night and also refusing lab work.August 28 it patient had left knee arthrodesis revision and left knee external fixator adjustment  Orthopedic recs are to continue ex fix for 8 weeks and then removal, then begin progressive weight-bearing at that time.    S/p IM Haldol dose 100mg q monthly on 9/6  Unfortunately patient was denied by insurance for rehab or skilled nursing facility.  Change IV antibiotics to p.o. Keflex 500 mg q.6 hours hourly.  To complete a 10 day course            Interval Hx:  Patient seen and examined this morning   Patient having watery diarrhea.    Discontinue laxatives and begin Imodium as needed after every loose stool.     Objective/physical exam:  General: In no acute distress, afebrile  Chest: Clear to auscultation bilaterally  Heart: RRR, +S1, S2, no appreciable murmur  Abdomen: Soft, nontender, BS +  Neurologic: Alert and oriented   Left leg external fixation in place no obvious drainage     Assessment/Plan:   Possible pin site infection  Acute watery diarrhea,  Headaches  Rash- itchy, improving   Hypovolemic Hyponatremia   Schizophrenia, unspecified  Essential HTN  Open Distal Left Femur Fracture s/p I&D and ORIF 8/24      plan  Discontinue laxatives and begin Imodium as needed after every  loose stool.  Unfortunately patient was denied by insurance for rehab or skilled nursing facility.  p.o. Keflex 500 mg q.6 hours hourly.  To complete a 10 day course , day 5/10  Add lactobacillus  add on fioricet prn  Add hydrocortisone cream  Patient is status post left knee arthrodesis revision on August 28 and left knee external fixator adjustment  Ortho wants the patient to be nonweightbearing for approximately 8 weeks followed by ex fix removal  Continue with PT and OT      prophylaxis Lovenox     Patient condition:  Stable     Anticipated discharge and Disposition:  Unfortunately patient was denied by insurance for rehab or skilled nursing facility.              VITAL SIGNS: 24 HRS MIN & MAX LAST   Temp  Min: 98.4 °F (36.9 °C)  Max: 98.4 °F (36.9 °C) 98.4 °F (36.9 °C)   BP  Min: 145/94  Max: 145/94 (!) 145/94     Pulse  Min: 88  Max: 88  88   Resp  Min: 15  Max: 18 16   SpO2  Min: 99 %  Max: 99 % 99 %       No results for input(s): WBC, RBC, HGB, HCT, MCV, MCH, MCHC, RDW, PLT, MPV, GRAN, LYMPH, MONO, BASO, NRBC in the last 168 hours.    No results for input(s): NA, K, CL, CO2, ANIONGAP, BUN, CREATININE, GLU, CALCIUM, PH, MG, ALBUMIN, PROT, ALKPHOS, ALT, AST, BILITOT in the last 168 hours.       Microbiology Results (last 7 days)       ** No results found for the last 168 hours. **             See below for Radiology    Scheduled Med:   benztropine  2 mg Oral Daily    cephALEXin  500 mg Oral Q6H    enoxaparin  40 mg Subcutaneous Daily    haloperidoL  5 mg Oral Daily before evening meal        Continuous Infusions:       PRN Meds:  acetaminophen, bisacodyL, butalbital-acetaminophen-caffeine -40 mg, hydrALAZINE, HYDROcodone-acetaminophen, HYDROmorphone, loperamide, magnesium hydroxide 400 mg/5 ml, morphine, ondansetron, ondansetron, sodium chloride 0.9%, sodium chloride 0.9%, sodium chloride 0.9%     VTE prophylaxis:     Patient condition:  Stable/Fair/Guarded/ Serious/ Critical    Anticipated discharge and  Disposition:         All diagnosis and differential diagnosis have been reviewed; assessment and plan has been documented; I have personally reviewed the labs and test results that are presently available; I have reviewed the patients medication list; I have reviewed the consulting providers response and recommendations. I have reviewed or attempted to review medical records based upon their availability    All of the patient's questions have been  addressed and answered. Patient's is agreeable to the above stated plan. I will continue to monitor closely and make adjustments to medical management as needed.  _____________________________________________________________________    Nutrition Status:    Radiology:  SURG FL Surgery Fluoro Usage  See OP Notes for results.     IMPRESSION: See OP Notes for results.     This procedure was auto-finalized by: Virtual Radiologist      Nereida Perdomo MD   09/12/2022

## 2022-09-12 NOTE — PT/OT/SLP PROGRESS
Physical Therapy         Treatment        Brandyn Ty   MRN: [id number]        PT Start Time: 0846     PT Stop Time: 0909    PT Total Time (min): 23 min       Billable Minutes:  Gait [id number] and Therapeutic Exercise 8  Total Minutes: 23    Treatment Type: Treatment  PT/PTA: PTA     PTA Visit Number: 4       General Precautions: Standard,    Orthopedic Precautions: Orthopedic Precautions : LLE non weight bearing   Braces: Braces: N/A    Spiritual, Cultural Beliefs, Mandaeism Practices, Values that Affect Care: no    Subjective:  Communicated with nurse prior to session.    Pain/Comfort  Pain Rating 1: 5/10  Location - Side 1: Left  Location 1: leg  Pain Addressed 1: Reposition, Distraction    Objective:  Patient found laying in bed upon arrival    Functional Mobility:  Bed Mobility:   Supine to sit: Standby Assistance   Sit to supine: Activity did not occur   Rolling: Activity did not occur   Scooting: Standby Assistance    Balance:   Static Sit: FAIR+: Able to take MINIMAL challenges from all directions  Dynamic Sit:  0: N/A  Static Stand: GOOD: Takes MODERATE challenges from all directions- pt was able to fix personal coffee, with min A given to setup, with no LOB noted, pt able to maintain WB percautions  Dynamic stand: 0: N/A    Transfer Training:  Sit to stand:Stand-by Assistance with Rolling Walker sit to stand from EOB, pt is able to maintain NWB LLE    Gait Training:  Patient gait trained NWB: left lower extremity 120  feet on level tile with Rolling Walker with Stand-by Assistance and Contact Guard Assistance.  Pt with demonstarting a  swing-to gait with decreased pj and decreased stride length. No LOB noted    Additional Treatment:  Semi supine therex, BLE 10x  BLE- ankle pumps, hip abd/add, SLR  RLE- heel slides, SAQ    Activity Tolerance:  Patient tolerated treatment well    Patient left up in chair with call button in reach.    Assessment:  Brandyn Ty is a 62 y.o. male with  a medical diagnosis of Displaced fracture of lateral condyle of left femur, initial encounter for open fracture type IIIA, IIIB, or IIIC. Pt was able to ambulate further today, will attempt stair tx next tx    Rehab potential is good.    Activity tolerance: Excellent and Good    Discharge recommendations: Discharge Facility/Level of Care Needs: nursing facility, skilled, other (see comments) (pending)     Equipment recommendations: Equipment Needed After Discharge: walker, rolling     GOALS:   Multidisciplinary Problems       Physical Therapy Goals          Problem: Physical Therapy    Goal Priority Disciplines Outcome Goal Variances Interventions   Physical Therapy Goal     PT, PT/OT Ongoing, Progressing     Description: Goals to be met by: 2022     Patient will increase functional independence with mobility by performin. Supine to sit with Modified [place]  2. Sit to supine with Modified [place]  3. Sit to stand transfer with Mod I   4. Gait  x 200 feet with Modified [place] using Rolling Walker.                          PLAN:    Patient to be seen daily  to address the above listed problems via gait training, therapeutic activities, therapeutic exercises  Plan of Care expires: 10/01/22  Plan of Care reviewed with: patient         2022

## 2022-09-12 NOTE — PLAN OF CARE
Expended pt's SNF referral radius to 40 miles and sent at a handful more referrals after receiving denials on previous referral. Awaiting response at this time.

## 2022-09-13 PROCEDURE — 11000001 HC ACUTE MED/SURG PRIVATE ROOM

## 2022-09-13 PROCEDURE — 25000003 PHARM REV CODE 250

## 2022-09-13 PROCEDURE — 25000003 PHARM REV CODE 250: Performed by: INTERNAL MEDICINE

## 2022-09-13 PROCEDURE — 25000003 PHARM REV CODE 250: Performed by: NURSE PRACTITIONER

## 2022-09-13 PROCEDURE — 97116 GAIT TRAINING THERAPY: CPT | Mod: CQ

## 2022-09-13 RX ADMIN — HYDROCODONE BITARTRATE AND ACETAMINOPHEN 1 TABLET: 5; 325 TABLET ORAL at 09:09

## 2022-09-13 RX ADMIN — HALOPERIDOL 5 MG: 5 TABLET ORAL at 05:09

## 2022-09-13 RX ADMIN — CEPHALEXIN 500 MG: 500 CAPSULE ORAL at 05:09

## 2022-09-13 RX ADMIN — BENZTROPINE MESYLATE 2 MG: 1 TABLET ORAL at 09:09

## 2022-09-13 RX ADMIN — HYDROCODONE BITARTRATE AND ACETAMINOPHEN 1 TABLET: 5; 325 TABLET ORAL at 01:09

## 2022-09-13 RX ADMIN — HYDROCODONE BITARTRATE AND ACETAMINOPHEN 1 TABLET: 5; 325 TABLET ORAL at 05:09

## 2022-09-13 RX ADMIN — CEPHALEXIN 500 MG: 500 CAPSULE ORAL at 12:09

## 2022-09-13 RX ADMIN — BUTALBITAL, ACETAMINOPHEN, AND CAFFEINE 1 TABLET: 50; 325; 40 TABLET ORAL at 08:09

## 2022-09-13 NOTE — PT/OT/SLP PROGRESS
Occupational Therapy      Patient Name:  Brandyn Ty   MRN:  [id number]    Patient not seen today secondary to Patient unwilling to participate. Pt. Declining despite max encouragement. Pt. Educated on importance however still declining.    9/13/2022

## 2022-09-13 NOTE — PROGRESS NOTES
"Inpatient Nutrition Evaluation    Admit Date: 8/24/2022   Nutrition Recommendation/Prescription     Continue regular diet as tolerated  Continue medical management of diarrhea    Nutrition Assessment     Chart Review    Reason Seen: length of stay    Diagnosis: Displaced fracture of lateral condyle of left femur, initial encounter for open fracture type IIIA, IIIB, or IIIC      Relevant Medical History: Schizophrenia, HTN     Nutrition-Related Medications: NaCl, zofran PRN    Nutrition-Related Labs: 8/30: WBC-13.2, RBC-3.25, H/H-9.6/30.9, glu-80  9/6: no recent labs  9/13: no recent labs      Diet Order: Diet Adult Regular  Oral Supplement Order: none at this time  Appetite/Oral Intake: good/% of meals  Factors Affecting Nutritional Intake: diarrhea  Food/Judaism/Cultural Preferences: none reported    Skin Integrity: incision  Wound(s):   n/a    Comments    8/30: pt reports good appetite, with constipation. UBW reported 185 lb with no weight loss.Admit weight of 83.9 kg (185 lb) on 8/24 and 8/25.    9/6: good appetite, eating 100% of most meals    9/13: pt sleeping; MD noted pt having diarrhea    Anthropometrics    Height: 5' 10" (177.8 cm)    Last Weight: 83.9 kg (185 lb) (08/25/22 2015) Weight Method: Bed Scale  BMI (Calculated): 26.5  BMI Classification: overweight (BMI 25-29.9)        Ideal Body Weight (IBW), Male: 166 lb  % Ideal Body Weight, Male (lb): 111.45 %                   Wt Readings from Last 5 Encounters:   08/25/22 83.9 kg (185 lb)   02/06/20 86.6 kg (191 lb)   11/11/19 87.6 kg (193 lb 3.2 oz)   10/23/19 86 kg (189 lb 9.6 oz)   08/22/19 87.5 kg (193 lb)     Weight Change(s) Since Admission:  Admit Weight: 83.9 kg (185 lb) (08/24/22 2115)  9/13: no updated weight    Patient Education    Not applicable.    Monitoring & Evaluation     Dietitian will monitor food and beverage intake and weight change.  Nutrition Risk/Follow-Up: low (follow-up in 5-7 days)  Patients assigned 'low nutrition risk' " status do not qualify for a full nutritional assessment but will be monitored and re-evaluated in a 5-7 day time period.

## 2022-09-13 NOTE — PROGRESS NOTES
[facility] Hospital Medicine Progress Note      Chief Complaint: Inpatient Follow-up for consult for med mgmt     HPI:   61 yo AAM with hx Schizophrenia and HTN who presents to the ED after a ground level fall landing on his left knee. He is an extremely poor historian. He sustained a displaced open femur fracture grade 3A vs B.  that warranted immediate surgical intervention. Dr. Chatterjee was consulted and pt underwent I&D and external fixation. The Hospitalist Service was consulted for medical management.    Was also found to have hyponatremia that is improving.  Plan for open reduction and internal fixation revision knee fusion today.  Patient ripped his IV lines last night and also refusing lab work.August 28 it patient had left knee arthrodesis revision and left knee external fixator adjustment  Orthopedic recs are to continue ex fix for 8 weeks and then removal, then begin progressive weight-bearing at that time.    S/p IM Haldol dose 100mg q monthly on 9/6  Unfortunately patient was denied by insurance for rehab or skilled nursing facility.  Change IV antibiotics to p.o. Keflex 500 mg q.6 hours hourly.  To complete a 10 day course         Interval Hx:  No new issues     Objective/physical exam:  General: In no acute distress, afebrile  Chest: Clear to auscultation bilaterally  Heart: RRR, +S1, S2, no appreciable murmur  Abdomen: Soft, nontender, BS +  Neurologic: Alert and oriented   Left leg external fixation in place no obvious drainage     Assessment/Plan:   Possible pin site infection  Acute watery diarrhea,  Headaches  Rash- itchy, improving   Hypovolemic Hyponatremia   Schizophrenia, unspecified  Essential HTN  Open Distal Left Femur Fracture s/p I&D and ORIF 8/24      plan  Discontinue laxatives and begin Imodium as needed after every loose stool.  Unfortunately patient was denied by insurance for rehab or skilled nursing facility.  p.o. Keflex 500 mg q.6 hours hourly.  To  complete a 10 day course , day 6/10  Add lactobacillus  add on fioricet prn  Add hydrocortisone cream  Patient is status post left knee arthrodesis revision on August 28 and left knee external fixator adjustment  Ortho wants the patient to be nonweightbearing for approximately 8 weeks followed by ex fix removal  Continue with PT and OT      prophylaxis Lovenox     Patient condition:  Stable     Anticipated discharge and Disposition:  Unfortunately patient was denied by insurance for rehab or skilled nursing facility.              VITAL SIGNS: 24 HRS MIN & MAX LAST   Temp  Min: 98 °F (36.7 °C)  Max: 98 °F (36.7 °C) 98 °F (36.7 °C)   BP  Min: 134/79  Max: 134/79 134/79   Pulse  Min: 76  Max: 76  76   Resp  Min: 15  Max: 20 16   SpO2  Min: 100 %  Max: 100 % 100 %       No results for input(s): WBC, RBC, HGB, HCT, MCV, MCH, MCHC, RDW, PLT, MPV, GRAN, LYMPH, MONO, BASO, NRBC in the last 168 hours.    No results for input(s): NA, K, CL, CO2, ANIONGAP, BUN, CREATININE, GLU, CALCIUM, PH, MG, ALBUMIN, PROT, ALKPHOS, ALT, AST, BILITOT in the last 168 hours.       Microbiology Results (last 7 days)       ** No results found for the last 168 hours. **             See below for Radiology    Scheduled Med:   benztropine  2 mg Oral Daily    cephALEXin  500 mg Oral Q6H    enoxaparin  40 mg Subcutaneous Daily    haloperidoL  5 mg Oral Daily before evening meal        Continuous Infusions:       PRN Meds:  acetaminophen, bisacodyL, butalbital-acetaminophen-caffeine -40 mg, hydrALAZINE, HYDROcodone-acetaminophen, HYDROmorphone, loperamide, magnesium hydroxide 400 mg/5 ml, morphine, ondansetron, ondansetron, sodium chloride 0.9%, sodium chloride 0.9%, sodium chloride 0.9%         VTE prophylaxis:     Patient condition:  Stable/Fair/Guarded/ Serious/ Critical    Anticipated discharge and Disposition:         All diagnosis and differential diagnosis have been reviewed; assessment and plan has been documented; I have personally  reviewed the labs and test results that are presently available; I have reviewed the patients medication list; I have reviewed the consulting providers response and recommendations. I have reviewed or attempted to review medical records based upon their availability    All of the patient's questions have been  addressed and answered. Patient's is agreeable to the above stated plan. I will continue to monitor closely and make adjustments to medical management as needed.  _____________________________________________________________________    Nutrition Status:    Radiology:  SURG FL Surgery Fluoro Usage  See OP Notes for results.     IMPRESSION: See OP Notes for results.     This procedure was auto-finalized by: Virtual Radiologist      Nereida Perdomo MD   09/13/2022

## 2022-09-13 NOTE — PT/OT/SLP PROGRESS
"Physical Therapy         Treatment        Brandyn Ty   MRN: [id number]        PT Start Time: 0837     PT Stop Time: 0856    PT Total Time (min): 19 min       Billable Minutes:  Gait [id number]  Total Minutes: 19    Treatment Type: Treatment  PT/PTA: PTA     PTA Visit Number: 5       General Precautions: Standard,    Orthopedic Precautions: Orthopedic Precautions : LLE non weight bearing   Braces: Braces: N/A    Spiritual, Cultural Beliefs, Adventism Practices, Values that Affect Care: no    Subjective:  Communicated with nurse prior to session.  Pt stated he did not want to perform therex at conclusion of therapy tx, due to being freaked out that he almost "swallowed his tongue" during gait tx  Pain/Comfort  Pain Rating 1: 0/10    Objective:  Patient found sitting UIC upon arrival    Functional Mobility:  Bed Mobility:   Supine to sit: Activity did not occur   Sit to supine: Standby Assistance- HOB flat   Rolling: Activity did not occur   Scooting: Standby Assistance- scooted self toward HOB    Balance:   Static Sit: GOOD: Takes MODERATE challenges from all directions  Dynamic Sit:  0: N/A  Static Stand: FAIR+: Takes MINIMAL challenges from all directions  Dynamic stand: 0: N/A    Transfer Training:  Bed <> Chair:  Step Transfer with Stand-by Assistance with Rolling Walker pt t/f from bedside chair to EOB with a hop to gait, no LOB noted  Sit to stand from bedside chair, with no issues, pt is able to maintain NWB LL    Gait Training:  Patient gait trained NWB: left lower extremity 100  feet on level tile with Rolling Walker with Contact Guard Assistance.  Pt with demonstarting a  swing-to gait with decreased pj and decreased step length.Impairments contributing to gait deviations include decreased ROM, no major LOB noted    Stair Training:  Attempted to perform stair tx, education given on how to perform sequence however once it was pt's turn to attemp, he kept stating he could not perform task, he " stated his steps at home are wide enough for RW use    Activity Tolerance:  Patient tolerated treatment well    Patient left HOB elevated with call button in reach.    Assessment:  Brandyn Ty is a 62 y.o. male with a medical diagnosis of Displaced fracture of lateral condyle of left femur, initial encounter for open fracture type IIIA, IIIB, or IIIC. Pt unable to perform stair tx at this time, and has no help at home, pt would benefit from placement at this time due to being unsafe to d/c home    Rehab potential is good.    Activity tolerance: Good and Fair    Discharge recommendations: Discharge Facility/Level of Care Needs: nursing facility, skilled     Equipment recommendations: Equipment Needed After Discharge: walker, rolling     GOALS:   Multidisciplinary Problems       Physical Therapy Goals          Problem: Physical Therapy    Goal Priority Disciplines Outcome Goal Variances Interventions   Physical Therapy Goal     PT, PT/OT Ongoing, Progressing     Description: Goals to be met by: 2022     Patient will increase functional independence with mobility by performin. Supine to sit with Modified [place]  2. Sit to supine with Modified [place]  3. Sit to stand transfer with Mod I   4. Gait  x 200 feet with Modified [place] using Rolling Walker.                          PLAN:    Patient to be seen daily  to address the above listed problems via gait training, therapeutic activities  Plan of Care expires: 10/01/22  Plan of Care reviewed with: patient         2022

## 2022-09-14 PROCEDURE — 25000003 PHARM REV CODE 250: Performed by: NURSE PRACTITIONER

## 2022-09-14 PROCEDURE — 97164 PT RE-EVAL EST PLAN CARE: CPT

## 2022-09-14 PROCEDURE — 25000003 PHARM REV CODE 250: Performed by: INTERNAL MEDICINE

## 2022-09-14 PROCEDURE — 25000003 PHARM REV CODE 250

## 2022-09-14 PROCEDURE — 11000001 HC ACUTE MED/SURG PRIVATE ROOM

## 2022-09-14 PROCEDURE — 97530 THERAPEUTIC ACTIVITIES: CPT

## 2022-09-14 PROCEDURE — 97535 SELF CARE MNGMENT TRAINING: CPT

## 2022-09-14 RX ADMIN — HYDROCODONE BITARTRATE AND ACETAMINOPHEN 1 TABLET: 5; 325 TABLET ORAL at 05:09

## 2022-09-14 RX ADMIN — HYDROCODONE BITARTRATE AND ACETAMINOPHEN 1 TABLET: 5; 325 TABLET ORAL at 09:09

## 2022-09-14 RX ADMIN — BENZTROPINE MESYLATE 2 MG: 1 TABLET ORAL at 08:09

## 2022-09-14 RX ADMIN — HYDROCODONE BITARTRATE AND ACETAMINOPHEN 1 TABLET: 5; 325 TABLET ORAL at 01:09

## 2022-09-14 RX ADMIN — CEPHALEXIN 500 MG: 500 CAPSULE ORAL at 11:09

## 2022-09-14 RX ADMIN — HYDROCODONE BITARTRATE AND ACETAMINOPHEN 1 TABLET: 5; 325 TABLET ORAL at 07:09

## 2022-09-14 RX ADMIN — CEPHALEXIN 500 MG: 500 CAPSULE ORAL at 05:09

## 2022-09-14 RX ADMIN — CEPHALEXIN 500 MG: 500 CAPSULE ORAL at 07:09

## 2022-09-14 RX ADMIN — HALOPERIDOL 5 MG: 5 TABLET ORAL at 05:09

## 2022-09-14 RX ADMIN — HYDROCODONE BITARTRATE AND ACETAMINOPHEN 1 TABLET: 5; 325 TABLET ORAL at 11:09

## 2022-09-14 NOTE — PROGRESS NOTES
[facility] Hospital Medicine Progress Note        Chief Complaint: Inpatient Follow-up for consult for med mgmt     HPI:   63 yo AAM with hx Schizophrenia and HTN who presents to the ED after a ground level fall landing on his left knee. He is an extremely poor historian. He sustained a displaced open femur fracture grade 3A vs B.  that warranted immediate surgical intervention. Dr. Chatterjee was consulted and pt underwent I&D and external fixation. The Hospitalist Service was consulted for medical management.    Was also found to have hyponatremia that is improving.  Plan for open reduction and internal fixation revision knee fusion today.  Patient ripped his IV lines last night and also refusing lab work.August 28 it patient had left knee arthrodesis revision and left knee external fixator adjustment  Orthopedic recs are to continue ex fix for 8 weeks and then removal, then begin progressive weight-bearing at that time.    S/p IM Haldol dose 100mg q monthly on 9/6  Unfortunately patient was denied by insurance for rehab or skilled nursing facility.  Change IV antibiotics to p.o. Keflex 500 mg q.6 hours hourly.  To complete a 10 day course         Interval Hx:  No new issues   Vitals reviewed and stable     Objective/physical exam:  General: In no acute distress, afebrile  Chest: Clear to auscultation bilaterally  Heart: RRR, +S1, S2, no appreciable murmur  Abdomen: Soft, nontender, BS +  Neurologic: Alert and oriented   Left leg external fixation in place no obvious drainage     Assessment/Plan:   Possible pin site infection  Acute watery diarrhea,  Headaches  Rash- itchy, improving   Hypovolemic Hyponatremia   Schizophrenia, unspecified  Essential HTN  Open Distal Left Femur Fracture s/p I&D and ORIF 8/24      plan  Discontinue laxatives and begin Imodium as needed after every loose stool.  Unfortunately patient was denied by insurance for rehab or skilled nursing facility.  p.o. Keflex  500 mg q.6 hours hourly.  To complete a 10 day course , day 7/10  Add lactobacillus  add on fioricet prn  Add hydrocortisone cream  Patient is status post left knee arthrodesis revision on August 28 and left knee external fixator adjustment  Ortho wants the patient to be nonweightbearing for approximately 8 weeks followed by ex fix removal  Continue with PT and OT      prophylaxis Lovenox     Patient condition:  Stable     Anticipated discharge and Disposition:  Unfortunately patient was denied by insurance for rehab or skilled nursing facility.          VITAL SIGNS: 24 HRS MIN & MAX LAST   Temp  Min: 96.6 °F (35.9 °C)  Max: 96.6 °F (35.9 °C) 96.6 °F (35.9 °C)   BP  Min: 137/78  Max: 137/78 137/78   Pulse  Min: 92  Max: 92  92   Resp  Min: 16  Max: 20 18   SpO2  Min: 99 %  Max: 99 % 99 %       No results for input(s): WBC, RBC, HGB, HCT, MCV, MCH, MCHC, RDW, PLT, MPV, GRAN, LYMPH, MONO, BASO, NRBC in the last 168 hours.    No results for input(s): NA, K, CL, CO2, ANIONGAP, BUN, CREATININE, GLU, CALCIUM, PH, MG, ALBUMIN, PROT, ALKPHOS, ALT, AST, BILITOT in the last 168 hours.       Microbiology Results (last 7 days)       ** No results found for the last 168 hours. **             See below for Radiology    Scheduled Med:   benztropine  2 mg Oral Daily    cephALEXin  500 mg Oral Q6H    enoxaparin  40 mg Subcutaneous Daily    haloperidoL  5 mg Oral Daily before evening meal        Continuous Infusions:       PRN Meds:  acetaminophen, bisacodyL, butalbital-acetaminophen-caffeine -40 mg, hydrALAZINE, HYDROcodone-acetaminophen, HYDROmorphone, loperamide, magnesium hydroxide 400 mg/5 ml, morphine, ondansetron, ondansetron, sodium chloride 0.9%, sodium chloride 0.9%, sodium chloride 0.9%       VTE prophylaxis:     Patient condition:  Stable/Fair/Guarded/ Serious/ Critical    Anticipated discharge and Disposition:         All diagnosis and differential diagnosis have been reviewed; assessment and plan has been  documented; I have personally reviewed the labs and test results that are presently available; I have reviewed the patients medication list; I have reviewed the consulting providers response and recommendations. I have reviewed or attempted to review medical records based upon their availability    All of the patient's questions have been  addressed and answered. Patient's is agreeable to the above stated plan. I will continue to monitor closely and make adjustments to medical management as needed.  _____________________________________________________________________    Nutrition Status:    Radiology:  SURG FL Surgery Fluoro Usage  See OP Notes for results.     IMPRESSION: See OP Notes for results.     This procedure was auto-finalized by: Virtual Radiologist      Nereida Perdomo MD   09/14/2022

## 2022-09-14 NOTE — PLAN OF CARE
Problem: Fall Injury Risk  Goal: Absence of Fall and Fall-Related Injury  Outcome: Ongoing, Progressing     Problem: Pain Acute  Goal: Acceptable Pain Control and Functional Ability  Outcome: Ongoing, Progressing     Problem: Adult Inpatient Plan of Care  Goal: Optimal Comfort and Wellbeing  Outcome: Ongoing, Progressing

## 2022-09-14 NOTE — PT/OT/SLP PROGRESS
Physical Therapy Treatment    Patient Name:  Brandyn Ty   MRN:  [id number]    Recommendations:     Discharge Recommendations:  nursing facility, skilled   Discharge Equipment Recommendations: walker, rolling   Barriers to discharge:  lack of home support    Assessment:     Brandyn Ty is a 62 y.o. male admitted with a medical diagnosis of Displaced fracture of lateral condyle of left femur, initial encounter for open fracture type IIIA, IIIB, or IIIC.  He presents with the following impairments/functional limitations:  weakness, impaired endurance, impaired self care skills, impaired functional mobility, gait instability, decreased lower extremity function, pain, decreased ROM, orthopedic precautions.    Rehab Prognosis: Good; patient would benefit from acute skilled PT services to address these deficits and reach maximum level of function.    Recent Surgery: Procedure(s) (LRB):  arthrodesis  (Left) 17 Days Post-Op    Plan:     During this hospitalization, patient to be seen daily (/BID) to address the identified rehab impairments via gait training, therapeutic activities, therapeutic exercises, neuromuscular re-education and progress toward the following goals:    Plan of Care Expires:  10/14/22    Subjective     Chief Complaint: pain  Patient/Family Comments/goals: to go home  Pain/Comfort:  Pain Rating 1: 8/10  Location - Side 1: Left  Location 1: arm      Objective:     Communicated with RN prior to session.  Patient found up in chair with external fixator upon PT entry to room.     General Precautions: Standard,     Orthopedic Precautions:LLE non weight bearing   Braces:    Respiratory Status: Room air     Functional Mobility:  Transfers:     Sit to Stand:  stand by assistance with rolling walker  Gait: pt ambulated 200ft with a hop to gt pattern with a RW and CGA.   Balance: pt performed static balance x 1 minute while urinating.       AM-PAC 6 CLICK MOBILITY  Turning over in bed  (including adjusting bedclothes, sheets and blankets)?: 3  Sitting down on and standing up from a chair with arms (e.g., wheelchair, bedside commode, etc.): 3  Moving from lying on back to sitting on the side of the bed?: 3  Moving to and from a bed to a chair (including a wheelchair)?: 3  Need to walk in hospital room?: 3  Climbing 3-5 steps with a railing?: 3  Basic Mobility Total Score: 18       Patient left up in chair with all lines intact, call button in reach, and RN notified..    GOALS:   Multidisciplinary Problems       Physical Therapy Goals          Problem: Physical Therapy    Goal Priority Disciplines Outcome Goal Variances Interventions   Physical Therapy Goal     PT, PT/OT Ongoing, Progressing     Description: Goals to be met by: 2022     Patient will increase functional independence with mobility by performin. Supine to sit with Modified [place]  2. Sit to supine with Modified [place]  3. Sit to stand transfer with Mod I   4. Gait  x 200 feet with Modified [place] using Rolling Walker.                          Time Tracking:     PT Received On: 22  PT Start Time: 1230     PT Stop Time: 1245  PT Total Time (min): 15 min     Billable Minutes: Therapeutic Activity 15 minutes    Treatment Type: Treatment  PT/PTA: PT     PTA Visit Number: 1     2022

## 2022-09-14 NOTE — PT/OT/SLP PROGRESS
Occupational Therapy  Treatment    Brandyn Ty   MRN: [id number]   Admitting Diagnosis: Displaced fracture of lateral condyle of left femur, initial encounter for open fracture type IIIA, IIIB, or IIIC    OT Date of Treatment: 09/14/22   OT Start Time: 0840  OT Stop Time: 0855  OT Total Time (min): 15 min     Billable Minutes:  Self Care/Home Management 1 unit  Total Minutes: 15 minutes      OT/IVANNA: OT     IVANNA Visit Number: 2    General Precautions: Standard, fall  Orthopedic Precautions: LLE non weight bearing  Braces: N/A    Spiritual, Cultural Beliefs, Episcopal Practices, Values that Affect Care: no    Subjective:    Pain/Comfort  Pain Rating 1: 0/10    Objective:  Patient found with: external fixator    Functional Mobility:  Bed Mobility:   Supine to sit: Standby Assistance   Scooting: Standby Assistance    Transfer Training:   Sit to stand:Stand-by Assistance with Rolling Walker    Bed <> Chair:  hop to with Contact Guard Assistance with Rolling Walker      Feeding:  Pt able to INDly open food packages/containers and prepare foods.     Additional Treatment:    Patient left up in chair with call button in reach    ASSESSMENT:  Brandyn Ty is a 62 y.o. male with a medical diagnosis of Displaced fracture of lateral condyle of left femur, initial encounter for open fracture type IIIA, IIIB, or IIIC. Pt remains at high risk for falls 2/2 decreased balance and orthopedic pxns- pt would benefit from placement at d/c.     Rehab potential is good    Activity tolerance: Good    Discharge recommendations: nursing facility, skilled, rehabilitation facility     Equipment recommendations: walker, rolling, bedside commode, hip kit     GOALS:   Multidisciplinary Problems       Occupational Therapy Goals          Problem: Occupational Therapy    Goal Priority Disciplines Outcome Interventions   Occupational Therapy Goal     OT, PT/OT Ongoing, Progressing    Description: Goals to be met by: 9/23    Patient  will increase functional independence with ADLs by performing:    LE Dressing with Modified [place].  Grooming while standing at sink with Modified [place].  Toileting from toilet with Modified [place] for hygiene and clothing management.   Toilet transfer to toilet with Modified [place].                         Plan:  Patient to be seen 3 x/week, 5 x/week to address the above listed problems via self-care/home management, therapeutic activities, therapeutic exercises  Plan of Care expires: 09/23/22  Plan of Care reviewed with: patient         09/14/2022

## 2022-09-14 NOTE — PT/OT/SLP RE-EVAL
Physical Therapy Re-evaluation    Patient Name:  Brandyn Ty   MRN:  [id number]    Recommendations:     Discharge Recommendations:  nursing facility, skilled   Discharge Equipment Recommendations: walker, rolling   Barriers to discharge:  lack of family support    Assessment:     Brandyn Ty is a 62 y.o. male admitted with a medical diagnosis of Displaced fracture of lateral condyle of left femur, initial encounter for open fracture type IIIA, IIIB, or IIIC.  He presents with the following impairments/functional limitations:  weakness, impaired endurance, impaired self care skills, impaired functional mobility, gait instability, decreased lower extremity function, decreased ROM, orthopedic precautions.    Rehab Prognosis:  Good; patient would benefit from acute skilled PT services to address these deficits and reach maximum level of function.      Recent Surgery: Procedure(s) (LRB):  arthrodesis  (Left) 17 Days Post-Op    Plan:     During this hospitalization, patient to be seen daily (/BID) to address the above listed problems via gait training, therapeutic activities, therapeutic exercises, neuromuscular re-education  Plan of Care Expires:  10/14/22  Plan of Care Reviewed with: patient    Subjective     Communicated with Rn prior to session.  Patient found up in chair with peripheral IV, external fixator upon PT entry to room, agreeable to evaluation.      Chief Complaint: agitation  Patient comments/goals: to be independent  Pain/Comfort:  Pain Rating 1: 0/10    Patients cultural, spiritual, Oriental orthodox conflicts given the current situation: no      Objective:     Patient found with: peripheral IV, external fixator     General Precautions: Standard,     Orthopedic Precautions:LLE non weight bearing   Braces:    Respiratory Status: Room air    Exams:  RLE ROM: WFL  RLE Strength: WFL    Functional Mobility:  Transfers:     Sit to Stand:  stand by assistance with rolling walker  Gait: pt  ambulated 45 ft w/ a hop to gt pattern with a RW and CGA.     AM-PAC 6 CLICK MOBILITY  Total Score:18     Patient left up in chair with all lines intact, call button in reach, and RN notified.    GOALS:   Multidisciplinary Problems       Physical Therapy Goals          Problem: Physical Therapy    Goal Priority Disciplines Outcome Goal Variances Interventions   Physical Therapy Goal     PT, PT/OT Ongoing, Progressing     Description: Goals to be met by: 2022     Patient will increase functional independence with mobility by performin. Supine to sit with Modified [place]  2. Sit to supine with Modified [place]  3. Sit to stand transfer with Mod I   4. Gait  x 200 feet with Modified [place] using Rolling Walker.                          History:     Past Medical History:   Diagnosis Date    Anxiety     Hypertension        Past Surgical History:   Procedure Laterality Date    APPLICATION OF LARGE EXTERNAL FIXATION DEVICE TO TIBIA Left 2022    Procedure: APPLICATION, EXTERNAL FIXATION DEVICE, LARGE, TIBIA;  Surgeon: Shawn Chatterjee DO;  [place] [facility] [place]  Service: Orthopedics;  Laterality: Left;       Time Tracking:     PT Received On: 22  PT Start Time: 901     PT Stop Time: 912  PT Total Time (min): 11 min     Billable Minutes: Re-eval , 11 minutes      2022

## 2022-09-15 PROCEDURE — 97116 GAIT TRAINING THERAPY: CPT | Mod: CQ

## 2022-09-15 PROCEDURE — 25000003 PHARM REV CODE 250: Performed by: NURSE PRACTITIONER

## 2022-09-15 PROCEDURE — 11000001 HC ACUTE MED/SURG PRIVATE ROOM

## 2022-09-15 PROCEDURE — 97535 SELF CARE MNGMENT TRAINING: CPT

## 2022-09-15 RX ADMIN — HYDROCODONE BITARTRATE AND ACETAMINOPHEN 1 TABLET: 5; 325 TABLET ORAL at 03:09

## 2022-09-15 RX ADMIN — HYDROCODONE BITARTRATE AND ACETAMINOPHEN 1 TABLET: 5; 325 TABLET ORAL at 06:09

## 2022-09-15 RX ADMIN — HYDROCODONE BITARTRATE AND ACETAMINOPHEN 1 TABLET: 5; 325 TABLET ORAL at 10:09

## 2022-09-15 NOTE — PT/OT/SLP PROGRESS
Physical Therapy         Treatment        Brandyn Ty   MRN: [id number]        PT Start Time: 0931     PT Stop Time: 0950    PT Total Time (min): 19 min       Billable Minutes:  Gait [id number]  Total Minutes: 19    Treatment Type: Treatment  PT/PTA: PTA     PTA Visit Number: 2       General Precautions: Standard,    Orthopedic Precautions: Orthopedic Precautions : LLE non weight bearing   Braces:      Spiritual, Cultural Beliefs, Rastafari Practices, Values that Affect Care: no    Subjective:  Communicated with nurse prior to session.  Pt c/o feeling SOB during gait, with spo2 taken  Pain/Comfort  Pain Rating 1: 10/10  Location - Side 1: Left  Location 1: leg  Pain Addressed 1: Reposition, Distraction    Objective:  Patient found laying in bed upon arrival, with  spo2 taken after gait tx at 98%    Functional Mobility:  Bed Mobility:   Supine to sit: Standby Assistance-with HOB elevated   Sit to supine: Activity did not occur   Rolling: Activity did not occur   Scooting: Standby Assistance    Balance:   Static Sit: FAIR+: Able to take MINIMAL challenges from all directions  Dynamic Sit:  0: N/A  Static Stand: GOOD: Takes MODERATE challenges from all directions  Dynamic stand: 0: N/A    Transfer Training:  Toilet Transfer:  Pt Step Transfer with Contact Guard Assistance with Rolling Walker pt t/f from EOB to toilet with a hop to gait, with no LOB noted, and was able to maintain NWB LLE    Gait Training:  Patient gait trained NWB: left lower extremity 50  feet on level tile with Rolling Walker with Contact Guard Assistance.  Pt with demonstarting a  swing-to gait with decreased pj and decreased step length.Impairments contributing to gait deviations include decreased ROM and with chair in tow due to decreased endurance    Additional Treatment:  Semi supine therex, 10x   BLE-ankle pumps, RLE- heel slides, SAQ,hip abd/add, SLR  Attempted to perform therex on LLE however, pt r/f to range hip due to  increased pain    Activity Tolerance:  Patient tolerated treatment well    Patient left up in chair with call button in reach.    Assessment:  Brandyn Ty is a 62 y.o. male with a medical diagnosis of Displaced fracture of lateral condyle of left femur, initial encounter for open fracture type IIIA, IIIB, or IIIC. Pt limited today due to feeling SOB during therapy, however spo2 noted WNL    Rehab potential is good.    Activity tolerance: Good and Fair    Discharge recommendations: Discharge Facility/Level of Care Needs: nursing facility, skilled     Equipment recommendations: Equipment Needed After Discharge: walker, rolling     GOALS:   Multidisciplinary Problems       Physical Therapy Goals          Problem: Physical Therapy    Goal Priority Disciplines Outcome Goal Variances Interventions   Physical Therapy Goal     PT, PT/OT Ongoing, Progressing     Description: Goals to be met by: 2022     Patient will increase functional independence with mobility by performin. Supine to sit with Modified [place]  2. Sit to supine with Modified [place]  3. Sit to stand transfer with Mod I   4. Gait  x 200 feet with Modified [place] using Rolling Walker.                          PLAN:    Patient to be seen daily  to address the above listed problems via gait training, therapeutic activities, therapeutic exercises  Plan of Care expires: 10/14/22  Plan of Care reviewed with: patient         9/15/2022

## 2022-09-15 NOTE — PT/OT/SLP PROGRESS
"Occupational Therapy  Treatment    Brandyn Ty   MRN: [id number]   Admitting Diagnosis: Displaced fracture of lateral condyle of left femur, initial encounter for open fracture type IIIA, IIIB, or IIIC    OT Date of Treatment: 09/15/22   OT Start Time: 1456  OT Stop Time: 1511  OT Total Time (min): 15 min    Billable Minutes:  Self Care/Home Management 15    OT/IVANNA: OT     IVANNA Visit Number: 3    General Precautions: Standard, fall  Orthopedic Precautions: LLE non weight bearing  Braces:    Respiratory Status: Room air         Subjective:  Pt initially reluctant to participate but agreed to ambulate to restroom for toileting.  Pain/Comfort  Pain Rating 1: 0/10    Objective:  Patient found with: external fixator     Functional Mobility:  Bed Mobility:  mod I       Transfers:SBA sit to stand from EOB and to and from toilet using grab bars; note poor safety awareness with use of RW tf to toilet and required cues to correct technique for safety        Functional Ambulation: CGA using RW to ambulate in room with good adherence to LLE NWB without cuing    Activities of Daily Living:     Pt toileted with mod I to empty bladder in sitting at toilet.    How much help from another person does this patient currently need?   1 = Unable, Total/Dependent Assistance  2 = A lot, Maximum/Moderate Assistance  3 = A little, Minimum/Contact Guard/Supervision  4 = None, Modified [place]/Independent    Putting on and taking off regular lower body clothing? : 2  Bathing (including washing, rinsing, drying)?: 3  Toileting, which includes using toilet, bedpan, or urinal? : 3  Putting on and taking off regular upper body clothing?: 4  Taking care of personal grooming such as brushing teeth?: 4  Eating meals?: 4  Daily Activity Total Score: 20     AM-PAC Raw Score CMS "G-Code Modifier Level of Impairment Assistance   6 % Total / Unable   7 - 8 CM 80 - 100% Maximal Assist   9-13 CL 60 - 80% Moderate Assist   14 - 19 CK 40 - " 60% Moderate Assist   20 - 22 CJ 20 - 40% Minimal Assist   23 CI 1-20% SBA / CGA   24 CH 0% Independent/ Mod I       Patient left HOB elevated with call button in reach    ASSESSMENT:  Brandyn Ty is a 62 y.o. male with a medical diagnosis of Displaced fracture of lateral condyle of left femur, initial encounter for open fracture type IIIA, IIIB, or IIIC and presents with primary limitation of mobility d/t orthopedic precautions NWB to LLE and presence of external fixator.    Rehab identified problem list/impairments: Rehab identified problem list/impairments: impaired functional mobility, decreased lower extremity function, decreased ROM, orthopedic precautions    Rehab potential is good.    Activity tolerance: Good    Discharge recommendations: Discharge Facility/Level of Care Needs: nursing facility, skilled     Barriers to discharge:      Equipment recommendations: walker, rolling, bedside commode     GOALS:   Multidisciplinary Problems       Occupational Therapy Goals          Problem: Occupational Therapy    Goal Priority Disciplines Outcome Interventions   Occupational Therapy Goal     OT, PT/OT Ongoing, Progressing    Description: Goals to be met by: 9/23    Patient will increase functional independence with ADLs by performing:    LE Dressing with Modified [place].  Grooming while standing at sink with Modified [place].  Toileting from toilet with Modified [place] for hygiene and clothing management.   Toilet transfer to toilet with Modified [place].                         Plan:  Patient to be seen 3x/week to address the above listed problems via self-care/home management, therapeutic activities, therapeutic exercises  Plan of Care expires: 09/23/22  Plan of Care reviewed with: patient         09/15/2022

## 2022-09-15 NOTE — PROGRESS NOTES
[facility]   PROGRESS NOTE        CHIEF COMPLAINT   Hospital follow up    HOSPITAL COURSE   63 yo AAM with hx Schizophrenia and HTN who presents to the ED after a ground level fall landing on his left knee. He is an extremely poor historian. He sustained a displaced open femur fracture grade 3A vs B.  that warranted immediate surgical intervention. Dr. Chatterjee was consulted and pt underwent I&D and external fixation. The Hospitalist Service was consulted for medical management.    Was also found to have hyponatremia that is improving.  Plan for open reduction and internal fixation revision knee fusion today.  Patient ripped his IV lines last night and also refusing lab work.August 28 it patient had left knee arthrodesis revision and left knee external fixator adjustment  Orthopedic recs are to continue ex fix for 8 weeks and then removal, then begin progressive weight-bearing at that time.    S/p IM Haldol dose 100mg q monthly on 9/6  Unfortunately patient was denied by insurance for rehab or skilled nursing facility.  Change IV antibiotics to p.o. Keflex 500 mg q.6 hours hourly.  To complete a 10 day course for pin site possible infection.       Today  Seen examined this morning.  Doing well without any complaints.        OBJECTIVE/PHYSICAL EXAM     VITAL SIGNS (MOST RECENT):  Temp: 96.6 °F (35.9 °C) (09/14/22 0900)  Pulse: 92 (09/14/22 0900)  Resp: 20 (09/15/22 0337)  BP: 137/78 (09/14/22 0900)  SpO2: 99 % (09/14/22 0900) VITAL SIGNS (24 HOUR RANGE):  Temp:  [96.6 °F (35.9 °C)] 96.6 °F (35.9 °C)  Pulse:  [92] 92  Resp:  [18-20] 20  SpO2:  [99 %] 99 %  BP: (137)/(78) 137/78   GENERAL: In no acute distress, afebrile  HEENT:  CHEST: Clear to auscultation bilaterally  HEART: S1, S2, no appreciable murmur  ABDOMEN: Soft, nontender, BS +  MSK: Warm, no lower extremity edema, no clubbing or cyanosis, left lower extremity in a external fixator  NEUROLOGIC: Alert and oriented  x4, moving all extremities with good strength   INTEGUMENTARY:  PSYCHIATRY:        ASSESSMENT/PLAN   Possible external fixator site insertion cellulitis   Acute watery diarrhea     History of:  Schizophrenia, essential hypertension, open distal left femur fracture status post I&D and ORIF August 24      Orthopedic surgery signed off.  Follow-up in 2 to 3 weeks with Dr. Chatterjee.  Ex fix x8 weeks.  Recommend Keflex times 10 days.  Case management following.  Pending SNF.  Labs tomorrow    DVT prophylaxis:  Xarelto 10    Anticipated discharge and disposition:   __________________________________________________________________________    LABS/MICRO/MEDS/DIAGNOSTICS       LABS  No results for input(s): NA, K, CHLORIDE, CO2, BUN, CREATININE, GLUCOSE, CALCIUM, ALKPHOS, AST, ALT, ALBUMIN in the last 72 hours.  No results for input(s): WBC, RBC, HCT, MCV, PLT in the last 72 hours.    Invalid input(s):     MICROBIOLOGY  Microbiology Results (last 7 days)       ** No results found for the last 168 hours. **            MEDICATIONS   benztropine  2 mg Oral Daily    cephALEXin  500 mg Oral Q6H    enoxaparin  40 mg Subcutaneous Daily    haloperidoL  5 mg Oral Daily before evening meal      INFUSIONS      DIAGNOSTIC TESTS  SURG FL Surgery Fluoro Usage   Final Result      CTA Lower Extremity Left   Final Result      1. Acute, nondisplaced fracture at the distal femoral metaphysis stabilized by an external fixator device.  This is at the level of the chronically ankylosed knee joint.  Fracture appears open with soft tissue gas extending from the anterior soft tissue defect in to the fracture.   2. No evidence of acute vascular injury.  Some portions of the vasculature are obscured related to beam hardening artifact from external fixator device.  The tip of the lower femoral fixation screw extends to just adjacent to the distal superficial femoral artery without appreciable vascular injury or contact.   3. Two vessel runoff to the  ankle.  Posterior tibial artery is diminutive and appears to be occluded proximally.  This is not definitively acute.   4. Trabeculated, distended bladder.   Minor discrepancy from preliminary report         Electronically signed by: Radha Valdivia   Date:    08/25/2022   Time:    10:13      SURG FL Surgery Fluoro Usage   Final Result      X-Ray Tibia Fibula 2 View Left   Final Result      No orthopedic hardware.         Electronically signed by: Radha Valdivia   Date:    08/24/2022   Time:    18:55      X-Ray Knee 1 or 2 View Left   Final Result      Acute on chronic appearing deformity at the left knee.  There appears to be have been bony fusion across the joint space with acute superimposed fracture versus pseudoarticulation.  There appears to be an anterior laceration.  Correlate for open fracture.         Electronically signed by: Radha Valdivia   Date:    08/24/2022   Time:    18:55      X-Ray Femur 2 View Left   Final Result      Acute on chronic appearing deformity at the left knee.  There appears to be have been bony fusion across the joint space with acute superimposed fracture versus pseudoarticulation.  There appears to be an anterior laceration.  Correlate for open fracture.         Electronically signed by: Radha Valdivia   Date:    08/24/2022   Time:    18:55      RADIOLOGY REPORT   Final Result               All diagnosis and differential diagnosis have been reviewed; assessment and plan has been documented. I have personally reviewed the labs and test results that are presently available; I have reviewed the patients medication list. I have reviewed the consulting providers response and recommendations. I have reviewed or attempted to review medical records based upon their availability.  All of the patient's questions have been addressed and answered. Patient's is agreeable to the above stated plan. I will continue to monitor closely and make adjustments to medical management as  needed.  This document was created using [employer] Fluency Direct.  Transcription errors may have been made.  Please contact me if any questions may rise regarding documentation to clarify verbiage.        Kyler New MD   09/15/2022   Internal Medicine

## 2022-09-16 PROCEDURE — 25000003 PHARM REV CODE 250: Performed by: INTERNAL MEDICINE

## 2022-09-16 PROCEDURE — 11000001 HC ACUTE MED/SURG PRIVATE ROOM

## 2022-09-16 PROCEDURE — 25000003 PHARM REV CODE 250: Performed by: NURSE PRACTITIONER

## 2022-09-16 PROCEDURE — 97110 THERAPEUTIC EXERCISES: CPT | Mod: CQ

## 2022-09-16 PROCEDURE — 97116 GAIT TRAINING THERAPY: CPT | Mod: CQ

## 2022-09-16 PROCEDURE — 25000003 PHARM REV CODE 250

## 2022-09-16 RX ADMIN — HYDROCODONE BITARTRATE AND ACETAMINOPHEN 1 TABLET: 5; 325 TABLET ORAL at 03:09

## 2022-09-16 RX ADMIN — HYDROCODONE BITARTRATE AND ACETAMINOPHEN 1 TABLET: 5; 325 TABLET ORAL at 09:09

## 2022-09-16 RX ADMIN — RIVAROXABAN 10 MG: 10 TABLET, FILM COATED ORAL at 05:09

## 2022-09-16 RX ADMIN — HYDROCODONE BITARTRATE AND ACETAMINOPHEN 1 TABLET: 5; 325 TABLET ORAL at 05:09

## 2022-09-16 RX ADMIN — HALOPERIDOL 5 MG: 5 TABLET ORAL at 05:09

## 2022-09-16 NOTE — PLAN OF CARE
Problem: Fall Injury Risk  Goal: Absence of Fall and Fall-Related Injury  Outcome: Ongoing, Progressing     Problem: Skin Injury Risk Increased  Goal: Skin Health and Integrity  Outcome: Ongoing, Progressing     Problem: Adult Inpatient Plan of Care  Goal: Patient-Specific Goal (Individualized)  Outcome: Ongoing, Progressing  Flowsheets (Taken 9/15/2022 2237)  Patient-Specific Goals (Include Timeframe): FOR MY BOWELS TO SLOW DOWN SOON

## 2022-09-16 NOTE — PT/OT/SLP PROGRESS
"Physical Therapy         Treatment        Brandyn Ty   MRN: [id number]        PT Start Time: 0907     PT Stop Time: 0931    PT Total Time (min): 24 min       Billable Minutes:  Gait [id number] and Therapeutic Exercise 9  Total Minutes: 24    Treatment Type: Treatment  PT/PTA: PTA     PTA Visit Number: 3       General Precautions: Standard,    Orthopedic Precautions: Orthopedic Precautions : LLE non weight bearing   Braces: Braces: N/A    Spiritual, Cultural Beliefs, Church Practices, Values that Affect Care: no    Subjective:  Communicated with nurse prior to session.  Pt stated he can not go home at this time due to not being able to ambulate up/down his steps at home  Pain/Comfort  Pain Rating 1: 0/10    Objective:  Patient found sitting UIC    Balance:   Static Stand: GOOD: Takes MODERATE challenges from all directions  Dynamic stand: 0: N/A    Transfer Training:  Sit to stand:Stand-by Assistance with Rolling Walker sit to stand from bedside chair, pt is able to maintain NWB LLE    Gait Training:  Patient gait trained NWB: left lower extremity 70  feet on level tile with Rolling Walker with Stand-by Assistance.  Pt with demonstarting a  swing-through gait with decreased pj and decreased step length.Impairments contributing to gait deviations include decreased ROM and chair in tow during to poor endurance    Stair Training:  Pt ascended/descended 4" curb step with Rolling Walker with no railing with Moderate Assistance.   Mod A given for pt to hop up curb/ min A to hop down    Additional Treatment:  Semi supine therex, 10x BLE AROM  RLE- heel slides, SAQ  BLE-ankle pumps, hip abd/add, SLR    Activity Tolerance:  Patient tolerated treatment well    Patient left up in chair with call button in reach.    Assessment:  Brandyn Ty is a 62 y.o. male with a medical diagnosis of Displaced fracture of lateral condyle of left femur, initial encounter for open fracture type IIIA, IIIB, or IIIC. " Pt motivated to work with therapy today and go to the gym to get back to PLOF    Rehab potential is good.    Activity tolerance: Excellent    Discharge recommendations: Discharge Facility/Level of Care Needs: nursing facility, skilled, other (see comments) (vs swing bed)     Equipment recommendations: Equipment Needed After Discharge: walker, rolling     GOALS:   Multidisciplinary Problems       Physical Therapy Goals          Problem: Physical Therapy    Goal Priority Disciplines Outcome Goal Variances Interventions   Physical Therapy Goal     PT, PT/OT Ongoing, Progressing     Description: Goals to be met by: 2022     Patient will increase functional independence with mobility by performin. Supine to sit with Modified [place]  2. Sit to supine with Modified [place]  3. Sit to stand transfer with Mod I   4. Gait  x 200 feet with Modified [place] using Rolling Walker.                          PLAN:    Patient to be seen daily  to address the above listed problems via gait training, therapeutic activities, therapeutic exercises  Plan of Care expires: 10/14/22  Plan of Care reviewed with: patient         2022

## 2022-09-16 NOTE — PROGRESS NOTES
[facility]   PROGRESS NOTE        CHIEF COMPLAINT   Hospital follow up    HOSPITAL COURSE   63 yo AAM with hx Schizophrenia and HTN who presents to the ED after a ground level fall landing on his left knee. He is an extremely poor historian. He sustained a displaced open femur fracture grade 3A vs B.  that warranted immediate surgical intervention. Dr. Chatterjee was consulted and pt underwent I&D and external fixation. The Hospitalist Service was consulted for medical management.    Was also found to have hyponatremia that is improving.  Plan for open reduction and internal fixation revision knee fusion today.  Patient ripped his IV lines last night and also refusing lab work.August 28 it patient had left knee arthrodesis revision and left knee external fixator adjustment  Orthopedic recs are to continue ex fix for 8 weeks and then removal, then begin progressive weight-bearing at that time.    S/p IM Haldol dose 100mg q monthly on 9/6  Unfortunately patient was denied by insurance for rehab or skilled nursing facility.  Change IV antibiotics to p.o. Keflex 500 mg q.6 hours hourly.  To complete a 10 day course for pin site possible infection.       Today  Seen examined this morning.  No new changes at this time.  Awaiting placement.        OBJECTIVE/PHYSICAL EXAM     VITAL SIGNS (MOST RECENT):  Temp: 96.6 °F (35.9 °C) (09/14/22 0900)  Pulse: 92 (09/14/22 0900)  Resp: 16 (09/16/22 0352)  BP: 137/78 (09/14/22 0900)  SpO2: 99 % (09/14/22 0900) VITAL SIGNS (24 HOUR RANGE):  Resp:  [16-20] 16   GENERAL: In no acute distress, afebrile  HEENT:  CHEST: Clear to auscultation bilaterally  HEART: S1, S2, no appreciable murmur  ABDOMEN: Soft, nontender, BS +  MSK: Warm, no lower extremity edema, no clubbing or cyanosis, left lower extremity in a external fixator  NEUROLOGIC: Alert and oriented x4, moving all extremities with good strength    INTEGUMENTARY:  PSYCHIATRY:        ASSESSMENT/PLAN   Possible external fixator site insertion cellulitis   Acute watery diarrhea     History of:  Schizophrenia, essential hypertension, open distal left femur fracture status post I&D and ORIF August 24      Orthopedic surgery signed off.  Follow-up in 2 to 3 weeks with Dr. Chatterjee.  Ex fix x8 weeks.  Recommend Keflex times 10 days.  Case management following.  Pending SNF.  Labs today    DVT prophylaxis:  Xarelto 10    Anticipated discharge and disposition:   __________________________________________________________________________    LABS/MICRO/MEDS/DIAGNOSTICS       LABS  No results for input(s): NA, K, CHLORIDE, CO2, BUN, CREATININE, GLUCOSE, CALCIUM, ALKPHOS, AST, ALT, ALBUMIN in the last 72 hours.  No results for input(s): WBC, RBC, HCT, MCV, PLT in the last 72 hours.    Invalid input(s):     MICROBIOLOGY  Microbiology Results (last 7 days)       ** No results found for the last 168 hours. **            MEDICATIONS   benztropine  2 mg Oral Daily    cephALEXin  500 mg Oral Q6H    haloperidoL  5 mg Oral Daily before evening meal    rivaroxaban  10 mg Oral Daily with dinner      INFUSIONS      DIAGNOSTIC TESTS  SURG FL Surgery Fluoro Usage   Final Result      CTA Lower Extremity Left   Final Result      1. Acute, nondisplaced fracture at the distal femoral metaphysis stabilized by an external fixator device.  This is at the level of the chronically ankylosed knee joint.  Fracture appears open with soft tissue gas extending from the anterior soft tissue defect in to the fracture.   2. No evidence of acute vascular injury.  Some portions of the vasculature are obscured related to beam hardening artifact from external fixator device.  The tip of the lower femoral fixation screw extends to just adjacent to the distal superficial femoral artery without appreciable vascular injury or contact.   3. Two vessel runoff to the ankle.  Posterior tibial artery is diminutive  and appears to be occluded proximally.  This is not definitively acute.   4. Trabeculated, distended bladder.   Minor discrepancy from preliminary report         Electronically signed by: Radha Valdivia   Date:    08/25/2022   Time:    10:13      SURG FL Surgery Fluoro Usage   Final Result      X-Ray Tibia Fibula 2 View Left   Final Result      No orthopedic hardware.         Electronically signed by: Radha Valdivia   Date:    08/24/2022   Time:    18:55      X-Ray Knee 1 or 2 View Left   Final Result      Acute on chronic appearing deformity at the left knee.  There appears to be have been bony fusion across the joint space with acute superimposed fracture versus pseudoarticulation.  There appears to be an anterior laceration.  Correlate for open fracture.         Electronically signed by: Radha Valdivia   Date:    08/24/2022   Time:    18:55      X-Ray Femur 2 View Left   Final Result      Acute on chronic appearing deformity at the left knee.  There appears to be have been bony fusion across the joint space with acute superimposed fracture versus pseudoarticulation.  There appears to be an anterior laceration.  Correlate for open fracture.         Electronically signed by: Radha Valdivia   Date:    08/24/2022   Time:    18:55      RADIOLOGY REPORT   Final Result               All diagnosis and differential diagnosis have been reviewed; assessment and plan has been documented. I have personally reviewed the labs and test results that are presently available; I have reviewed the patients medication list. I have reviewed the consulting providers response and recommendations. I have reviewed or attempted to review medical records based upon their availability.  All of the patient's questions have been addressed and answered. Patient's is agreeable to the above stated plan. I will continue to monitor closely and make adjustments to medical management as needed.  This document was created using [employer] Flu[employer]  Direct.  Transcription errors may have been made.  Please contact me if any questions may rise regarding documentation to clarify verbiage.        Kyler New MD   09/16/2022   Internal Medicine

## 2022-09-17 PROCEDURE — 25000003 PHARM REV CODE 250

## 2022-09-17 PROCEDURE — 25000003 PHARM REV CODE 250: Performed by: NURSE PRACTITIONER

## 2022-09-17 PROCEDURE — 11000001 HC ACUTE MED/SURG PRIVATE ROOM

## 2022-09-17 PROCEDURE — 25000003 PHARM REV CODE 250: Performed by: INTERNAL MEDICINE

## 2022-09-17 RX ADMIN — HYDROCODONE BITARTRATE AND ACETAMINOPHEN 1 TABLET: 5; 325 TABLET ORAL at 01:09

## 2022-09-17 RX ADMIN — HYDROCODONE BITARTRATE AND ACETAMINOPHEN 1 TABLET: 5; 325 TABLET ORAL at 08:09

## 2022-09-17 RX ADMIN — HALOPERIDOL 5 MG: 5 TABLET ORAL at 05:09

## 2022-09-17 RX ADMIN — HYDROCODONE BITARTRATE AND ACETAMINOPHEN 1 TABLET: 5; 325 TABLET ORAL at 05:09

## 2022-09-17 RX ADMIN — BENZTROPINE MESYLATE 2 MG: 1 TABLET ORAL at 08:09

## 2022-09-17 NOTE — PROGRESS NOTES
[facility] MEDICINE   PROGRESS NOTE        CHIEF COMPLAINT   Hospital follow up    HOSPITAL COURSE   63 yo AAM with hx Schizophrenia and HTN who presents to the ED after a ground level fall landing on his left knee. He is an extremely poor historian. He sustained a displaced open femur fracture grade 3A vs B.  that warranted immediate surgical intervention. Dr. Chatterjee was consulted and pt underwent I&D and external fixation. The Hospitalist Service was consulted for medical management.    Was also found to have hyponatremia that is improving.  Plan for open reduction and internal fixation revision knee fusion today.  Patient ripped his IV lines last night and also refusing lab work.August 28 it patient had left knee arthrodesis revision and left knee external fixator adjustment  Orthopedic recs are to continue ex fix for 8 weeks and then removal, then begin progressive weight-bearing at that time.    S/p IM Haldol dose 100mg q monthly on 9/6  Unfortunately patient was denied by insurance for rehab or skilled nursing facility.  Change IV antibiotics to p.o. Keflex 500 mg q.6 hours hourly.  To complete a 10 day course for pin site possible infection.    Today  No new issues at this time.  Doing well.        OBJECTIVE/PHYSICAL EXAM     VITAL SIGNS (MOST RECENT):  Temp: 97.9 °F (36.6 °C) (09/17/22 0400)  Pulse: 85 (09/17/22 0400)  Resp: 16 (09/17/22 0504)  BP: 121/75 (09/17/22 0400)  SpO2: 97 % (09/17/22 0400) VITAL SIGNS (24 HOUR RANGE):  Temp:  [97.9 °F (36.6 °C)] 97.9 °F (36.6 °C)  Pulse:  [85] 85  Resp:  [16-19] 16  SpO2:  [97 %] 97 %  BP: (121)/(75) 121/75   GENERAL: In no acute distress, afebrile  HEENT:  CHEST: Clear to auscultation bilaterally  HEART: S1, S2, no appreciable murmur  ABDOMEN: Soft, nontender, BS +  MSK: Warm, no lower extremity edema, no clubbing or cyanosis, left lower extremity in a external fixator  NEUROLOGIC: Alert and oriented x4, moving all extremities  with good strength   INTEGUMENTARY:  PSYCHIATRY:        ASSESSMENT/PLAN   Possible external fixator site insertion cellulitis   Acute watery diarrhea      History of:  Schizophrenia, essential hypertension, open distal left femur fracture status post I&D and ORIF August 24        Orthopedic surgery signed off.  Follow-up in 2 to 3 weeks with Dr. Chatterjee.  Ex fix x8 weeks.  Recommend Keflex times 10 days.  Case management following.  Pending SNF.  Labs today     DVT prophylaxis:  Xarelto 10    Anticipated discharge and disposition:   __________________________________________________________________________    LABS/MICRO/MEDS/DIAGNOSTICS       LABS  No results for input(s): NA, K, CHLORIDE, CO2, BUN, CREATININE, GLUCOSE, CALCIUM, ALKPHOS, AST, ALT, ALBUMIN in the last 72 hours.  No results for input(s): WBC, RBC, HCT, MCV, PLT in the last 72 hours.    Invalid input(s):     MICROBIOLOGY  Microbiology Results (last 7 days)       ** No results found for the last 168 hours. **            MEDICATIONS   benztropine  2 mg Oral Daily    haloperidoL  5 mg Oral Daily before evening meal    rivaroxaban  10 mg Oral Daily with dinner      INFUSIONS      DIAGNOSTIC TESTS  SURG FL Surgery Fluoro Usage   Final Result      CTA Lower Extremity Left   Final Result      1. Acute, nondisplaced fracture at the distal femoral metaphysis stabilized by an external fixator device.  This is at the level of the chronically ankylosed knee joint.  Fracture appears open with soft tissue gas extending from the anterior soft tissue defect in to the fracture.   2. No evidence of acute vascular injury.  Some portions of the vasculature are obscured related to beam hardening artifact from external fixator device.  The tip of the lower femoral fixation screw extends to just adjacent to the distal superficial femoral artery without appreciable vascular injury or contact.   3. Two vessel runoff to the ankle.  Posterior tibial artery is diminutive and  appears to be occluded proximally.  This is not definitively acute.   4. Trabeculated, distended bladder.   Minor discrepancy from preliminary report         Electronically signed by: Radha Valdivia   Date:    08/25/2022   Time:    10:13      SURG FL Surgery Fluoro Usage   Final Result      X-Ray Tibia Fibula 2 View Left   Final Result      No orthopedic hardware.         Electronically signed by: Radha Valdivia   Date:    08/24/2022   Time:    18:55      X-Ray Knee 1 or 2 View Left   Final Result      Acute on chronic appearing deformity at the left knee.  There appears to be have been bony fusion across the joint space with acute superimposed fracture versus pseudoarticulation.  There appears to be an anterior laceration.  Correlate for open fracture.         Electronically signed by: Radha Valdivia   Date:    08/24/2022   Time:    18:55      X-Ray Femur 2 View Left   Final Result      Acute on chronic appearing deformity at the left knee.  There appears to be have been bony fusion across the joint space with acute superimposed fracture versus pseudoarticulation.  There appears to be an anterior laceration.  Correlate for open fracture.         Electronically signed by: Radha Valdivia   Date:    08/24/2022   Time:    18:55      RADIOLOGY REPORT   Final Result               All diagnosis and differential diagnosis have been reviewed; assessment and plan has been documented. I have personally reviewed the labs and test results that are presently available; I have reviewed the patients medication list. I have reviewed the consulting providers response and recommendations. I have reviewed or attempted to review medical records based upon their availability.  All of the patient's questions have been addressed and answered. Patient's is agreeable to the above stated plan. I will continue to monitor closely and make adjustments to medical management as needed.  This document was created using [email or web address] Fluency  Direct.  Transcription errors may have been made.  Please contact me if any questions may rise regarding documentation to clarify verbiage.        Kyler New MD   09/17/2022   Internal Medicine

## 2022-09-18 PROCEDURE — 11000001 HC ACUTE MED/SURG PRIVATE ROOM

## 2022-09-18 PROCEDURE — 25000003 PHARM REV CODE 250

## 2022-09-18 PROCEDURE — 97530 THERAPEUTIC ACTIVITIES: CPT | Mod: CQ

## 2022-09-18 PROCEDURE — 25000003 PHARM REV CODE 250: Performed by: INTERNAL MEDICINE

## 2022-09-18 PROCEDURE — 25000003 PHARM REV CODE 250: Performed by: NURSE PRACTITIONER

## 2022-09-18 PROCEDURE — 97116 GAIT TRAINING THERAPY: CPT | Mod: CQ

## 2022-09-18 RX ADMIN — HYDROCODONE BITARTRATE AND ACETAMINOPHEN 1 TABLET: 5; 325 TABLET ORAL at 12:09

## 2022-09-18 RX ADMIN — HYDROCODONE BITARTRATE AND ACETAMINOPHEN 1 TABLET: 5; 325 TABLET ORAL at 05:09

## 2022-09-18 RX ADMIN — BENZTROPINE MESYLATE 2 MG: 1 TABLET ORAL at 08:09

## 2022-09-18 RX ADMIN — HYDROCODONE BITARTRATE AND ACETAMINOPHEN 1 TABLET: 5; 325 TABLET ORAL at 11:09

## 2022-09-18 RX ADMIN — HYDROCODONE BITARTRATE AND ACETAMINOPHEN 1 TABLET: 5; 325 TABLET ORAL at 08:09

## 2022-09-18 RX ADMIN — HYDROCODONE BITARTRATE AND ACETAMINOPHEN 1 TABLET: 5; 325 TABLET ORAL at 04:09

## 2022-09-18 RX ADMIN — HALOPERIDOL 5 MG: 5 TABLET ORAL at 04:09

## 2022-09-18 RX ADMIN — HYDROCODONE BITARTRATE AND ACETAMINOPHEN 1 TABLET: 5; 325 TABLET ORAL at 01:09

## 2022-09-18 NOTE — PROGRESS NOTES
[facility] MEDICINE   PROGRESS NOTE        CHIEF COMPLAINT   Hospital follow up    HOSPITAL COURSE   63 yo AAM with hx Schizophrenia and HTN who presents to the ED after a ground level fall landing on his left knee. He is an extremely poor historian. He sustained a displaced open femur fracture grade 3A vs B.  that warranted immediate surgical intervention. Dr. Chatterjee was consulted and pt underwent I&D and external fixation. The Hospitalist Service was consulted for medical management.    Was also found to have hyponatremia that is improving.  Plan for open reduction and internal fixation revision knee fusion today.  Patient ripped his IV lines last night and also refusing lab work.August 28 it patient had left knee arthrodesis revision and left knee external fixator adjustment  Orthopedic recs are to continue ex fix for 8 weeks and then removal, then begin progressive weight-bearing at that time.    S/p IM Haldol dose 100mg q monthly on 9/6  Unfortunately patient was denied by insurance for rehab or skilled nursing facility.  Change IV antibiotics to p.o. Keflex 500 mg q.6 hours hourly.  To complete a 10 day course for pin site possible infection.    Today  No new issues at this time.  He has been refusing Lovenox injection as well as Xarelto pill.  I did discuss with him multiple times today that he is at high risk for DVT/PE blood clot including death from a fatal PE and he stated he does not want it and he does not want to be a Guinea pig.  Discussed with him that these medications are tested and have been on the market for a long time but still refused.        OBJECTIVE/PHYSICAL EXAM     VITAL SIGNS (MOST RECENT):  Temp: 97.9 °F (36.6 °C) (09/17/22 0400)  Pulse: 85 (09/17/22 0400)  Resp: 16 (09/18/22 0505)  BP: 121/75 (09/17/22 0400)  SpO2: 97 % (09/17/22 0400) VITAL SIGNS (24 HOUR RANGE):  Resp:  [16-18] 16   GENERAL: In no acute distress, afebrile  HEENT:  CHEST: Clear to  auscultation bilaterally  HEART: S1, S2, no appreciable murmur  ABDOMEN: Soft, nontender, BS +  MSK: Warm, no lower extremity edema, no clubbing or cyanosis, left lower extremity in a external fixator  NEUROLOGIC: Alert and oriented x4, moving all extremities with good strength   INTEGUMENTARY:  PSYCHIATRY:        ASSESSMENT/PLAN   Possible external fixator site insertion cellulitis   Acute watery diarrhea      History of:  Schizophrenia, essential hypertension, open distal left femur fracture status post I&D and ORIF August 24        Orthopedic surgery signed off.  Follow-up in 2 to 3 weeks with Dr. Chatterjee.  Ex fix x8 weeks.  Recommend Keflex times 10 days.  Case management following.  Pending SNF.  Refused labs and DVT prophylaxis     DVT prophylaxis:  Refusing prophylaxis    Anticipated discharge and disposition:   __________________________________________________________________________    LABS/MICRO/MEDS/DIAGNOSTICS       LABS  No results for input(s): NA, K, CHLORIDE, CO2, BUN, CREATININE, GLUCOSE, CALCIUM, ALKPHOS, AST, ALT, ALBUMIN in the last 72 hours.  No results for input(s): WBC, RBC, HCT, MCV, PLT in the last 72 hours.    Invalid input(s):     MICROBIOLOGY  Microbiology Results (last 7 days)       ** No results found for the last 168 hours. **            MEDICATIONS   benztropine  2 mg Oral Daily    haloperidoL  5 mg Oral Daily before evening meal    rivaroxaban  10 mg Oral Daily with dinner      INFUSIONS      DIAGNOSTIC TESTS  SURG FL Surgery Fluoro Usage   Final Result      CTA Lower Extremity Left   Final Result      1. Acute, nondisplaced fracture at the distal femoral metaphysis stabilized by an external fixator device.  This is at the level of the chronically ankylosed knee joint.  Fracture appears open with soft tissue gas extending from the anterior soft tissue defect in to the fracture.   2. No evidence of acute vascular injury.  Some portions of the vasculature are obscured related to beam  hardening artifact from external fixator device.  The tip of the lower femoral fixation screw extends to just adjacent to the distal superficial femoral artery without appreciable vascular injury or contact.   3. Two vessel runoff to the ankle.  Posterior tibial artery is diminutive and appears to be occluded proximally.  This is not definitively acute.   4. Trabeculated, distended bladder.   Minor discrepancy from preliminary report         Electronically signed by: Radha Valdivia   Date:    08/25/2022   Time:    10:13      SURG FL Surgery Fluoro Usage   Final Result      X-Ray Tibia Fibula 2 View Left   Final Result      No orthopedic hardware.         Electronically signed by: Radha Valdivia   Date:    08/24/2022   Time:    18:55      X-Ray Knee 1 or 2 View Left   Final Result      Acute on chronic appearing deformity at the left knee.  There appears to be have been bony fusion across the joint space with acute superimposed fracture versus pseudoarticulation.  There appears to be an anterior laceration.  Correlate for open fracture.         Electronically signed by: Radha Valdivia   Date:    08/24/2022   Time:    18:55      X-Ray Femur 2 View Left   Final Result      Acute on chronic appearing deformity at the left knee.  There appears to be have been bony fusion across the joint space with acute superimposed fracture versus pseudoarticulation.  There appears to be an anterior laceration.  Correlate for open fracture.         Electronically signed by: Radha Valdivia   Date:    08/24/2022   Time:    18:55      RADIOLOGY REPORT   Final Result               All diagnosis and differential diagnosis have been reviewed; assessment and plan has been documented. I have personally reviewed the labs and test results that are presently available; I have reviewed the patients medication list. I have reviewed the consulting providers response and recommendations. I have reviewed or attempted to review medical records  based upon their availability.  All of the patient's questions have been addressed and answered. Patient's is agreeable to the above stated plan. I will continue to monitor closely and make adjustments to medical management as needed.  This document was created using [employer] Flu[employer] Direct.  Transcription errors may have been made.  Please contact me if any questions may rise regarding documentation to clarify verbiage.        Kyler New MD   09/18/2022   Internal Medicine

## 2022-09-18 NOTE — PT/OT/SLP PROGRESS
Physical Therapy Treatment    Patient Name:  Brandyn Ty   MRN:  [id number]    Recommendations:     Discharge Recommendations:  nursing facility, skilled, other (see comments) (vs swing bed)   Discharge Equipment Recommendations: walker, rolling     Subjective     Patient awake and alert.     Communicated with NSG prior to session to see if Pt is appropriate for therapy today. Pt greeted and agreed to session.    Objective:     General Precautions: Standard,     Orthopedic Precautions:LLE non weight bearing   Braces:    Respiratory Status: Room air     Functional Mobility:    Bed Mobility: Min Assist  Sit to Stand: Min Assist  Transfers: Min Assist  Gait: 85 Ft. Min Assist   Equipment Used: Gait belt, Rolling walker    Assessment:     Brandyn Ty is a 62 y.o. male admitted with a medical diagnosis of Displaced fracture of lateral condyle of left femur, initial encounter for open fracture type IIIA, IIIB, or IIIC.     Treatment Encounter Note:  Patient actively participated with treatment today with no adverse effects. Patient performed Supine to EOB mobility. Pt ambulated 85 ft with rw, hop to gait pattern to maintain nwb to lle. Patient left supine with all lines intact and call button in reach.    Rehab Prognosis: Good; patient would benefit from acute skilled PT services to address these deficits and reach maximum level of function.    Recent Surgery: Procedure(s) (LRB):  arthrodesis  (Left) 21 Days Post-Op  GOALS:   Multidisciplinary Problems       Physical Therapy Goals          Problem: Physical Therapy    Goal Priority Disciplines Outcome Goal Variances Interventions   Physical Therapy Goal     PT, PT/OT Ongoing, Progressing     Description: Goals to be met by: 2022     Patient will increase functional independence with mobility by performin. Supine to sit with Modified [place]  2. Sit to supine with Modified [place]  3. Sit to stand transfer with Mod I   4. Gait  x  200 feet with Modified [place] using Rolling Walker.                          Plan:     During this hospitalization, patient to be seen daily to address the identified rehab impairments via gait training, therapeutic activities, therapeutic exercises and progress toward the following goals:    Plan of Care Expires:  10/14/22    Billable Minutes     Billable Minutes: Gait Training 12 and Therapeutic Activity 12       PT/PTA: PTA     PTA Visit Number: 4 09/18/2022

## 2022-09-19 PROCEDURE — 25000003 PHARM REV CODE 250: Performed by: NURSE PRACTITIONER

## 2022-09-19 PROCEDURE — 25000003 PHARM REV CODE 250: Performed by: INTERNAL MEDICINE

## 2022-09-19 PROCEDURE — 25000003 PHARM REV CODE 250

## 2022-09-19 PROCEDURE — 11000001 HC ACUTE MED/SURG PRIVATE ROOM

## 2022-09-19 PROCEDURE — 97116 GAIT TRAINING THERAPY: CPT | Mod: CQ

## 2022-09-19 PROCEDURE — 97530 THERAPEUTIC ACTIVITIES: CPT

## 2022-09-19 RX ADMIN — ACETAMINOPHEN 650 MG: 325 TABLET ORAL at 06:09

## 2022-09-19 RX ADMIN — HYDROCODONE BITARTRATE AND ACETAMINOPHEN 1 TABLET: 5; 325 TABLET ORAL at 10:09

## 2022-09-19 RX ADMIN — HYDROCODONE BITARTRATE AND ACETAMINOPHEN 1 TABLET: 5; 325 TABLET ORAL at 02:09

## 2022-09-19 RX ADMIN — HYDROCODONE BITARTRATE AND ACETAMINOPHEN 1 TABLET: 5; 325 TABLET ORAL at 06:09

## 2022-09-19 RX ADMIN — HYDROCODONE BITARTRATE AND ACETAMINOPHEN 1 TABLET: 5; 325 TABLET ORAL at 09:09

## 2022-09-19 RX ADMIN — BENZTROPINE MESYLATE 2 MG: 1 TABLET ORAL at 09:09

## 2022-09-19 RX ADMIN — HALOPERIDOL 5 MG: 5 TABLET ORAL at 06:09

## 2022-09-19 RX ADMIN — HYDROCODONE BITARTRATE AND ACETAMINOPHEN 1 TABLET: 5; 325 TABLET ORAL at 05:09

## 2022-09-19 RX ADMIN — RIVAROXABAN 10 MG: 10 TABLET, FILM COATED ORAL at 06:09

## 2022-09-19 NOTE — PROGRESS NOTES
[facility]  Orthopedics  Progress Note    Patient Name: Brandyn Ty  MRN: [id number]  Admission Date: 8/24/2022  Hospital Length of Stay: 26 days  Attending Provider: Alivia Darling MD  Primary Care Provider: Elizabeth Marin MD  Follow-up For: Procedure(s) (LRB):  arthrodesis  (Left)    Post-Operative Day: 22 Day Post-Op  Subjective:     Principal Problem:Displaced fracture of lateral condyle of left femur, initial encounter for open fracture type IIIA, IIIB, or IIIC    Principal Orthopedic Problem: 22 Days Post-Op left revision knee arthrodesis    Interval History: Patient doing well this morning.  Resting comfortably in bed. States no further drainage or redness of his pin sites. No acute complaints this morning.       Review of patient's allergies indicates:   Allergen Reactions    Chlorpromazine      Other reaction(s): Chlorpromazine, Unknown - See comments    Fluphenazine      Other reaction(s): Fluphenazine, Unknown - See comments    Trazodone      Other reaction(s): Trazodone, Unknown - See comments       Current Facility-Administered Medications   Medication    acetaminophen tablet 650 mg    benztropine tablet 2 mg    bisacodyL suppository 10 mg    butalbital-acetaminophen-caffeine -40 mg per tablet 1 tablet    haloperidoL tablet 5 mg    hydrALAZINE injection 10 mg    HYDROcodone-acetaminophen 5-325 mg per tablet 1 tablet    HYDROmorphone (PF) injection 0.4 mg    loperamide capsule 2 mg    magnesium hydroxide 400 mg/5 ml suspension 2,400 mg    morphine injection 4 mg    ondansetron injection 4 mg    ondansetron injection 4 mg    rivaroxaban tablet 10 mg    sodium chloride 0.9% flush 10 mL    sodium chloride 0.9% flush 10 mL    sodium chloride 0.9% flush 10 mL     Objective:     Vital Signs (Most Recent):  Temp: 98.6 °F (37 °C) (09/19/22 0508)  Pulse: 95 (09/19/22 0508)  Resp: 15 (09/19/22 0508)  BP: 125/74 (09/19/22 0508)  SpO2: 95 % (09/19/22 0508) Vital Signs  "(24h Range):  Temp:  [98.1 °F (36.7 °C)-98.9 °F (37.2 °C)] 98.6 °F (37 °C)  Pulse:  [70-95] 95  Resp:  [15-18] 15  SpO2:  [95 %-99 %] 95 %  BP: (109-125)/(70-74) 125/74     Weight: 83.9 kg (185 lb)  Height: 5' 10" (177.8 cm)  Body mass index is 26.54 kg/m².      Intake/Output Summary (Last 24 hours) at 9/19/2022 0754  Last data filed at 9/19/2022 0400  Gross per 24 hour   Intake 150 ml   Output 1300 ml   Net -1150 ml         Physical Exam:   Musculoskeletal:     Left lower extremity: ex-fix to the LLE remains in place. Proximal pin site with minimal serous drainage, no expressible purulence. Soft dressing in place to the left knee and lower leg without obvious signs of drainage; erythema surrounding the proximal thigh mostly resolved as compared to previous exam; compartments are soft and compressible; No pain with ROM at the ankle or hip. No range of motion at the knee; moderate tenderness to palpation; dorsi/plantar flexes the foot; SILT distally; BCR distally; DP pulse palpable      Diagnostic Findings:   Significant Labs:   Recent Lab Results       None             Significant Imaging: I have reviewed and interpreted all pertinent imaging results/findings.     Assessment/Plan:     Active Diagnoses:    Diagnosis Date Noted POA    PRINCIPAL PROBLEM:  Displaced fracture of lateral condyle of left femur, initial encounter for open fracture type IIIA, IIIB, or IIIC [S72.422C] 08/24/2022 Yes    Type III open displaced fracture of medial condyle of left tibia with routine healing [S82.132F] 08/24/2022 Not Applicable      Problems Resolved During this Admission:     Stable with no further signs of infection this morning.   Remain NWB to the LLE. No range of motion to the left knee. Ex fix to remain in place x 5 more weeks followed by removal and progressive weight bearing at that time.   Lovenox for DVT ppx during stay.   Continue with oral antibiotics as comfortable  Sutures/staples out today.   Follow up with Dr. Chatterjee " in 2-3 weeks for repeat x-rays and continued management.     The above findings, diagnostics, and treatment plan were discussed with Dr. Chatterjee who is in agreement with the plan of care except as stated in additional documentation.       Alexandra Blair Lemaire, PA-C [facility]   Orthopedic Trauma

## 2022-09-19 NOTE — PT/OT/SLP PROGRESS
Physical Therapy Treatment    Patient Name:  Brandyn Ty   MRN:  [id number]    Recommendations:     Discharge Recommendations:  nursing facility, skilled   Discharge Equipment Recommendations: walker, rolling   Barriers to discharge:  acuity of illness    Assessment:     Brandyn Ty is a 62 y.o. male admitted with a medical diagnosis of Displaced fracture of lateral condyle of left femur, initial encounter for open fracture type IIIA, IIIB, or IIIC.  He presents with the following impairments/functional limitations:  weakness, impaired endurance, impaired self care skills, impaired functional mobility, gait instability, decreased lower extremity function, decreased ROM, orthopedic precautions.    Rehab Prognosis: Good; patient would benefit from acute skilled PT services to address these deficits and reach maximum level of function.    Recent Surgery: Procedure(s) (LRB):  arthrodesis  (Left) 22 Days Post-Op    Plan:     During this hospitalization, patient to be seen daily (/BID) to address the identified rehab impairments via gait training, therapeutic activities, therapeutic exercises, neuromuscular re-education and progress toward the following goals:    Plan of Care Expires:  10/19/22    Subjective     Chief Complaint: none  Patient/Family Comments/goals: to be independent  Pain/Comfort:  Pain Rating 1: 0/10      Objective:     Communicated with RN prior to session.  Patient found HOB elevated with peripheral IV upon PT entry to room.     General Precautions: Standard,     Orthopedic Precautions:LLE non weight bearing   Braces: N/A  Respiratory Status: Room air     Functional Mobility:  Bed Mobility:     Supine to Sit: stand by assistance  Transfers:     Sit to Stand:  stand by assistance with rolling walker  Gait: pt ambulated 50 ft with a hop to gt pattern w/ a RW and CGA       AM-PAC 6 CLICK MOBILITY  Turning over in bed (including adjusting bedclothes, sheets and blankets)?: 4  Sitting  down on and standing up from a chair with arms (e.g., wheelchair, bedside commode, etc.): 3  Moving from lying on back to sitting on the side of the bed?: 3  Moving to and from a bed to a chair (including a wheelchair)?: 3  Need to walk in hospital room?: 3  Climbing 3-5 steps with a railing?: 3  Basic Mobility Total Score: 19       Patient left up in chair with all lines intact and call button in reach..    GOALS:   Multidisciplinary Problems       Physical Therapy Goals          Problem: Physical Therapy    Goal Priority Disciplines Outcome Goal Variances Interventions   Physical Therapy Goal     PT, PT/OT Ongoing, Progressing     Description: Goals to be met by: 2022     Patient will increase functional independence with mobility by performin. Supine to sit with Modified [place]  2. Sit to supine with Modified [place]  3. Sit to stand transfer with Mod I   4. Gait  x 200 feet with Modified [place] using Rolling Walker.                          Time Tracking:     PT Received On: 22  PT Start Time: 931     PT Stop Time: 941  PT Total Time (min): 10 min     Billable Minutes: Therapeutic Activity 10 minutes    Treatment Type: Treatment  PT/PTA: PT     PTA Visit Number: 5     2022

## 2022-09-19 NOTE — PLAN OF CARE
Meera the specialist confirms she got the level II today and will be calling [facility] in the am to see if they will offer a bed. If they agree to take she will ask them if pt will need a covid test

## 2022-09-19 NOTE — PT/OT/SLP PROGRESS
Physical Therapy         Treatment        Brandyn Ty   MRN: [id number]     PT Received On: 22  PT Start Time: 1359     PT Stop Time: 1415    PT Total Time (min): 16 min       Billable Minutes:  Gait Training9  Total Minutes: 9             PTA Visit Number: 5       General Precautions: Standard,    Orthopedic Precautions: Orthopedic Precautions : LLE non weight bearing   Braces: Braces: N/A    Spiritual, Cultural Beliefs, Taoism Practices, Values that Affect Care: no      Objective:  Patient found in chair upon entry.    Balance:     Transitional sit-to-stand: SBA with RW    Gait Training: SBA with RW   Pt amb 184ft with hop to gait pattern. Pt presents with steady pj and maintained NWB to LLE. Pt then amb to bathroom commode then back to chair.     Activity Tolerance:  Patient tolerated treatment well    Patient left up in chair with call button in reach.    Assessment:  Brandyn Ty is a 62 y.o. male with a medical diagnosis of Displaced fracture of lateral condyle of left femur, initial encounter for open fracture type IIIA, IIIB, or IIIC.     Rehab potential is excellent.    Activity tolerance: Excellent    Discharge recommendations:       Equipment recommendations:       GOALS:   Multidisciplinary Problems       Physical Therapy Goals          Problem: Physical Therapy    Goal Priority Disciplines Outcome Goal Variances Interventions   Physical Therapy Goal     PT, PT/OT Ongoing, Progressing     Description: Goals to be met by: 2022     Patient will increase functional independence with mobility by performin. Supine to sit with Modified [place]  2. Sit to supine with Modified [place]  3. Sit to stand transfer with Mod I   4. Gait  x 200 feet with Modified [place] using Rolling Walker.                          PLAN:    Patient to be seen daily (/BID)  to address the above listed problems via gait training, therapeutic activities, therapeutic exercises,  neuromuscular re-education  Plan of Care expires: 10/19/22  Plan of Care reviewed with: patient         9/19/2022

## 2022-09-19 NOTE — PROGRESS NOTES
[facility] MEDICINE   PROGRESS NOTE        CHIEF COMPLAINT   Hospital follow up    HOSPITAL COURSE   61 yo AAM with hx Schizophrenia and HTN who presents to the ED after a ground level fall landing on his left knee. He is an extremely poor historian. He sustained a displaced open femur fracture grade 3A vs B.  that warranted immediate surgical intervention. Dr. Chatterjee was consulted and pt underwent I&D and external fixation. The Hospitalist Service was consulted for medical management.    Was also found to have hyponatremia that is improving.  Plan for open reduction and internal fixation revision knee fusion today.  Patient ripped his IV lines last night and also refusing lab work.August 28 it patient had left knee arthrodesis revision and left knee external fixator adjustment  Orthopedic recs are to continue ex fix for 8 weeks and then removal, then begin progressive weight-bearing at that time.    S/p IM Haldol dose 100mg q monthly on 9/6  Unfortunately patient was denied by insurance for rehab or skilled nursing facility.  Change IV antibiotics to p.o. Keflex 500 mg q.6 hours hourly.  To complete a 10 day course for pin site possible infection.    Today  No new issues at this time.  Doing well.  Of course refusing vitals lab work and his DVT prophylaxis.        OBJECTIVE/PHYSICAL EXAM     VITAL SIGNS (MOST RECENT):  Temp: 98.6 °F (37 °C) (09/19/22 0508)  Pulse: 95 (09/19/22 0508)  Resp: 15 (09/19/22 0508)  BP: 125/74 (09/19/22 0508)  SpO2: 95 % (09/19/22 0508) VITAL SIGNS (24 HOUR RANGE):  Temp:  [98.1 °F (36.7 °C)-98.9 °F (37.2 °C)] 98.6 °F (37 °C)  Pulse:  [70-95] 95  Resp:  [15-18] 15  SpO2:  [95 %-99 %] 95 %  BP: (109-125)/(70-74) 125/74   GENERAL: In no acute distress, afebrile  HEENT:  CHEST: Clear to auscultation bilaterally  HEART: S1, S2, no appreciable murmur  ABDOMEN: Soft, nontender, BS +  MSK: Warm, no lower extremity edema, no clubbing or cyanosis, left lower  extremity in a external fixator  NEUROLOGIC: Alert and oriented x4, moving all extremities with good strength   INTEGUMENTARY:  PSYCHIATRY:        ASSESSMENT/PLAN   Possible external fixator site insertion cellulitis   Acute watery diarrhea   Open distal left femur fracture status post I&D and ORIF August 24     History of:  Schizophrenia, essential hypertension, open distal left femur fracture status post I&D and ORIF August 24        Orthopedic surgery signed off.  Follow-up in 2 to 3 weeks with Dr. Chatterjee.  Ex fix x8 weeks.  Completed antibiotics.  Case management following.  Pending SNF.  Refused vitals, labs, and DVT prophylaxis     DVT prophylaxis:  Refusing prophylaxis    Anticipated discharge and disposition:   __________________________________________________________________________    LABS/MICRO/MEDS/DIAGNOSTICS       LABS  No results for input(s): NA, K, CHLORIDE, CO2, BUN, CREATININE, GLUCOSE, CALCIUM, ALKPHOS, AST, ALT, ALBUMIN in the last 72 hours.  No results for input(s): WBC, RBC, HCT, MCV, PLT in the last 72 hours.    Invalid input(s):     MICROBIOLOGY  Microbiology Results (last 7 days)       ** No results found for the last 168 hours. **            MEDICATIONS   benztropine  2 mg Oral Daily    haloperidoL  5 mg Oral Daily before evening meal    rivaroxaban  10 mg Oral Daily with dinner      INFUSIONS      DIAGNOSTIC TESTS  SURG FL Surgery Fluoro Usage   Final Result      CTA Lower Extremity Left   Final Result      1. Acute, nondisplaced fracture at the distal femoral metaphysis stabilized by an external fixator device.  This is at the level of the chronically ankylosed knee joint.  Fracture appears open with soft tissue gas extending from the anterior soft tissue defect in to the fracture.   2. No evidence of acute vascular injury.  Some portions of the vasculature are obscured related to beam hardening artifact from external fixator device.  The tip of the lower femoral fixation screw extends  to just adjacent to the distal superficial femoral artery without appreciable vascular injury or contact.   3. Two vessel runoff to the ankle.  Posterior tibial artery is diminutive and appears to be occluded proximally.  This is not definitively acute.   4. Trabeculated, distended bladder.   Minor discrepancy from preliminary report         Electronically signed by: Radha Valdivia   Date:    08/25/2022   Time:    10:13      SURG FL Surgery Fluoro Usage   Final Result      X-Ray Tibia Fibula 2 View Left   Final Result      No orthopedic hardware.         Electronically signed by: Radha Valdivia   Date:    08/24/2022   Time:    18:55      X-Ray Knee 1 or 2 View Left   Final Result      Acute on chronic appearing deformity at the left knee.  There appears to be have been bony fusion across the joint space with acute superimposed fracture versus pseudoarticulation.  There appears to be an anterior laceration.  Correlate for open fracture.         Electronically signed by: Radha Valdivia   Date:    08/24/2022   Time:    18:55      X-Ray Femur 2 View Left   Final Result      Acute on chronic appearing deformity at the left knee.  There appears to be have been bony fusion across the joint space with acute superimposed fracture versus pseudoarticulation.  There appears to be an anterior laceration.  Correlate for open fracture.         Electronically signed by: Radha Valdivia   Date:    08/24/2022   Time:    18:55      RADIOLOGY REPORT   Final Result               All diagnosis and differential diagnosis have been reviewed; assessment and plan has been documented. I have personally reviewed the labs and test results that are presently available; I have reviewed the patients medication list. I have reviewed the consulting providers response and recommendations. I have reviewed or attempted to review medical records based upon their availability.  All of the patient's questions have been addressed and answered.  Patient's is agreeable to the above stated plan. I will continue to monitor closely and make adjustments to medical management as needed.  This document was created using M*Modal Fluency Direct.  Transcription errors may have been made.  Please contact me if any questions may rise regarding documentation to clarify verbiage.        Kyler New MD   09/19/2022   Internal Medicine

## 2022-09-20 PROCEDURE — 25000003 PHARM REV CODE 250: Performed by: NURSE PRACTITIONER

## 2022-09-20 PROCEDURE — 25000003 PHARM REV CODE 250: Performed by: INTERNAL MEDICINE

## 2022-09-20 PROCEDURE — 97530 THERAPEUTIC ACTIVITIES: CPT

## 2022-09-20 PROCEDURE — 25000003 PHARM REV CODE 250

## 2022-09-20 PROCEDURE — 97110 THERAPEUTIC EXERCISES: CPT

## 2022-09-20 PROCEDURE — 11000001 HC ACUTE MED/SURG PRIVATE ROOM

## 2022-09-20 PROCEDURE — 97164 PT RE-EVAL EST PLAN CARE: CPT

## 2022-09-20 RX ADMIN — HYDROCODONE BITARTRATE AND ACETAMINOPHEN 1 TABLET: 5; 325 TABLET ORAL at 06:09

## 2022-09-20 RX ADMIN — HYDROCODONE BITARTRATE AND ACETAMINOPHEN 1 TABLET: 5; 325 TABLET ORAL at 01:09

## 2022-09-20 RX ADMIN — HALOPERIDOL 5 MG: 5 TABLET ORAL at 04:09

## 2022-09-20 RX ADMIN — BENZTROPINE MESYLATE 2 MG: 1 TABLET ORAL at 08:09

## 2022-09-20 NOTE — PT/OT/SLP PROGRESS
"Occupational Therapy  Treatment    Brandyn Ty   MRN: [id number]   Admitting Diagnosis: Displaced fracture of lateral condyle of left femur, initial encounter for open fracture type IIIA, IIIB, or IIIC    OT Date of Treatment: 09/20/22   OT Start Time: 1400  OT Stop Time: 1425  OT Total Time (min): 25 min    Billable Minutes:  Therapeutic Activity 15 and Therapeutic Exercise 10    OT/IAVNNA: OT     IVANNA Visit Number: 4    General Precautions: Standard, fall  Orthopedic Precautions: LLE non weight bearing  Braces:    Respiratory Status: Room air         Subjective:    Pt seated in recliner chair at initiation of tx, agreeable to therapy activities/ UB thera ex.  Pain/Comfort  Pain Rating 1: 0/10    Objective:  Patient found with: external fixator, peripheral IV     Functional Mobility:       Transfers:  SBA sit to stand from recliner chair, CGA from toilet        Functional Ambulation: SBA using RW to ambulate in [place] with good maintenance of LLE NWB precautions with no cues needed    Activities of Daily Living:     Pt toileted with mod I, did not perform hygiene d/t urine only.  Pt brushed teeth in sitting with setup.      Therapeutic Activities and Exercises:  Transfer training with SBA/CGA using RW and maintaining WB precautions, ambulated 50 ft in mc[place] using RW with SBA.  Pt completed 10 reps x3 sets o/h presses, chest presses with 5# dowel, and 5#/UE biceps curls with good endurance.    AM-PAC 6 CLICK ADL   How much help from another person does this patient currently need?   1 = Unable, Total/Dependent Assistance  2 = A lot, Maximum/Moderate Assistance  3 = A little, Minimum/Contact Guard/Supervision  4 = None, Modified [place]/Independent          AM-PAC Raw Score CMS "G-Code Modifier Level of Impairment Assistance   6 % Total / Unable   7 - 8 CM 80 - 100% Maximal Assist   9-13 CL 60 - 80% Moderate Assist   14 - 19 CK 40 - 60% Moderate Assist   20 - 22 CJ 20 - 40% Minimal Assist   23 CI 1-20% " SBA / CGA   24 CH 0% Independent/ Mod I       Patient left up in chair with call button in reach    ASSESSMENT:  Brandyn Ty is a 62 y.o. male with a medical diagnosis of Displaced fracture of lateral condyle of left femur, initial encounter for open fracture type IIIA, IIIB, or IIIC and presents with mobility limitations d/t external fixator and LLE NWB precautions.    Rehab identified problem list/impairments: Rehab identified problem list/impairments: impaired endurance, impaired functional mobility, impaired self care skills, decreased lower extremity function, decreased safety awareness, pain, decreased ROM, orthopedic precautions    Rehab potential is good.    Activity tolerance: Excellent    Discharge recommendations: Discharge Facility/Level of Care Needs: nursing facility, skilled     Barriers to discharge:      Equipment recommendations: walker, rolling, bedside commode     GOALS:   Multidisciplinary Problems       Occupational Therapy Goals          Problem: Occupational Therapy    Goal Priority Disciplines Outcome Interventions   Occupational Therapy Goal     OT, PT/OT Ongoing, Progressing    Description: Goals to be met by: 9/23    Patient will increase functional independence with ADLs by performing:    LE Dressing with Modified [place].  Grooming while standing at sink with Modified [place].  Toileting from toilet with Modified [place] for hygiene and clothing management.   Toilet transfer to toilet with Modified [place].                         Plan:  Patient to be seen 3 x/week to address the above listed problems via self-care/home management, therapeutic activities, therapeutic exercises  Plan of Care expires: 09/23/22  Plan of Care reviewed with: patient         09/20/2022

## 2022-09-20 NOTE — PT/OT/SLP RE-EVAL
Physical Therapy Re-evaluation    Patient Name:  Brandyn Ty   MRN:  [id number]    Recommendations:     Discharge Recommendations:  nursing facility, skilled   Discharge Equipment Recommendations: walker, rolling   Barriers to discharge:  decreased caregiver support    Assessment:     Brandyn Ty is a 62 y.o. male admitted with a medical diagnosis of Displaced fracture of lateral condyle of left femur, initial encounter for open fracture type IIIA, IIIB, or IIIC.  He presents with the following impairments/functional limitations:  weakness, impaired endurance, impaired functional mobility, gait instability, impaired balance, decreased lower extremity function, pain, orthopedic precautions. Pt is making good progress w/ functional mobility. Pt occasionally requires Vcs for safety w/ ex-fixator getting stuck on RW.      Rehab Prognosis:  good; patient would benefit from acute skilled PT services to address these deficits and reach maximum level of function.      Recent Surgery: Procedure(s) (LRB):  arthrodesis  (Left) 23 Days Post-Op    Plan:     During this hospitalization, patient to be seen daily to address the above listed problems via gait training, therapeutic activities, therapeutic exercises, neuromuscular re-education  Plan of Care Expires:  10/20/22  Plan of Care Reviewed with: patient    Subjective     Communicated with RN prior to session.  Patient found HOB elevated with external fixator, peripheral IV upon PT entry to room, agreeable to evaluation.      Chief Complaint: pain, dizziness  Patient comments/goals: to be independent  Pain/Comfort:  Pain Rating 1: 4/10  Location - Side 1: Right  Location 1: shoulder  Pain Addressed 1: Nurse notified    Patients cultural, spiritual, Temple conflicts given the current situation: no      Objective:     Patient found with: external fixator, peripheral IV     General Precautions: Standard,     Orthopedic Precautions:LLE non weight bearing    Braces: N/A  Respiratory Status: Room air    Exams:  Cognitive Exam:  Patient is oriented to Person, Place, Time, and Situation    Functional Mobility:  Bed Mobility:     Supine to Sit: stand by assistance  Transfers:     Sit to Stand:  stand by assistance with rolling walker  Gait: pt demo'd a slow hop to gt pattern x 160 ft w/ use of RW and CGA.     AM-PAC 6 CLICK MOBILITY  Total Score:20         Patient left up in chair with all lines intact, call button in reach, and RN notified.    GOALS:   Multidisciplinary Problems       Physical Therapy Goals          Problem: Physical Therapy    Goal Priority Disciplines Outcome Goal Variances Interventions   Physical Therapy Goal     PT, PT/OT Ongoing, Progressing     Description: Goals to be met by: 2022     Patient will increase functional independence with mobility by performin. Supine to sit with Modified [place]  2. Sit to supine with Modified [place]  3. Sit to stand transfer with Mod I   4. Gait  x 200 feet with Modified [place] using Rolling Walker.                          History:     Past Medical History:   Diagnosis Date    Anxiety     Hypertension        Past Surgical History:   Procedure Laterality Date    APPLICATION OF LARGE EXTERNAL FIXATION DEVICE TO TIBIA Left 2022    Procedure: APPLICATION, EXTERNAL FIXATION DEVICE, LARGE, TIBIA;  Surgeon: Shawn Chatterjee DO;  Location: [facility];  Service: Orthopedics;  Laterality: Left;       Time Tracking:     PT Received On: 22  PT Start Time: 1012     PT Stop Time: 1027  PT Total Time (min): 15 min     Billable Minutes: Re-eval 15 mins      2022

## 2022-09-20 NOTE — PROGRESS NOTES
"Inpatient Nutrition Evaluation    Admit Date: 8/24/2022   Nutrition Recommendation/Prescription     Continue regular diet as tolerated  Continue medical management of diarrhea    Nutrition Assessment     Chart Review    Reason Seen: length of stay    Diagnosis: Displaced fracture of lateral condyle of left femur, initial encounter for open fracture type IIIA, IIIB, or IIIC      Relevant Medical History: Schizophrenia, HTN     Nutrition-Related Medications: NaCl, zofran PRN    Nutrition-Related Labs: 8/30: WBC-13.2, RBC-3.25, H/H-9.6/30.9, glu-80  9/6: no recent labs  9/13: no recent labs  9/20: no recent labs      Diet Order: Diet Adult Regular  Oral Supplement Order: none at this time  Appetite/Oral Intake: good/% of meals  Factors Affecting Nutritional Intake: diarrhea  Food/Buddhist/Cultural Preferences: none reported    Skin Integrity: incision  Wound(s):   n/a    Comments    8/30: pt reports good appetite, with constipation. UBW reported 185 lb with no weight loss.Admit weight of 83.9 kg (185 lb) on 8/24 and 8/25.    9/6: good appetite, eating 100% of most meals    9/13: pt sleeping; MD noted pt having diarrhea    9/20: pt sitting in his chair, just finished lunch, ate a sandwich and drank a coke and a sprite for lunch, good appetite, tolerating diet    Anthropometrics    Height: 5' 10" (177.8 cm)    Last Weight: 83.9 kg (185 lb) (08/25/22 2015) Weight Method: Bed Scale  BMI (Calculated): 26.5  BMI Classification: overweight (BMI 25-29.9)        Ideal Body Weight (IBW), Male: 166 lb  % Ideal Body Weight, Male (lb): 111.45 %                   Wt Readings from Last 5 Encounters:   08/25/22 83.9 kg (185 lb)   02/06/20 86.6 kg (191 lb)   11/11/19 87.6 kg (193 lb 3.2 oz)   10/23/19 86 kg (189 lb 9.6 oz)   08/22/19 87.5 kg (193 lb)     Weight Change(s) Since Admission:  Admit Weight: 83.9 kg (185 lb) (08/24/22 2115)  9/13: no updated weight    Patient Education    Not applicable.    Monitoring & Evaluation "     Dietitian will monitor food and beverage intake and weight change.  Nutrition Risk/Follow-Up: low (follow-up in 5-7 days)  Patients assigned 'low nutrition risk' status do not qualify for a full nutritional assessment but will be monitored and re-evaluated in a 5-7 day time period.

## 2022-09-20 NOTE — PROGRESS NOTES
[facility]   PROGRESS NOTE        CHIEF COMPLAINT   Hospital follow up    HOSPITAL COURSE   61 yo AAM with hx Schizophrenia and HTN who presents to the ED after a ground level fall landing on his left knee. He is an extremely poor historian. He sustained a displaced open femur fracture grade 3A vs B.  that warranted immediate surgical intervention. Dr. Chatterjee was consulted and pt underwent I&D and external fixation. The Hospitalist Service was consulted for medical management.    Was also found to have hyponatremia that is improving.  Plan for open reduction and internal fixation revision knee fusion today.  Patient ripped his IV lines last night and also refusing lab work.August 28 it patient had left knee arthrodesis revision and left knee external fixator adjustment  Orthopedic recs are to continue ex fix for 8 weeks and then removal, then begin progressive weight-bearing at that time.    S/p IM Haldol dose 100mg q monthly on 9/6  Unfortunately patient was denied by insurance for rehab or skilled nursing facility.  Change IV antibiotics to p.o. Keflex 500 mg q.6 hours hourly.  To complete a 10 day course for pin site possible infection.    Today  No new issues at this time.  Pending placement.        OBJECTIVE/PHYSICAL EXAM     VITAL SIGNS (MOST RECENT):  Temp: 97.9 °F (36.6 °C) (09/20/22 0600)  Pulse: 75 (09/20/22 0600)  Resp: 16 (09/20/22 0635)  BP: 115/76 (09/20/22 0600)  SpO2: 97 % (09/20/22 0600) VITAL SIGNS (24 HOUR RANGE):  Temp:  [97.9 °F (36.6 °C)] 97.9 °F (36.6 °C)  Pulse:  [75] 75  Resp:  [15-18] 16  SpO2:  [97 %] 97 %  BP: (115)/(76) 115/76   GENERAL: In no acute distress, afebrile  HEENT:  CHEST: Clear to auscultation bilaterally  HEART: S1, S2, no appreciable murmur  ABDOMEN: Soft, nontender, BS +  MSK: Warm, no lower extremity edema, no clubbing or cyanosis, left lower extremity in a external fixator  NEUROLOGIC: Alert and oriented x4, moving all  extremities with good strength   INTEGUMENTARY:  PSYCHIATRY:        ASSESSMENT/PLAN   Possible external fixator site insertion cellulitis   Acute watery diarrhea   Open distal left femur fracture status post I&D and ORIF August 24     History of:  Schizophrenia, essential hypertension, open distal left femur fracture status post I&D and ORIF August 24        Orthopedic surgery signed off.  Follow-up in 2 to 3 weeks with Dr. Chatterjee.  Ex fix x8 weeks.  Completed antibiotics.  Case management following.  Pending SNF.  Refused vitals, labs, and DVT prophylaxis     DVT prophylaxis:  Refusing prophylaxis    Anticipated discharge and disposition:   __________________________________________________________________________    LABS/MICRO/MEDS/DIAGNOSTICS       LABS  No results for input(s): NA, K, CHLORIDE, CO2, BUN, CREATININE, GLUCOSE, CALCIUM, ALKPHOS, AST, ALT, ALBUMIN in the last 72 hours.  No results for input(s): WBC, RBC, HCT, MCV, PLT in the last 72 hours.    Invalid input(s):     MICROBIOLOGY  Microbiology Results (last 7 days)       ** No results found for the last 168 hours. **            MEDICATIONS   benztropine  2 mg Oral Daily    haloperidoL  5 mg Oral Daily before evening meal    rivaroxaban  10 mg Oral Daily with dinner      INFUSIONS      DIAGNOSTIC TESTS  SURG FL Surgery Fluoro Usage   Final Result      CTA Lower Extremity Left   Final Result      1. Acute, nondisplaced fracture at the distal femoral metaphysis stabilized by an external fixator device.  This is at the level of the chronically ankylosed knee joint.  Fracture appears open with soft tissue gas extending from the anterior soft tissue defect in to the fracture.   2. No evidence of acute vascular injury.  Some portions of the vasculature are obscured related to beam hardening artifact from external fixator device.  The tip of the lower femoral fixation screw extends to just adjacent to the distal superficial femoral artery without appreciable  vascular injury or contact.   3. Two vessel runoff to the ankle.  Posterior tibial artery is diminutive and appears to be occluded proximally.  This is not definitively acute.   4. Trabeculated, distended bladder.   Minor discrepancy from preliminary report         Electronically signed by: Radha Valdivia   Date:    08/25/2022   Time:    10:13      SURG FL Surgery Fluoro Usage   Final Result      X-Ray Tibia Fibula 2 View Left   Final Result      No orthopedic hardware.         Electronically signed by: Radha Valdivia   Date:    08/24/2022   Time:    18:55      X-Ray Knee 1 or 2 View Left   Final Result      Acute on chronic appearing deformity at the left knee.  There appears to be have been bony fusion across the joint space with acute superimposed fracture versus pseudoarticulation.  There appears to be an anterior laceration.  Correlate for open fracture.         Electronically signed by: Radha Valdivia   Date:    08/24/2022   Time:    18:55      X-Ray Femur 2 View Left   Final Result      Acute on chronic appearing deformity at the left knee.  There appears to be have been bony fusion across the joint space with acute superimposed fracture versus pseudoarticulation.  There appears to be an anterior laceration.  Correlate for open fracture.         Electronically signed by: Radha Valdivia   Date:    08/24/2022   Time:    18:55      RADIOLOGY REPORT   Final Result               All diagnosis and differential diagnosis have been reviewed; assessment and plan has been documented. I have personally reviewed the labs and test results that are presently available; I have reviewed the patients medication list. I have reviewed the consulting providers response and recommendations. I have reviewed or attempted to review medical records based upon their availability.  All of the patient's questions have been addressed and answered. Patient's is agreeable to the above stated plan. I will continue to monitor closely  and make adjustments to medical management as needed.  This document was created using M*Modal Fluency Direct.  Transcription errors may have been made.  Please contact me if any questions may rise regarding documentation to clarify verbiage.        Kyler New MD   09/20/2022   Internal Medicine

## 2022-09-20 NOTE — PLAN OF CARE
Spoke with  meera palma on speaker in pts room to art. Alyce came and assessed and pt told him he didn't want to go anywhere where there were wheelchairs. Pt states his issue was that they were talking that it was a forever placement and he didn't want foreever. Meera will call Alyce because she was working with them on skilled. She will update me after she talks with Alyce and pt will be updated.

## 2022-09-21 PROCEDURE — 94761 N-INVAS EAR/PLS OXIMETRY MLT: CPT

## 2022-09-21 PROCEDURE — 25000003 PHARM REV CODE 250: Performed by: INTERNAL MEDICINE

## 2022-09-21 PROCEDURE — 25000003 PHARM REV CODE 250

## 2022-09-21 PROCEDURE — 11000001 HC ACUTE MED/SURG PRIVATE ROOM

## 2022-09-21 PROCEDURE — 97110 THERAPEUTIC EXERCISES: CPT | Mod: CQ

## 2022-09-21 RX ADMIN — HALOPERIDOL 5 MG: 5 TABLET ORAL at 05:09

## 2022-09-21 RX ADMIN — BENZTROPINE MESYLATE 2 MG: 1 TABLET ORAL at 08:09

## 2022-09-21 RX ADMIN — RIVAROXABAN 10 MG: 10 TABLET, FILM COATED ORAL at 05:09

## 2022-09-21 NOTE — PROGRESS NOTES
[facility] MEDICINE   PROGRESS NOTE        CHIEF COMPLAINT   Hospital follow up    HOSPITAL COURSE   63 yo AAM with hx Schizophrenia and HTN who presents to the ED after a ground level fall landing on his left knee. He is an extremely poor historian. He sustained a displaced open femur fracture grade 3A vs B.  that warranted immediate surgical intervention. Dr. Chatterjee was consulted and pt underwent I&D and external fixation. The Hospitalist Service was consulted for medical management.    Was also found to have hyponatremia that is improving.  Plan for open reduction and internal fixation revision knee fusion today.  Patient ripped his IV lines last night and also refusing lab work.August 28 it patient had left knee arthrodesis revision and left knee external fixator adjustment  Orthopedic recs are to continue ex fix for 8 weeks and then removal, then begin progressive weight-bearing at that time.    S/p IM Haldol dose 100mg q monthly on 9/6  Unfortunately patient was denied by insurance for rehab or skilled nursing facility.  Change IV antibiotics to p.o. Keflex 500 mg q.6 hours hourly.  To complete a 10 day course for pin site possible infection.    Today  No new issues at this time.  Pending placement.  Sleeping in his chair today.        OBJECTIVE/PHYSICAL EXAM     VITAL SIGNS (MOST RECENT):  Temp: 97.9 °F (36.6 °C) (09/20/22 0600)  Pulse: 75 (09/20/22 0600)  Resp: 16 (09/20/22 0635)  BP: 115/76 (09/20/22 0600)  SpO2: 97 % (09/20/22 0600) VITAL SIGNS (24 HOUR RANGE):      GENERAL: In no acute distress, afebrile  HEENT:  CHEST: Clear to auscultation bilaterally  HEART: S1, S2, no appreciable murmur  ABDOMEN: Soft, nontender, BS +  MSK: Warm, no lower extremity edema, no clubbing or cyanosis, left lower extremity in a external fixator  NEUROLOGIC: Alert and oriented x4, moving all extremities with good strength   INTEGUMENTARY:  PSYCHIATRY:        ASSESSMENT/PLAN   Possible  external fixator site insertion cellulitis   Acute watery diarrhea   Open distal left femur fracture status post I&D and ORIF August 24     History of:  Schizophrenia, essential hypertension, open distal left femur fracture status post I&D and ORIF August 24        Orthopedic surgery signed off.  Follow-up in 2 to 3 weeks with Dr. Chatterjee.  Ex fix x8 weeks.  Completed antibiotics.  Case management following.  Pending SNF.  Refused vitals, labs, and DVT prophylaxis     DVT prophylaxis:  Refusing prophylaxis    Anticipated discharge and disposition:   __________________________________________________________________________    LABS/MICRO/MEDS/DIAGNOSTICS       LABS  No results for input(s): NA, K, CHLORIDE, CO2, BUN, CREATININE, GLUCOSE, CALCIUM, ALKPHOS, AST, ALT, ALBUMIN in the last 72 hours.  No results for input(s): WBC, RBC, HCT, MCV, PLT in the last 72 hours.    Invalid input(s):     MICROBIOLOGY  Microbiology Results (last 7 days)       ** No results found for the last 168 hours. **            MEDICATIONS   benztropine  2 mg Oral Daily    haloperidoL  5 mg Oral Daily before evening meal    rivaroxaban  10 mg Oral Daily with dinner      INFUSIONS      DIAGNOSTIC TESTS  SURG FL Surgery Fluoro Usage   Final Result      CTA Lower Extremity Left   Final Result      1. Acute, nondisplaced fracture at the distal femoral metaphysis stabilized by an external fixator device.  This is at the level of the chronically ankylosed knee joint.  Fracture appears open with soft tissue gas extending from the anterior soft tissue defect in to the fracture.   2. No evidence of acute vascular injury.  Some portions of the vasculature are obscured related to beam hardening artifact from external fixator device.  The tip of the lower femoral fixation screw extends to just adjacent to the distal superficial femoral artery without appreciable vascular injury or contact.   3. Two vessel runoff to the ankle.  Posterior tibial artery is  diminutive and appears to be occluded proximally.  This is not definitively acute.   4. Trabeculated, distended bladder.   Minor discrepancy from preliminary report         Electronically signed by: Radha Valdivia   Date:    08/25/2022   Time:    10:13      SURG FL Surgery Fluoro Usage   Final Result      X-Ray Tibia Fibula 2 View Left   Final Result      No orthopedic hardware.         Electronically signed by: Radha Valdivia   Date:    08/24/2022   Time:    18:55      X-Ray Knee 1 or 2 View Left   Final Result      Acute on chronic appearing deformity at the left knee.  There appears to be have been bony fusion across the joint space with acute superimposed fracture versus pseudoarticulation.  There appears to be an anterior laceration.  Correlate for open fracture.         Electronically signed by: Radha Validvia   Date:    08/24/2022   Time:    18:55      X-Ray Femur 2 View Left   Final Result      Acute on chronic appearing deformity at the left knee.  There appears to be have been bony fusion across the joint space with acute superimposed fracture versus pseudoarticulation.  There appears to be an anterior laceration.  Correlate for open fracture.         Electronically signed by: Radha Valdivia   Date:    08/24/2022   Time:    18:55      RADIOLOGY REPORT   Final Result               All diagnosis and differential diagnosis have been reviewed; assessment and plan has been documented. I have personally reviewed the labs and test results that are presently available; I have reviewed the patients medication list. I have reviewed the consulting providers response and recommendations. I have reviewed or attempted to review medical records based upon their availability.  All of the patient's questions have been addressed and answered. Patient's is agreeable to the above stated plan. I will continue to monitor closely and make adjustments to medical management as needed.  This document was created using  M*Modal Fluency Direct.  Transcription errors may have been made.  Please contact me if any questions may rise regarding documentation to clarify verbiage.        Kyler New MD   09/21/2022   Internal Medicine

## 2022-09-21 NOTE — PT/OT/SLP PROGRESS
Physical Therapy         Treatment        Brandyn Ty   MRN: [id number]        PT Start Time: 0845     PT Stop Time: 0855    PT Total Time (min): 10 min       Billable Minutes:  Therapeutic Exercise 10  Total Minutes: 10    Treatment Type: Treatment  PT/PTA: PTA     PTA Visit Number: 1       General Precautions: Standard,    Orthopedic Precautions: Orthopedic Precautions : LLE non weight bearing   Braces: Braces: N/A    Spiritual, Cultural Beliefs, Catholic Practices, Values that Affect Care: no    Subjective:  Communicated with nurse prior to session.  Pt c/o not sleeping well last night, thus felt very tired  Pain/Comfort  Pain Rating 1: 0/10    Objective:  Patient found laying L sideline in bed, sleeping however arousable when name was called    Additional Treatment:  Supine therex, AROM BLE 2 sets of 10  Ankle pumps, SLR  RLE- heel slides, SAQ, hip abd/add    Activity Tolerance:  Patient limited by fatigue    Patient left left sidelying with call button in reach.    Assessment:  Brandyn Ty is a 62 y.o. male with a medical diagnosis of Displaced fracture of lateral condyle of left femur, initial encounter for open fracture type IIIA, IIIB, or IIIC. Pt r/f to get OOB at this time due to not sleeping well last night, but in agreement to perform therex in bed    Rehab potential is good.    Activity tolerance: Good and Fair    Discharge recommendations: Discharge Facility/Level of Care Needs: nursing facility, skilled, other (see comments) (vs swing bed)     Equipment recommendations: Equipment Needed After Discharge: walker, rolling     GOALS:   Multidisciplinary Problems       Physical Therapy Goals          Problem: Physical Therapy    Goal Priority Disciplines Outcome Goal Variances Interventions   Physical Therapy Goal     PT, PT/OT Ongoing, Progressing     Description: Goals to be met by: 2022     Patient will increase functional independence with mobility by performin. Supine  to sit with Modified [place]  2. Sit to supine with Modified [place]  3. Sit to stand transfer with Mod I   4. Gait  x 200 feet with Modified [place] using Rolling Walker.                          PLAN:    Patient to be seen daily  to address the above listed problems via therapeutic exercises  Plan of Care expires: 10/20/22  Plan of Care reviewed with: patient         9/21/2022

## 2022-09-22 PROCEDURE — 11000001 HC ACUTE MED/SURG PRIVATE ROOM

## 2022-09-22 PROCEDURE — 97116 GAIT TRAINING THERAPY: CPT | Mod: CQ

## 2022-09-22 PROCEDURE — 97168 OT RE-EVAL EST PLAN CARE: CPT

## 2022-09-22 PROCEDURE — 97110 THERAPEUTIC EXERCISES: CPT | Mod: CQ

## 2022-09-22 PROCEDURE — 25000003 PHARM REV CODE 250

## 2022-09-22 PROCEDURE — 25000003 PHARM REV CODE 250: Performed by: NURSE PRACTITIONER

## 2022-09-22 PROCEDURE — 25000003 PHARM REV CODE 250: Performed by: INTERNAL MEDICINE

## 2022-09-22 RX ADMIN — HYDROCODONE BITARTRATE AND ACETAMINOPHEN 1 TABLET: 5; 325 TABLET ORAL at 05:09

## 2022-09-22 RX ADMIN — RIVAROXABAN 10 MG: 10 TABLET, FILM COATED ORAL at 05:09

## 2022-09-22 RX ADMIN — HYDROCODONE BITARTRATE AND ACETAMINOPHEN 1 TABLET: 5; 325 TABLET ORAL at 09:09

## 2022-09-22 RX ADMIN — HYDROCODONE BITARTRATE AND ACETAMINOPHEN 1 TABLET: 5; 325 TABLET ORAL at 08:09

## 2022-09-22 RX ADMIN — HALOPERIDOL 5 MG: 5 TABLET ORAL at 05:09

## 2022-09-22 RX ADMIN — HYDROCODONE BITARTRATE AND ACETAMINOPHEN 1 TABLET: 5; 325 TABLET ORAL at 01:09

## 2022-09-22 RX ADMIN — BENZTROPINE MESYLATE 2 MG: 1 TABLET ORAL at 08:09

## 2022-09-22 NOTE — PROGRESS NOTES
[facility] MEDICINE   PROGRESS NOTE        CHIEF COMPLAINT   Hospital follow up    HOSPITAL COURSE   63 yo AAM with hx Schizophrenia and HTN who presents to the ED after a ground level fall landing on his left knee. He is an extremely poor historian. He sustained a displaced open femur fracture grade 3A vs B.  that warranted immediate surgical intervention. Dr. Chatterjee was consulted and pt underwent I&D and external fixation. The Hospitalist Service was consulted for medical management.    Was also found to have hyponatremia that is improving.  Plan for open reduction and internal fixation revision knee fusion today.  Patient ripped his IV lines last night and also refusing lab work.August 28 it patient had left knee arthrodesis revision and left knee external fixator adjustment  Orthopedic recs are to continue ex fix for 8 weeks and then removal, then begin progressive weight-bearing at that time.    S/p IM Haldol dose 100mg q monthly on 9/6  Unfortunately patient was denied by insurance for rehab or skilled nursing facility.  Change IV antibiotics to p.o. Keflex 500 mg q.6 hours hourly.  To complete a 10 day course for pin site possible infection.    Today  No new issues at this time.  He was asking about getting some milk for breakfast.        OBJECTIVE/PHYSICAL EXAM     VITAL SIGNS (MOST RECENT):  Temp: 97.7 °F (36.5 °C) (09/22/22 0739)  Pulse: 92 (09/22/22 0739)  Resp: 16 (09/22/22 0828)  BP: (!) 148/91 (09/22/22 0739)  SpO2: 98 % (09/22/22 0739) VITAL SIGNS (24 HOUR RANGE):  Temp:  [97.7 °F (36.5 °C)-98.8 °F (37.1 °C)] 97.7 °F (36.5 °C)  Pulse:  [88-92] 92  Resp:  [16-19] 16  SpO2:  [96 %-98 %] 98 %  BP: (128-148)/(64-91) 148/91   GENERAL: In no acute distress, afebrile  HEENT:  CHEST: Clear to auscultation bilaterally  HEART: S1, S2, no appreciable murmur  ABDOMEN: Soft, nontender, BS +  MSK: Warm, no lower extremity edema, no clubbing or cyanosis, left lower extremity in  a external fixator  NEUROLOGIC: Alert and oriented x4, moving all extremities with good strength   INTEGUMENTARY:  PSYCHIATRY:        ASSESSMENT/PLAN   Possible external fixator site insertion cellulitis   Acute watery diarrhea   Open distal left femur fracture status post I&D and ORIF August 24     History of:  Schizophrenia, essential hypertension, open distal left femur fracture status post I&D and ORIF August 24        Orthopedic surgery signed off.  Follow-up in 2 to 3 weeks with Dr. Chatterjee.  Ex fix x8 weeks.  Completed antibiotics.  Case management following.  Pending SNF.  Refused vitals, labs, and DVT prophylaxis     DVT prophylaxis:  Refusing prophylaxis    Anticipated discharge and disposition:   __________________________________________________________________________    LABS/MICRO/MEDS/DIAGNOSTICS       LABS  No results for input(s): NA, K, CHLORIDE, CO2, BUN, CREATININE, GLUCOSE, CALCIUM, ALKPHOS, AST, ALT, ALBUMIN in the last 72 hours.  No results for input(s): WBC, RBC, HCT, MCV, PLT in the last 72 hours.    Invalid input(s):     MICROBIOLOGY  Microbiology Results (last 7 days)       ** No results found for the last 168 hours. **            MEDICATIONS   benztropine  2 mg Oral Daily    haloperidoL  5 mg Oral Daily before evening meal    rivaroxaban  10 mg Oral Daily with dinner      INFUSIONS      DIAGNOSTIC TESTS  SURG FL Surgery Fluoro Usage   Final Result      CTA Lower Extremity Left   Final Result      1. Acute, nondisplaced fracture at the distal femoral metaphysis stabilized by an external fixator device.  This is at the level of the chronically ankylosed knee joint.  Fracture appears open with soft tissue gas extending from the anterior soft tissue defect in to the fracture.   2. No evidence of acute vascular injury.  Some portions of the vasculature are obscured related to beam hardening artifact from external fixator device.  The tip of the lower femoral fixation screw extends to just  adjacent to the distal superficial femoral artery without appreciable vascular injury or contact.   3. Two vessel runoff to the ankle.  Posterior tibial artery is diminutive and appears to be occluded proximally.  This is not definitively acute.   4. Trabeculated, distended bladder.   Minor discrepancy from preliminary report         Electronically signed by: Radha Valdivia   Date:    08/25/2022   Time:    10:13      SURG FL Surgery Fluoro Usage   Final Result      X-Ray Tibia Fibula 2 View Left   Final Result      No orthopedic hardware.         Electronically signed by: Radha Valdivia   Date:    08/24/2022   Time:    18:55      X-Ray Knee 1 or 2 View Left   Final Result      Acute on chronic appearing deformity at the left knee.  There appears to be have been bony fusion across the joint space with acute superimposed fracture versus pseudoarticulation.  There appears to be an anterior laceration.  Correlate for open fracture.         Electronically signed by: Radha Valdivia   Date:    08/24/2022   Time:    18:55      X-Ray Femur 2 View Left   Final Result      Acute on chronic appearing deformity at the left knee.  There appears to be have been bony fusion across the joint space with acute superimposed fracture versus pseudoarticulation.  There appears to be an anterior laceration.  Correlate for open fracture.         Electronically signed by: Radha Valdivia   Date:    08/24/2022   Time:    18:55      RADIOLOGY REPORT   Final Result               All diagnosis and differential diagnosis have been reviewed; assessment and plan has been documented. I have personally reviewed the labs and test results that are presently available; I have reviewed the patients medication list. I have reviewed the consulting providers response and recommendations. I have reviewed or attempted to review medical records based upon their availability.  All of the patient's questions have been addressed and answered. Patient's is  agreeable to the above stated plan. I will continue to monitor closely and make adjustments to medical management as needed.  This document was created using M*Modal Fluency Direct.  Transcription errors may have been made.  Please contact me if any questions may rise regarding documentation to clarify verbiage.        Kyler New MD   09/22/2022   Internal Medicine

## 2022-09-22 NOTE — PT/OT/SLP RE-EVAL
Occupational Therapy   Re-evaluation    Name: Brandyn Ty  MRN: [id number]  Admitting Diagnosis:  Displaced fracture of lateral condyle of left femur, initial encounter for open fracture type IIIA, IIIB, or IIIC  Recent Surgery: Procedure(s) (LRB):  arthrodesis  (Left) 25 Days Post-Op    Recommendations:     Discharge Recommendations: nursing facility, skilled  Discharge Equipment Recommendations:  walker, rolling, wheelchair  Barriers to discharge:  Decreased caregiver support, Other (Comment) (Severity of deficits)    Assessment:     Brandyn Ty is a 62 y.o. male with a medical diagnosis of Displaced fracture of lateral condyle of left femur, initial encounter for open fracture type IIIA, IIIB, or IIIC. Performance deficits affecting function are weakness, impaired functional mobility, decreased safety awareness, impaired endurance, gait instability, pain, impaired balance, impaired self care skills, orthopedic precautions.  Pt is limited by decreased balance and poor safety awareness and is at high risk for falls. Pt would benefit from SNF placement at d/c in order to improve safety and IND c ADLs.     Rehab Prognosis:  Good; patient would benefit from acute skilled OT services to address these deficits and reach maximum level of function.       Plan:     Patient to be seen 2 x/week, 3 x/week to address the above listed problems via self-care/home management, therapeutic activities, therapeutic exercises  Plan of Care Expires: 10/06/22  Plan of Care Reviewed with: patient    Subjective     Chief Complaint: Pain in LLE  Patient/Family stated goals: To return to PLOF   Communicated with: RN prior to session.  Pain/Comfort:  Pain Rating 1: 5/10  Location - Side 1: Left  Location 1: leg  Pain Addressed 1: Reposition    Objective:     Communicated with: RN prior to session.  Patient found up in chair with: external fixator upon OT entry to room.    General Precautions: Standard, fall   Orthopedic  Precautions:LLE non weight bearing   Braces: N/A  Respiratory Status: Room air    Occupational Performance:    Functional Mobility/Transfers:  Patient completed Sit <> Stand Transfer with stand by assistance  with  rolling walker   Patient completed Toilet Transfer hop to technique with stand by assistance with  rolling walker  Functional Mobility: Pt ambulated to bathroom using RW c SBA.    Activities of Daily Living:  Grooming: stand by assistance Pt brushed teeth while standing at sink c SBA   Lower Body Dressing: total assistance Pt declined to attempt despite encouragement   Toileting: stand by assistance      Cognitive/Visual Perceptual:  Cognitive/Psychosocial Skills:     -       Mood/Affect/Coping skills/emotional control: Cooperative    Physical Exam:  Upper Extremity Strength:    -       Right Upper Extremity: WNL  -       Left Upper Extremity: WNL      Treatment & Education:    Patient left up in chair with call button in reach    GOALS:   Multidisciplinary Problems       Occupational Therapy Goals          Problem: Occupational Therapy    Goal Priority Disciplines Outcome Interventions   Occupational Therapy Goal     OT, PT/OT Ongoing, Progressing    Description: Goals to be met by: 9/23    Patient will increase functional independence with ADLs by performing:    LE Dressing with Modified [place].  Grooming while standing at sink with Modified [place].  Toileting from toilet with Modified [place] for hygiene and clothing management.   Toilet transfer to toilet with Modified [place].                         History:     Past Medical History:   Diagnosis Date    Anxiety     Hypertension          Past Surgical History:   Procedure Laterality Date    APPLICATION OF LARGE EXTERNAL FIXATION DEVICE TO TIBIA Left 8/24/2022    Procedure: APPLICATION, EXTERNAL FIXATION DEVICE, LARGE, TIBIA;  Surgeon: Shawn Chatterjee DO;  Location: [facility];  Service: Orthopedics;  Laterality: Left;       Time  Tracking:     OT Date of Treatment: 09/22/22  OT Start Time: 1026  OT Stop Time: 1036  OT Total Time (min): 10 min    Billable Minutes:Re-eval 1 unit    9/22/2022

## 2022-09-22 NOTE — PT/OT/SLP PROGRESS
Physical Therapy         Treatment        Brandyn Ty   MRN: [id number]        PT Start Time: 0831     PT Stop Time: 0854    PT Total Time (min): 23 min       Billable Minutes:  Gait [id number] and Therapeutic Exercise 8  Total Minutes: 23    Treatment Type: Treatment  PT/PTA: PTA     PTA Visit Number: 2       General Precautions: Standard,    Orthopedic Precautions: Orthopedic Precautions : LLE non weight bearing   Braces: Braces: N/A    Spiritual, Cultural Beliefs, Temple Practices, Values that Affect Care: no    Subjective:  Communicated with nurse prior to session.    Pain/Comfort  Pain Rating 1: 0/10    Objective:  Patient found sitting UIC upon arrival, with  Vitals taken before mobility, BP- 155/88, HR 85  Functional Mobility    Balance:   Static Stand: GOOD: Takes MODERATE challenges from all directions  Dynamic stand: 0: N/A    Transfer Training:  Sit to stand:Stand-by Assistance with Rolling Walker sit<>stand from bedside chair performed 5x, with no issues noted, pt is able to maintain NWB LLE    Gait Training:  Patient gait trained NWB: left lower extremity 90  feet on level tile with Rolling Walker with Contact Guard Assistance.  Pt with demonstarting a  swing-to gait with decreased pj and decreased step length.Impairments contributing to gait deviations include decreased ROM, decreased strength, and with chair in tow, noted decreased endurance    Additional Treatment:  Semi supine therex, 10x AROM BLE  Ankle pumps, hip abd/add  RLE- heel slides, SAQ, SLR  Pt unable to perform SLR on LLE due to increased pain    Activity Tolerance:  Patient tolerated treatment well    Patient left up in chair with call button in reach.    Assessment:  Brandyn Ty is a 62 y.o. male with a medical diagnosis of Displaced fracture of lateral condyle of left femur, initial encounter for open fracture type IIIA, IIIB, or IIIC. Pt was able to hop further today with pt able tolerate endurance tx as  well    Rehab potential is good.    Activity tolerance: Excellent    Discharge recommendations: Discharge Facility/Level of Care Needs: nursing facility, skilled     Equipment recommendations: Equipment Needed After Discharge: walker, rolling     GOALS:   Multidisciplinary Problems       Physical Therapy Goals          Problem: Physical Therapy    Goal Priority Disciplines Outcome Goal Variances Interventions   Physical Therapy Goal     PT, PT/OT Ongoing, Progressing     Description: Goals to be met by: 2022     Patient will increase functional independence with mobility by performin. Supine to sit with Modified [place]  2. Sit to supine with Modified [place]  3. Sit to stand transfer with Mod I   4. Gait  x 200 feet with Modified [place] using Rolling Walker.                          PLAN:    Patient to be seen daily  to address the above listed problems via gait training, therapeutic activities, therapeutic exercises  Plan of Care expires: 10/20/22  Plan of Care reviewed with: patient         2022

## 2022-09-22 NOTE — PLAN OF CARE
Spoke with Nelida Cardona who is willing to reassess pt's referral for SNF placement only, as pt does not want longterm placement. Awaiting response.

## 2022-09-23 PROCEDURE — 97110 THERAPEUTIC EXERCISES: CPT | Mod: CQ

## 2022-09-23 PROCEDURE — 97116 GAIT TRAINING THERAPY: CPT | Mod: CQ

## 2022-09-23 PROCEDURE — 25000003 PHARM REV CODE 250: Performed by: INTERNAL MEDICINE

## 2022-09-23 PROCEDURE — 25000003 PHARM REV CODE 250: Performed by: NURSE PRACTITIONER

## 2022-09-23 PROCEDURE — 11000001 HC ACUTE MED/SURG PRIVATE ROOM

## 2022-09-23 PROCEDURE — 25000003 PHARM REV CODE 250

## 2022-09-23 RX ADMIN — RIVAROXABAN 10 MG: 10 TABLET, FILM COATED ORAL at 06:09

## 2022-09-23 RX ADMIN — BENZTROPINE MESYLATE 2 MG: 1 TABLET ORAL at 09:09

## 2022-09-23 RX ADMIN — HYDROCODONE BITARTRATE AND ACETAMINOPHEN 1 TABLET: 5; 325 TABLET ORAL at 06:09

## 2022-09-23 RX ADMIN — HYDROCODONE BITARTRATE AND ACETAMINOPHEN 1 TABLET: 5; 325 TABLET ORAL at 01:09

## 2022-09-23 RX ADMIN — HYDROCODONE BITARTRATE AND ACETAMINOPHEN 1 TABLET: 5; 325 TABLET ORAL at 09:09

## 2022-09-23 RX ADMIN — HYDROCODONE BITARTRATE AND ACETAMINOPHEN 1 TABLET: 5; 325 TABLET ORAL at 05:09

## 2022-09-23 RX ADMIN — HALOPERIDOL 5 MG: 5 TABLET ORAL at 06:09

## 2022-09-23 NOTE — PT/OT/SLP PROGRESS
Physical Therapy         Treatment        Brandyn Ty   MRN: [id number]        PT Start Time: 0851     PT Stop Time: 0914    PT Total Time (min): 23 min       Billable Minutes:  Gait [id number] and Therapeutic Exercise 8  Total Minutes: 23    Treatment Type: Treatment  PT/PTA: PTA     PTA Visit Number: 3       General Precautions: Standard,    Orthopedic Precautions: Orthopedic Precautions : LLE non weight bearing   Braces: Braces: N/A    Spiritual, Cultural Beliefs, Hinduism Practices, Values that Affect Care: no    Subjective:  Communicated with nurse prior to session.    Pain/Comfort  Pain Rating 1: 0/10    Objective:  Patient found laying in bed upon arrival    Functional Mobility:  Bed Mobility:   Supine to sit: Standby Assistance- HOB elevated   Sit to supine: Activity did not occur   Rolling: Activity did not occur   Scooting: Standby Assistance    Balance:   Static Sit: GOOD: Takes MODERATE challenges from all directions  Dynamic Sit:  0: N/A  Static Stand: FAIR+: Takes MINIMAL challenges from all directions  Dynamic stand: 0: N/A    Transfer Training:  Sit to stand:Stand-by Assistance with Rolling Walker sit to stand from EOB    Gait Training:  Patient gait trained NWB: left lower extremity 100  feet on level tile with Rolling Walker with Contact Guard Assistance and Minimal Assistance.  Pt with demonstarting a  swing-to gait with decreased pj.Impairments contributing to gait deviations include impaired balance, with 1 LOB noted when turning toward R side, min a given to help correct    Additional Treatment:  Semi supine therex, AROM 10x  Ankle pumps, SLR, hip abd/add  RLE-heel slides, SAQ    Activity Tolerance:  Patient tolerated treatment well    Patient left up in chair with call button in reach.    Assessment:  Brandyn Ty is a 62 y.o. male with a medical diagnosis of Displaced fracture of lateral condyle of left femur, initial encounter for open fracture type IIIA, IIIB, or  IIIC. Pt was able to ambulate further today, and tolerate all therex with less pain noted then yesterday    Rehab potential is good.    Activity tolerance: Excellent    Discharge recommendations: Discharge Facility/Level of Care Needs: nursing facility, skilled, other (see comments) (vs swing bed)     Equipment recommendations: Equipment Needed After Discharge: walker, rolling     GOALS:   Multidisciplinary Problems       Physical Therapy Goals          Problem: Physical Therapy    Goal Priority Disciplines Outcome Goal Variances Interventions   Physical Therapy Goal     PT, PT/OT Ongoing, Progressing     Description: Goals to be met by: 2022     Patient will increase functional independence with mobility by performin. Supine to sit with Modified [place]  2. Sit to supine with Modified [place]  3. Sit to stand transfer with Mod I   4. Gait  x 200 feet with Modified [place] using Rolling Walker.                          PLAN:    Patient to be seen daily  to address the above listed problems via gait training, therapeutic activities, therapeutic exercises  Plan of Care expires: 10/20/22  Plan of Care reviewed with: patient         2022

## 2022-09-23 NOTE — PLAN OF CARE
Spoke with Nelida at [facility]. They are reassessing pt and submitting for insurance authorization. CM made aware.

## 2022-09-23 NOTE — PROGRESS NOTES
[facility]   PROGRESS NOTE        CHIEF COMPLAINT   Hospital follow up    HOSPITAL COURSE   61 yo AAM with hx Schizophrenia and HTN who presents to the ED after a ground level fall landing on his left knee. He is an extremely poor historian. He sustained a displaced open femur fracture grade 3A vs B.  that warranted immediate surgical intervention. Dr. Chatterjee was consulted and pt underwent I&D and external fixation. The Hospitalist Service was consulted for medical management.    Was also found to have hyponatremia that is improving.  Plan for open reduction and internal fixation revision knee fusion today.  Patient ripped his IV lines last night and also refusing lab work.August 28 it patient had left knee arthrodesis revision and left knee external fixator adjustment  Orthopedic recs are to continue ex fix for 8 weeks and then removal, then begin progressive weight-bearing at that time.    S/p IM Haldol dose 100mg q monthly on 9/6  Unfortunately patient was denied by insurance for rehab or skilled nursing facility.  Change IV antibiotics to p.o. Keflex 500 mg q.6 hours hourly.  To complete a 10 day course for pin site possible infection.    Today  No new changes at this time.  Eating his breakfast this morning.        OBJECTIVE/PHYSICAL EXAM     VITAL SIGNS (MOST RECENT):  Temp: 97.9 °F (36.6 °C) (09/23/22 0758)  Pulse: 86 (09/23/22 0758)  Resp: 16 (09/23/22 0933)  BP: 109/80 (09/23/22 0758)  SpO2: 95 % (09/23/22 0758) VITAL SIGNS (24 HOUR RANGE):  Temp:  [97.5 °F (36.4 °C)-97.9 °F (36.6 °C)] 97.9 °F (36.6 °C)  Pulse:  [66-86] 86  Resp:  [16-20] 16  SpO2:  [95 %-100 %] 95 %  BP: (107-147)/(70-87) 109/80   GENERAL: In no acute distress, afebrile  HEENT:  CHEST: Clear to auscultation bilaterally  HEART: S1, S2, no appreciable murmur  ABDOMEN: Soft, nontender, BS +  MSK: Warm, no lower extremity edema, no clubbing or cyanosis, left lower extremity in a external  fixator  NEUROLOGIC: Alert and oriented x4, moving all extremities with good strength   INTEGUMENTARY:  PSYCHIATRY:        ASSESSMENT/PLAN   Possible external fixator site insertion cellulitis   Acute watery diarrhea   Open distal left femur fracture status post I&D and ORIF August 24     History of:  Schizophrenia, essential hypertension, open distal left femur fracture status post I&D and ORIF August 24        Orthopedic surgery signed off.  Follow-up in 2 to 3 weeks with Dr. Chatterjee.  Ex fix x8 weeks.  Completed antibiotics.  Case management following.  Pending SNF.  Refused vitals, labs, and DVT prophylaxis     DVT prophylaxis:  Refusing prophylaxis    Anticipated discharge and disposition:   __________________________________________________________________________    LABS/MICRO/MEDS/DIAGNOSTICS       LABS  No results for input(s): NA, K, CHLORIDE, CO2, BUN, CREATININE, GLUCOSE, CALCIUM, ALKPHOS, AST, ALT, ALBUMIN in the last 72 hours.  No results for input(s): WBC, RBC, HCT, MCV, PLT in the last 72 hours.    Invalid input(s):     MICROBIOLOGY  Microbiology Results (last 7 days)       ** No results found for the last 168 hours. **            MEDICATIONS   benztropine  2 mg Oral Daily    haloperidoL  5 mg Oral Daily before evening meal    rivaroxaban  10 mg Oral Daily with dinner      INFUSIONS      DIAGNOSTIC TESTS  SURG FL Surgery Fluoro Usage   Final Result      CTA Lower Extremity Left   Final Result      1. Acute, nondisplaced fracture at the distal femoral metaphysis stabilized by an external fixator device.  This is at the level of the chronically ankylosed knee joint.  Fracture appears open with soft tissue gas extending from the anterior soft tissue defect in to the fracture.   2. No evidence of acute vascular injury.  Some portions of the vasculature are obscured related to beam hardening artifact from external fixator device.  The tip of the lower femoral fixation screw extends to just adjacent to the  distal superficial femoral artery without appreciable vascular injury or contact.   3. Two vessel runoff to the ankle.  Posterior tibial artery is diminutive and appears to be occluded proximally.  This is not definitively acute.   4. Trabeculated, distended bladder.   Minor discrepancy from preliminary report         Electronically signed by: Radha Valdivia   Date:    08/25/2022   Time:    10:13      SURG FL Surgery Fluoro Usage   Final Result      X-Ray Tibia Fibula 2 View Left   Final Result      No orthopedic hardware.         Electronically signed by: Radha Valdiiva   Date:    08/24/2022   Time:    18:55      X-Ray Knee 1 or 2 View Left   Final Result      Acute on chronic appearing deformity at the left knee.  There appears to be have been bony fusion across the joint space with acute superimposed fracture versus pseudoarticulation.  There appears to be an anterior laceration.  Correlate for open fracture.         Electronically signed by: Radha Valdivia   Date:    08/24/2022   Time:    18:55      X-Ray Femur 2 View Left   Final Result      Acute on chronic appearing deformity at the left knee.  There appears to be have been bony fusion across the joint space with acute superimposed fracture versus pseudoarticulation.  There appears to be an anterior laceration.  Correlate for open fracture.         Electronically signed by: Radha Valdivia   Date:    08/24/2022   Time:    18:55      RADIOLOGY REPORT   Final Result               All diagnosis and differential diagnosis have been reviewed; assessment and plan has been documented. I have personally reviewed the labs and test results that are presently available; I have reviewed the patients medication list. I have reviewed the consulting providers response and recommendations. I have reviewed or attempted to review medical records based upon their availability.  All of the patient's questions have been addressed and answered. Patient's is agreeable to the  above stated plan. I will continue to monitor closely and make adjustments to medical management as needed.  This document was created using M*Modal Fluency Direct.  Transcription errors may have been made.  Please contact me if any questions may rise regarding documentation to clarify verbiage.        Kyler New MD   09/23/2022   Internal Medicine

## 2022-09-24 PROCEDURE — 25000003 PHARM REV CODE 250

## 2022-09-24 PROCEDURE — 25000003 PHARM REV CODE 250: Performed by: INTERNAL MEDICINE

## 2022-09-24 PROCEDURE — 11000001 HC ACUTE MED/SURG PRIVATE ROOM

## 2022-09-24 PROCEDURE — 25000003 PHARM REV CODE 250: Performed by: NURSE PRACTITIONER

## 2022-09-24 RX ADMIN — HYDROCODONE BITARTRATE AND ACETAMINOPHEN 1 TABLET: 5; 325 TABLET ORAL at 04:09

## 2022-09-24 RX ADMIN — RIVAROXABAN 10 MG: 10 TABLET, FILM COATED ORAL at 04:09

## 2022-09-24 RX ADMIN — HYDROCODONE BITARTRATE AND ACETAMINOPHEN 1 TABLET: 5; 325 TABLET ORAL at 11:09

## 2022-09-24 RX ADMIN — HALOPERIDOL 5 MG: 5 TABLET ORAL at 04:09

## 2022-09-24 RX ADMIN — BENZTROPINE MESYLATE 2 MG: 1 TABLET ORAL at 08:09

## 2022-09-24 NOTE — PT/OT/SLP PROGRESS
Attempted to see pt at 1017 this am and pt refused despite max encouragment  secondary to being tired.

## 2022-09-24 NOTE — PROGRESS NOTES
Hospital Medicine  Progress Note    Patient Name: Brandyn Ty  MRN: [id number]  Status: IP- Inpatient   Admission Date: 8/24/2022  Length of Stay: 31      CC: hospital follow-up for open femur fracture       SUBJECTIVE   HPI and hospital course reviewed.    Today: Patient seen and examined at bedside, and chart reviewed.  No new issues or events.      MEDICATIONS   Scheduled   benztropine  2 mg Oral Daily    haloperidoL  5 mg Oral Daily before evening meal    rivaroxaban  10 mg Oral Daily with dinner     Continuous Infusions  None      PHYSICAL EXAM   VITALS: T 97.9 °F (36.6 °C)   /65   P 74   RR 15   O2 99 %    GENERAL: Awake and in NAD  LUNGS: CTA anteriorly  CVS: Normal rate  GI/: Soft, NT, bowel sounds positive.  EXTREMITIES: No peripheral edema  NEURO: Awake and alert  PSYCH: Calm, partially cooperative      LABS   CBC  No results for input(s): WBC, HGB, HCT, PLT in the last 72 hours.   CHEM  No results for input(s): NA, K, MG, CO2, BUN, CREATININE, CALCIUM, BILITOT, AST, ALT, ALKPHOS, ALBUMIN, PHOS in the last 72 hours.       ASSESSMENT   Open distal left femur fracture s/p debridement & ORIF 8/24; Revision & Ex-fix adjustment 8/28  h/o Schizophrenia,   Essential hypertension    PLAN   CM has been working on placement  Continue current management including therapy services in the interim        Prophylaxis: Xarelto 10        Timur Oconnor MD  [facility] Medicine  09/24/2022

## 2022-09-25 PROCEDURE — 11000001 HC ACUTE MED/SURG PRIVATE ROOM

## 2022-09-25 PROCEDURE — 25000003 PHARM REV CODE 250: Performed by: INTERNAL MEDICINE

## 2022-09-25 PROCEDURE — 25000003 PHARM REV CODE 250

## 2022-09-25 PROCEDURE — 25000003 PHARM REV CODE 250: Performed by: NURSE PRACTITIONER

## 2022-09-25 RX ADMIN — BENZTROPINE MESYLATE 2 MG: 1 TABLET ORAL at 08:09

## 2022-09-25 RX ADMIN — HYDROCODONE BITARTRATE AND ACETAMINOPHEN 1 TABLET: 5; 325 TABLET ORAL at 08:09

## 2022-09-25 RX ADMIN — RIVAROXABAN 10 MG: 10 TABLET, FILM COATED ORAL at 04:09

## 2022-09-25 RX ADMIN — HALOPERIDOL 5 MG: 5 TABLET ORAL at 04:09

## 2022-09-26 PROCEDURE — 25000003 PHARM REV CODE 250: Performed by: NURSE PRACTITIONER

## 2022-09-26 PROCEDURE — 25000003 PHARM REV CODE 250: Performed by: INTERNAL MEDICINE

## 2022-09-26 PROCEDURE — 25000003 PHARM REV CODE 250

## 2022-09-26 PROCEDURE — 97535 SELF CARE MNGMENT TRAINING: CPT

## 2022-09-26 PROCEDURE — 97116 GAIT TRAINING THERAPY: CPT | Mod: CQ

## 2022-09-26 PROCEDURE — 11000001 HC ACUTE MED/SURG PRIVATE ROOM

## 2022-09-26 RX ADMIN — BENZTROPINE MESYLATE 2 MG: 1 TABLET ORAL at 09:09

## 2022-09-26 RX ADMIN — HALOPERIDOL 5 MG: 5 TABLET ORAL at 05:09

## 2022-09-26 RX ADMIN — HYDROCODONE BITARTRATE AND ACETAMINOPHEN 1 TABLET: 5; 325 TABLET ORAL at 05:09

## 2022-09-26 RX ADMIN — HYDROCODONE BITARTRATE AND ACETAMINOPHEN 1 TABLET: 5; 325 TABLET ORAL at 01:09

## 2022-09-26 NOTE — PT/OT/SLP PROGRESS
Occupational Therapy  Treatment    Brandyn Ty   MRN: [id number]   Admitting Diagnosis: Displaced fracture of lateral condyle of left femur, initial encounter for open fracture type IIIA, IIIB, or IIIC    OT Date of Treatment: 09/26/22   OT Start Time: 1040  OT Stop Time: 1103  OT Total Time (min): 23 min     Billable Minutes:  Self Care/Home Management 2 units  Total Minutes: 23 minutes      OT/IVANNA: OT     IVANNA Visit Number: 1    General Precautions: Standard, fall  Orthopedic Precautions: LLE non weight bearing  Braces: N/A    Spiritual, Cultural Beliefs, Taoism Practices, Values that Affect Care: no    Subjective:  Communicated with RN prior to session.    Pain/Comfort  Pain Rating 1: 0/10    Objective:  Patient found with: external fixator    Transfer Training:   Sit to stand:Stand-by Assistance with Rolling Walker    Toilet Transfer:  Pt sit<>stand with Stand-by Assistance with Rolling Walker Pt ambulated to bathroom using RW c SBA     Grooming:  Patient performed oral hygeine with Stand-by Assistance at standing at sink.    Toilet Training:  Pt performed toileting with Stand-by Assistance with Grab  bar at [employer].    Additional Treatment:    Patient left up in chair with call button in reach    ASSESSMENT:    Rehab potential is good    Activity tolerance: Good    Discharge recommendations: nursing facility, skilled     Equipment recommendations: walker, rolling     GOALS:   Multidisciplinary Problems       Occupational Therapy Goals          Problem: Occupational Therapy    Goal Priority Disciplines Outcome Interventions   Occupational Therapy Goal     OT, PT/OT Ongoing, Progressing    Description: Goals to be met by: 9/23    Patient will increase functional independence with ADLs by performing:    LE Dressing with Modified [place].  Grooming while standing at sink with Modified [place].  Toileting from toilet with Modified [place] for hygiene and clothing management.   Toilet  transfer to toilet with Modified [place].                         Plan:  Patient to be seen 2 x/week, 3 x/week to address the above listed problems via self-care/home management, therapeutic activities, therapeutic exercises  Plan of Care expires: 10/06/22  Plan of Care reviewed with: patient         09/26/2022

## 2022-09-26 NOTE — PT/OT/SLP PROGRESS
Physical Therapy         Treatment        Brandyn Ty   MRN: [id number]        PT Start Time: 0917     PT Stop Time: 0947    PT Total Time (min): 30 min       Billable Minutes:  Gait [id number]  Total Minutes: 30    Treatment Type: Treatment  PT/PTA: PTA     PTA Visit Number: 4       General Precautions: Standard,    Orthopedic Precautions: Orthopedic Precautions : LLE non weight bearing   Braces:      Spiritual, Cultural Beliefs, Rastafari Practices, Values that Affect Care: no    Subjective:  Communicated with nurse prior to session.    Pain/Comfort  Pain Rating 1: 0/10    Objective:  Patient found sitting UIC upon arrival, with  pt was able to donne on personal shoes for gait tx    Functional Mobility:  Balance:   Static Stand: GOOD: Takes MODERATE challenges from all directions  Dynamic stand: 0: N/A    Transfer Training:  Sit to stand:Stand-by Assistance with Rolling Walker sit to stand from bedside chair with no issues  Toilet Transfer:  Pt Step Transfer with Contact Guard Assistance with Rolling Walker pt t/f from toilet to bedside chair with a hop to gait, with no major LOB noted    Gait Training:  Patient gait trained NWB: left lower extremity 140ft then 100  feet on level tile with Rolling Walker with Stand-by Assistance.  Pt with demonstarting a  swing-to gait with decreased pj and decreased step length.Impairments contributing to gait deviations include decreased ROM and with 2 sit rest breaks noted, due to noted poor endurance    Activity Tolerance:  Patient tolerated treatment well    Patient left up in chair with call button in reach.    Assessment:  Brandyn Ty is a 62 y.o. male with a medical diagnosis of Displaced fracture of lateral condyle of left femur, initial encounter for open fracture type IIIA, IIIB, or IIIC. Pt was able to ambulate further today, pt very motivated today to keep hopping further and get back to PLOF    Rehab potential is good.    Activity tolerance:  Excellent    Discharge recommendations: Discharge Facility/Level of Care Needs: rehabilitation facility, nursing facility, skilled     Equipment recommendations: Equipment Needed After Discharge: walker, rolling     GOALS:   Multidisciplinary Problems       Physical Therapy Goals          Problem: Physical Therapy    Goal Priority Disciplines Outcome Goal Variances Interventions   Physical Therapy Goal     PT, PT/OT Ongoing, Progressing     Description: Goals to be met by: 2022     Patient will increase functional independence with mobility by performin. Supine to sit with Modified [place]  2. Sit to supine with Modified [place]  3. Sit to stand transfer with Mod I   4. Gait  x 200 feet with Modified [place] using Rolling Walker.                          PLAN:    Patient to be seen daily  to address the above listed problems via gait training, therapeutic activities  Plan of Care expires: 10/20/22  Plan of Care reviewed with: patient         2022

## 2022-09-26 NOTE — PROGRESS NOTES
Hospital Medicine  Progress Note    Patient Name: Brandyn Ty  MRN: [id number]  Status: IP- Inpatient   Admission Date: 8/24/2022  Length of Stay: 33      CC: hospital follow-up for open femur fracture       SUBJECTIVE   HPI and hospital course reviewed.    Today: Patient seen and examined at bedside, and chart reviewed.  No complaints today.      MEDICATIONS   Scheduled   benztropine  2 mg Oral Daily    haloperidoL  5 mg Oral Daily before evening meal    rivaroxaban  10 mg Oral Daily with dinner     Continuous Infusions  None      PHYSICAL EXAM   VITALS: T 97.6 °F (36.4 °C)   /75   P 100   RR 20   O2 98 %    GENERAL: Awake and in NAD  LUNGS: CTA anteriorly  CVS: Normal rate  GI/: Soft, NT, bowel sounds positive.  EXTREMITIES: external fixator inplace to LLE  NEURO: Awake and alert  PSYCH: Calm, partially cooperative      LABS   CBC  No results for input(s): WBC, HGB, HCT, PLT in the last 72 hours.   CHEM  No results for input(s): NA, K, MG, CO2, BUN, CREATININE, CALCIUM, BILITOT, AST, ALT, ALKPHOS, ALBUMIN, PHOS in the last 72 hours.       ASSESSMENT   Open distal left femur fracture s/p debridement & ORIF 8/24; Revision & Ex-fix adjustment 8/28  h/o Schizophrenia,   Essential hypertension    PLAN   CM working on SNFplacement  Continue current management including therapy services in the interim      Prophylaxis: Xarelto 10        Timur Oconnor MD  [facility] Medicine  09/26/2022

## 2022-09-27 LAB — SARS-COV-2 RDRP RESP QL NAA+PROBE: NEGATIVE

## 2022-09-27 PROCEDURE — 87635 SARS-COV-2 COVID-19 AMP PRB: CPT | Performed by: INTERNAL MEDICINE

## 2022-09-27 PROCEDURE — 97530 THERAPEUTIC ACTIVITIES: CPT | Mod: CQ

## 2022-09-27 PROCEDURE — 25000003 PHARM REV CODE 250

## 2022-09-27 PROCEDURE — 25000003 PHARM REV CODE 250: Performed by: INTERNAL MEDICINE

## 2022-09-27 PROCEDURE — 25000003 PHARM REV CODE 250: Performed by: NURSE PRACTITIONER

## 2022-09-27 PROCEDURE — 97116 GAIT TRAINING THERAPY: CPT | Mod: CQ

## 2022-09-27 PROCEDURE — 11000001 HC ACUTE MED/SURG PRIVATE ROOM

## 2022-09-27 RX ADMIN — BENZTROPINE MESYLATE 2 MG: 1 TABLET ORAL at 09:09

## 2022-09-27 RX ADMIN — HALOPERIDOL 5 MG: 5 TABLET ORAL at 05:09

## 2022-09-27 RX ADMIN — HYDROCODONE BITARTRATE AND ACETAMINOPHEN 1 TABLET: 5; 325 TABLET ORAL at 01:09

## 2022-09-27 RX ADMIN — HYDROCODONE BITARTRATE AND ACETAMINOPHEN 1 TABLET: 5; 325 TABLET ORAL at 04:09

## 2022-09-27 RX ADMIN — HYDROCODONE BITARTRATE AND ACETAMINOPHEN 1 TABLET: 5; 325 TABLET ORAL at 09:09

## 2022-09-27 RX ADMIN — HYDROCODONE BITARTRATE AND ACETAMINOPHEN 1 TABLET: 5; 325 TABLET ORAL at 06:09

## 2022-09-27 NOTE — PT/OT/SLP PROGRESS
Physical Therapy         Treatment        Brandyn Ty   MRN: [id number]        PT Start Time: 1005     PT Stop Time: 1028    PT Total Time (min): 23 min       Billable Minutes:  Gait [id number] and Therapeutic Activity 8  Total Minutes: 23    Treatment Type: Treatment  PT/PTA: PTA     PTA Visit Number: 5       General Precautions: Standard,    Orthopedic Precautions: Orthopedic Precautions : LLE non weight bearing   Braces: Braces: N/A    Spiritual, Cultural Beliefs, Yazidi Practices, Values that Affect Care: no    Subjective:  Communicated with nurse prior to session.  Pt stated he felt very gassy today  Pain/Comfort  Pain Rating 1: 6/10  Location - Side 1: Left  Location 1: leg  Pain Addressed 1: Reposition, Distraction    Objective:  Patient found laying in bed upon arrival, with  vitals taken after gait tx, due to pt c/o feeling dizzy, BP- 129/74, HR 86    Functional Mobility:  Bed Mobility:   Supine to sit: Standby Assistance   Sit to supine: Activity did not occur   Rolling: Activity did not occur   Scooting: Standby Assistance    Balance:   Static Sit: GOOD: Takes MODERATE challenges from all directions  Dynamic Sit:  0: N/A  Static Stand: GOOD: Takes MODERATE challenges from all directions  Dynamic stand: 0: N/A    Transfer Training:  Sit to stand:Stand-by Assistance with Rolling Walker sit to stand from EOB  Pt t/f from toilet to bedside chair with a hop to gait, no LOB noted  Gait Training:  Patient gait trained NWB: left lower extremity 80  feet on level tile with Rolling Walker with CGA.  Pt with demonstarting a  swing-to gait with decreased pj and decreased step length.Impairments contributing to gait deviations include decreased ROM and with tc given for stability.     Activity Tolerance:  Patient tolerated treatment well    Patient left up in chair with call button in reach.    Assessment:  Brandyn Ty is a 62 y.o. male with a medical diagnosis of Displaced fracture of  lateral condyle of left femur, initial encounter for open fracture type IIIA, IIIB, or IIIC. Pt limited due to feeling gassy and waiting for bowls to move    Rehab potential is good.    Activity tolerance: Excellent    Discharge recommendations: Discharge Facility/Level of Care Needs: rehabilitation facility, nursing facility, skilled     Equipment recommendations: Equipment Needed After Discharge: walker, rolling     GOALS:   Multidisciplinary Problems       Physical Therapy Goals          Problem: Physical Therapy    Goal Priority Disciplines Outcome Goal Variances Interventions   Physical Therapy Goal     PT, PT/OT Ongoing, Progressing     Description: Goals to be met by: 2022     Patient will increase functional independence with mobility by performin. Supine to sit with Modified [place]  2. Sit to supine with Modified [place]  3. Sit to stand transfer with Mod I   4. Gait  x 200 feet with Modified [place] using Rolling Walker.                          PLAN:    Patient to be seen daily  to address the above listed problems via gait training, therapeutic activities  Plan of Care expires: 10/20/22  Plan of Care reviewed with: patient         2022

## 2022-09-27 NOTE — PLAN OF CARE
Meera updates  that pt is accepted and can go tomorrow. Covid and xray order placed.   updated and will put dc orders in tomorrow am  Meera will finish up in the am

## 2022-09-27 NOTE — PROGRESS NOTES
"Inpatient Nutrition Evaluation    Admit Date: 8/24/2022   Nutrition Recommendation/Prescription     Continue regular diet as tolerated      Nutrition Assessment     Chart Review    Reason Seen: length of stay    Diagnosis: Displaced fracture of lateral condyle of left femur, initial encounter for open fracture type IIIA, IIIB, or IIIC      Relevant Medical History: Schizophrenia, HTN     Nutrition-Related Medications: NaCl, zofran PRN    Nutrition-Related Labs: 8/30: WBC-13.2, RBC-3.25, H/H-9.6/30.9, glu-80  9/6: no recent labs  9/13: no recent labs  9/20: no recent labs  9/27: no recent labs      Diet Order: Diet Adult Regular  Oral Supplement Order: none at this time  Appetite/Oral Intake: good/% of meals  Factors Affecting Nutritional Intake: none identified at this time  Food/Jain/Cultural Preferences: none reported    Skin Integrity: incision  Wound(s):   n/a    Comments    8/30: pt reports good appetite, with constipation. UBW reported 185 lb with no weight loss.Admit weight of 83.9 kg (185 lb) on 8/24 and 8/25.    9/6: good appetite, eating 100% of most meals    9/13: pt sleeping; MD noted pt having diarrhea    9/20: pt sitting in his chair, just finished lunch, ate a sandwich and drank a coke and a sprite for lunch, good appetite, tolerating diet    9/27: pt continues with good appetite, reports increased amount of gas    Anthropometrics    Height: 5' 10" (177.8 cm)    Last Weight: 83.9 kg (185 lb) (08/25/22 2015) Weight Method: Bed Scale  BMI (Calculated): 26.5  BMI Classification: overweight (BMI 25-29.9)        Ideal Body Weight (IBW), Male: 166 lb  % Ideal Body Weight, Male (lb): 111.45 %                   Wt Readings from Last 5 Encounters:   08/25/22 83.9 kg (185 lb)   02/06/20 86.6 kg (191 lb)   11/11/19 87.6 kg (193 lb 3.2 oz)   10/23/19 86 kg (189 lb 9.6 oz)   08/22/19 87.5 kg (193 lb)     Weight Change(s) Since Admission:  Admit Weight: 83.9 kg (185 lb) (08/24/22 2115)  9/13: no updated " weight    Patient Education    Not applicable.    Monitoring & Evaluation     Dietitian will monitor food and beverage intake and weight change.  Nutrition Risk/Follow-Up: low (follow-up in 5-7 days)  Patients assigned 'low nutrition risk' status do not qualify for a full nutritional assessment but will be monitored and re-evaluated in a 5-7 day time period.

## 2022-09-27 NOTE — PROGRESS NOTES
[facility] MEDICINE   PROGRESS NOTE        CHIEF COMPLAINT   Hospital follow up    HOSPITAL COURSE   61 yo AAM with hx Schizophrenia and HTN who presents to the ED after a ground level fall landing on his left knee. He is an extremely poor historian. He sustained a displaced open femur fracture grade 3A vs B.  that warranted immediate surgical intervention. Dr. Chatterjee was consulted and pt underwent I&D and external fixation. The Hospitalist Service was consulted for medical management.    Was also found to have hyponatremia that is improving.  Plan for open reduction and internal fixation revision knee fusion today.  Patient ripped his IV lines last night and also refusing lab work.August 28 it patient had left knee arthrodesis revision and left knee external fixator adjustment  Orthopedic recs are to continue ex fix for 8 weeks and then removal, then begin progressive weight-bearing at that time.    S/p IM Haldol dose 100mg q monthly on 9/6  Unfortunately patient was denied by insurance for rehab or skilled nursing facility.  Change IV antibiotics to p.o. Keflex 500 mg q.6 hours hourly.  To complete a 10 day course for pin site possible infection.    Today  No new changes at this time.  Asking him me for a 20 dollar bill change.        OBJECTIVE/PHYSICAL EXAM     VITAL SIGNS (MOST RECENT):  Temp: 98 °F (36.7 °C) (09/27/22 0748)  Pulse: 82 (09/27/22 0748)  Resp: 19 (09/27/22 0748)  BP: (!) 142/87 (09/27/22 0748)  SpO2: 100 % (09/27/22 0748) VITAL SIGNS (24 HOUR RANGE):  Temp:  [98 °F (36.7 °C)] 98 °F (36.7 °C)  Pulse:  [82] 82  Resp:  [16-19] 19  SpO2:  [100 %] 100 %  BP: (142)/(87) 142/87   GENERAL: In no acute distress, afebrile  HEENT:  CHEST: Clear to auscultation bilaterally  HEART: S1, S2, no appreciable murmur  ABDOMEN: Soft, nontender, BS +  MSK: Warm, no lower extremity edema, no clubbing or cyanosis, left lower extremity in a external fixator  NEUROLOGIC: Alert and  oriented x4, moving all extremities with good strength   INTEGUMENTARY:  PSYCHIATRY:        ASSESSMENT/PLAN   Possible external fixator site insertion cellulitis   Acute watery diarrhea   Open distal left femur fracture status post I&D and ORIF August 24     History of:  Schizophrenia, essential hypertension, open distal left femur fracture status post I&D and ORIF August 24        Orthopedic surgery signed off.  Follow-up in 2 to 3 weeks with Dr. Chatterjee.  Ex fix x8 weeks.  Completed antibiotics.  Case management following.  Pending SNF.  Refused vitals, labs, and DVT prophylaxis     DVT prophylaxis:  Refusing prophylaxis    Anticipated discharge and disposition:   __________________________________________________________________________    LABS/MICRO/MEDS/DIAGNOSTICS       LABS  No results for input(s): NA, K, CHLORIDE, CO2, BUN, CREATININE, GLUCOSE, CALCIUM, ALKPHOS, AST, ALT, ALBUMIN in the last 72 hours.  No results for input(s): WBC, RBC, HCT, MCV, PLT in the last 72 hours.    Invalid input(s):     MICROBIOLOGY  Microbiology Results (last 7 days)       ** No results found for the last 168 hours. **            MEDICATIONS   benztropine  2 mg Oral Daily    haloperidoL  5 mg Oral Daily before evening meal    rivaroxaban  10 mg Oral Daily with dinner      INFUSIONS      DIAGNOSTIC TESTS  SURG FL Surgery Fluoro Usage   Final Result      CTA Lower Extremity Left   Final Result      1. Acute, nondisplaced fracture at the distal femoral metaphysis stabilized by an external fixator device.  This is at the level of the chronically ankylosed knee joint.  Fracture appears open with soft tissue gas extending from the anterior soft tissue defect in to the fracture.   2. No evidence of acute vascular injury.  Some portions of the vasculature are obscured related to beam hardening artifact from external fixator device.  The tip of the lower femoral fixation screw extends to just adjacent to the distal superficial femoral  artery without appreciable vascular injury or contact.   3. Two vessel runoff to the ankle.  Posterior tibial artery is diminutive and appears to be occluded proximally.  This is not definitively acute.   4. Trabeculated, distended bladder.   Minor discrepancy from preliminary report         Electronically signed by: Radha Valdivia   Date:    08/25/2022   Time:    10:13      SURG FL Surgery Fluoro Usage   Final Result      X-Ray Tibia Fibula 2 View Left   Final Result      No orthopedic hardware.         Electronically signed by: Radha Valdivia   Date:    08/24/2022   Time:    18:55      X-Ray Knee 1 or 2 View Left   Final Result      Acute on chronic appearing deformity at the left knee.  There appears to be have been bony fusion across the joint space with acute superimposed fracture versus pseudoarticulation.  There appears to be an anterior laceration.  Correlate for open fracture.         Electronically signed by: Radha Valdivia   Date:    08/24/2022   Time:    18:55      X-Ray Femur 2 View Left   Final Result      Acute on chronic appearing deformity at the left knee.  There appears to be have been bony fusion across the joint space with acute superimposed fracture versus pseudoarticulation.  There appears to be an anterior laceration.  Correlate for open fracture.         Electronically signed by: Radha Valdivia   Date:    08/24/2022   Time:    18:55      RADIOLOGY REPORT   Final Result               All diagnosis and differential diagnosis have been reviewed; assessment and plan has been documented. I have personally reviewed the labs and test results that are presently available; I have reviewed the patients medication list. I have reviewed the consulting providers response and recommendations. I have reviewed or attempted to review medical records based upon their availability.  All of the patient's questions have been addressed and answered. Patient's is agreeable to the above stated plan. I will  continue to monitor closely and make adjustments to medical management as needed.  This document was created using M*Modal Fluency Direct.  Transcription errors may have been made.  Please contact me if any questions may rise regarding documentation to clarify verbiage.        Kyler New MD   09/27/2022   Internal Medicine

## 2022-09-28 VITALS
TEMPERATURE: 98 F | HEIGHT: 70 IN | WEIGHT: 185 LBS | BODY MASS INDEX: 26.48 KG/M2 | RESPIRATION RATE: 18 BRPM | SYSTOLIC BLOOD PRESSURE: 123 MMHG | HEART RATE: 90 BPM | OXYGEN SATURATION: 99 % | DIASTOLIC BLOOD PRESSURE: 87 MMHG

## 2022-09-28 PROCEDURE — 25000003 PHARM REV CODE 250: Performed by: INTERNAL MEDICINE

## 2022-09-28 PROCEDURE — 25000003 PHARM REV CODE 250: Performed by: NURSE PRACTITIONER

## 2022-09-28 RX ORDER — HALOPERIDOL 5 MG/1
5 TABLET ORAL NIGHTLY
Qty: 30 TABLET | Refills: 0 | Status: SHIPPED | OUTPATIENT
Start: 2022-09-28

## 2022-09-28 RX ORDER — SIMVASTATIN 20 MG/1
20 TABLET, FILM COATED ORAL NIGHTLY
Qty: 90 TABLET | Refills: 3 | Status: SHIPPED | OUTPATIENT
Start: 2022-09-28 | End: 2023-09-28

## 2022-09-28 RX ORDER — BENZTROPINE MESYLATE 2 MG/1
2 TABLET ORAL DAILY
Qty: 30 TABLET | Refills: 0 | Status: SHIPPED | OUTPATIENT
Start: 2022-09-28 | End: 2022-10-28

## 2022-09-28 RX ORDER — HYDROCODONE BITARTRATE AND ACETAMINOPHEN 5; 325 MG/1; MG/1
1 TABLET ORAL EVERY 6 HOURS PRN
Qty: 20 TABLET | Refills: 0 | Status: SHIPPED | OUTPATIENT
Start: 2022-09-28 | End: 2022-10-05

## 2022-09-28 RX ADMIN — HYDROCODONE BITARTRATE AND ACETAMINOPHEN 1 TABLET: 5; 325 TABLET ORAL at 02:09

## 2022-09-28 RX ADMIN — HYDROCODONE BITARTRATE AND ACETAMINOPHEN 1 TABLET: 5; 325 TABLET ORAL at 06:09

## 2022-09-28 RX ADMIN — BENZTROPINE MESYLATE 2 MG: 1 TABLET ORAL at 09:09

## 2022-09-28 NOTE — PLAN OF CARE
Problem: Fall Injury Risk  Goal: Absence of Fall and Fall-Related Injury  Outcome: Met     Problem: Skin Injury Risk Increased  Goal: Skin Health and Integrity  Outcome: Met     Problem: Pain Acute  Goal: Acceptable Pain Control and Functional Ability  Outcome: Met     Problem: Adult Inpatient Plan of Care  Goal: Plan of Care Review  Outcome: Met  Goal: Patient-Specific Goal (Individualized)  Outcome: Met  Goal: Absence of Hospital-Acquired Illness or Injury  Outcome: Met  Goal: Optimal Comfort and Wellbeing  Outcome: Met  Goal: Readiness for Transition of Care  Outcome: Met

## 2022-09-28 NOTE — DISCHARGE SUMMARY
[facility]  HOSPITAL MEDICINE   DISCHARGE SUMMARY    Patient Name: Brandyn Ty  MRN: [id number]  Admission Date: 8/24/2022  Hospital Length of Stay: 35 days  Discharge Date and Time: 09/28/2022  Discharge Provider: Kyler New  Primary Care Provider: Elizabeth Marin MD      HOSPITAL COURSE   63 yo AAM with hx Schizophrenia and HTN who presents to the ED after a ground level fall landing on his left knee. He is an extremely poor historian. He sustained a displaced open femur fracture grade 3A vs B.  that warranted immediate surgical intervention. Dr. Chatterjee was consulted and pt underwent I&D and external fixation. The Hospitalist Service was consulted for medical management.    Was also found to have hyponatremia that is improving.  Plan for open reduction and internal fixation revision knee fusion today.  Patient ripped his IV lines last night and also refusing lab work.August 28 it patient had left knee arthrodesis revision and left knee external fixator adjustment  Orthopedic recs are to continue ex fix for 8 weeks and then removal, then begin progressive weight-bearing at that time.    S/p IM Haldol dose 100mg q monthly on 9/6  Unfortunately patient was denied by insurance for rehab or skilled nursing facility.  Change IV antibiotics to p.o. Keflex 500 mg q.6 hours hourly.  To complete a 10 day course for pin site possible infection which he has completed and tolerating well without any issues.  He was kept here numerous days pending placement at this time he has finally been approved for [facility].  Orthopedic surgery also evaluated him via x-ray yesterday and they said good to go from their standpoint.  They wanted him to have a the ex fix for 8 weeks total.  During hospitalization he had been refusing vitals, labs, DVT prophylaxis.        PHYSICAL EXAM     Most Recent Vital Signs:  Temp: 97.8 °F (36.6 °C) (09/28/22 0759)  Pulse: 90 (09/28/22 0759)  Resp: 18 (09/28/22  0759)  BP: 123/87 (09/28/22 0759)  SpO2: 99 % (09/28/22 0759)   GENERAL: In no acute distress, afebrile  HEENT:  CHEST: Clear to auscultation bilaterally  HEART: S1, S2, no appreciable murmur  ABDOMEN: Soft, nontender, BS +  MSK: Warm, no lower extremity edema, no clubbing or cyanosis, left lower extremity in a external fixator  NEUROLOGIC: Alert and oriented x4, moving all extremities with good strength           DISCHARGE DIAGNOSIS     External fixator site insertion cellulitis   Acute watery diarrhea-resolved   Open distal left femur fracture status post I&D and ORIF August 24     History of:  Schizophrenia, essential hypertension, open distal left femur fracture status post I&D and ORIF August 24        Orthopedic surgery signed off.  Follow-up with Dr. Chatterjee.  Ex fix x8 weeks.  Completed antibiotics.  Case management following.  Discharge to SNF today.  Refused vitals, labs, and DVT prophylaxis      _____________________________________________________________________________      DISCHARGE MED REC     Current Discharge Medication List        START taking these medications    Details   HYDROcodone-acetaminophen (NORCO) 5-325 mg per tablet Take 1 tablet by mouth every 6 (six) hours as needed for Pain.  Qty: 20 tablet, Refills: 0    Comments: Quantity prescribed more than 7 day supply? No      rivaroxaban (XARELTO) 10 mg Tab Take 1 tablet (10 mg total) by mouth daily with dinner or evening meal. Continue until ortho clears to be off  Qty: 30 tablet, Refills: 0           CONTINUE these medications which have CHANGED    Details   benztropine (COGENTIN) 2 MG Tab Take 1 tablet (2 mg total) by mouth once daily.  Qty: 30 tablet, Refills: 0      haloperidoL (HALDOL) 5 MG tablet Take 1 tablet (5 mg total) by mouth every evening.  Qty: 30 tablet, Refills: 0      simvastatin (ZOCOR) 20 MG tablet Take 1 tablet (20 mg total) by mouth every evening.  Qty: 90 tablet, Refills: 3    Associated Diagnoses: Hyperlipidemia,  unspecified hyperlipidemia type           STOP taking these medications       acetaminophen (TYLENOL) 500 MG tablet Comments:   Reason for Stopping:         amantadine HCl (SYMMETREL) 100 mg capsule Comments:   Reason for Stopping:         amoxicillin-clavulanate 875-125mg (AUGMENTIN) 875-125 mg per tablet Comments:   Reason for Stopping:         dicyclomine (BENTYL) 20 mg tablet Comments:   Reason for Stopping:         DULoxetine (CYMBALTA) 30 MG capsule Comments:   Reason for Stopping:         fluticasone propionate (FLONASE) 50 mcg/actuation nasal spray Comments:   Reason for Stopping:         naproxen (NAPROSYN) 500 MG tablet Comments:   Reason for Stopping:                  CONSULTS     Consults (From admission, onward)          Status Ordering Provider     Inpatient consult to Psychiatry  Once        Provider:  KAYE Rosas    Completed ISAIAS WISE     Inpatient consult to Hospitalist  Once        Provider:  (Not yet assigned)    Completed WING SALAS     Inpatient consult to Hospitalist  Once        Provider:  (Not yet assigned)    Acknowledged SHAWN VILLANUEVA              FOLLOW UP      Contact information for follow-up providers       Elizabeth Marin MD Follow up in 1 week(s).    Specialties: Internal Medicine, Pediatrics  Contact information:  [address] [facility] [id number] [phone]               Shawn Villanueva DO Follow up.    Specialty: Orthopedic Surgery  Contact information:  [address]  [address]  [facility] [address] [phone]                       Contact information for after-discharge care       Destination       [facility] .    Service: Skilled Nursing  Contact information:  [address] [facility] [id number] [phone]                                       DISCHARGE INSTRUCTIONS     Explained in detail to the patient about the discharge plan, medications, and follow-up visits. Pt understands and agrees with the treatment  plan.  Discharged Condition: stable  Diet as tolerated  Activities as tolerated  Discharge to: [facility]     TIME SPENT ON DISCHARGE   35 minutes        Kyler Saul M.D, on 9/28/2022  Internal Medicine  Department of Hospital Medicine    This document was created using electronic dictation services.  Please excuse any errors that may have been made.  Contact me if any questions regarding documentation to clarify verbiage.

## 2022-09-28 NOTE — PLAN OF CARE
Meera confirms neg covid and plan for discharge is proceeding as planned   available to assist if needed.

## 2022-09-28 NOTE — PROGRESS NOTES
Mr. Ty verbalized understanding of all discharge instructions, follow-up appointments, prescription usage, and s/s to be aware of that require immediate intervention. Report given to DIANE Simmons, at [facility]. All questions and concerns addressed at this time. Mr. Ty's belongings packed, and he is awaiting transport.

## 2022-10-19 ENCOUNTER — HOSPITAL ENCOUNTER (OUTPATIENT)
Dept: RADIOLOGY | Facility: CLINIC | Age: 62
Discharge: HOME | End: 2022-10-19
Attending: ORTHOPAEDIC SURGERY
Payer: MEDICARE

## 2022-10-19 ENCOUNTER — OFFICE VISIT (OUTPATIENT)
Dept: ORTHOPEDICS | Facility: CLINIC | Age: 62
End: 2022-10-19
Payer: MEDICARE

## 2022-10-19 VITALS
HEART RATE: 104 BPM | HEIGHT: 70 IN | BODY MASS INDEX: 26.48 KG/M2 | DIASTOLIC BLOOD PRESSURE: 67 MMHG | SYSTOLIC BLOOD PRESSURE: 106 MMHG | WEIGHT: 185 LBS

## 2022-10-19 DIAGNOSIS — S82.132D: Primary | ICD-10-CM

## 2022-10-19 DIAGNOSIS — S82.132D: ICD-10-CM

## 2022-10-19 PROCEDURE — 3074F PR MOST RECENT SYSTOLIC BLOOD PRESSURE < 130 MM HG: ICD-10-PCS | Mod: CPTII,,, | Performed by: PHYSICIAN ASSISTANT

## 2022-10-19 PROCEDURE — 73560 X-RAY EXAM OF KNEE 1 OR 2: CPT | Mod: LT,,, | Performed by: ORTHOPAEDIC SURGERY

## 2022-10-19 PROCEDURE — 3078F PR MOST RECENT DIASTOLIC BLOOD PRESSURE < 80 MM HG: ICD-10-PCS | Mod: CPTII,,, | Performed by: PHYSICIAN ASSISTANT

## 2022-10-19 PROCEDURE — 99024 POSTOP FOLLOW-UP VISIT: CPT | Mod: ,,, | Performed by: PHYSICIAN ASSISTANT

## 2022-10-19 PROCEDURE — 73560 XR KNEE 1 OR 2 VIEW LEFT: ICD-10-PCS | Mod: LT,,, | Performed by: ORTHOPAEDIC SURGERY

## 2022-10-19 PROCEDURE — 99024 PR POST-OP FOLLOW-UP VISIT: ICD-10-PCS | Mod: ,,, | Performed by: PHYSICIAN ASSISTANT

## 2022-10-19 PROCEDURE — 3074F SYST BP LT 130 MM HG: CPT | Mod: CPTII,,, | Performed by: PHYSICIAN ASSISTANT

## 2022-10-19 PROCEDURE — 3078F DIAST BP <80 MM HG: CPT | Mod: CPTII,,, | Performed by: PHYSICIAN ASSISTANT

## 2022-10-19 RX ORDER — CEPHALEXIN 500 MG/1
500 CAPSULE ORAL EVERY 8 HOURS
Qty: 42 CAPSULE | Refills: 0 | Status: SHIPPED | OUTPATIENT
Start: 2022-10-19 | End: 2022-11-02

## 2022-10-19 NOTE — PROGRESS NOTES
"Subjective:       Patient ID: rBandyn Ty is a 62 y.o. male.  Chief Complaint   Patient presents with    Left Knee - Pain     7.5 weeks. Left knee revision and ext fix adjustment. Pt is still having pain and discomfort. Overall doing the same.         HPI    Patient presents for approx 7.5 week post op visit following left knee revision fusion and ex fix placement. Still having pain about the knee and discomfort. Tenderness around his distal pin sites without purulent drainage. Denies fevers or chills. Has remained NWB to the LLE. Denies numbness and tingling distally. Patient with numerous fractures and injuries to the left leg over his lifetime.     ROS:  Constitutional: Denies fever chills  Eyes: No change in vision  ENT: No ringing or current infections  CV: No chest pain  Resp: No labored breathing  MSK: Pain evident at site of injury located in HPI,   Integ: No signs of abrasions or lacerations  Neuro: No numbness or tingling  Lymphatic: No swelling outside the area of injury     Current Outpatient Medications on File Prior to Visit   Medication Sig Dispense Refill    benztropine (COGENTIN) 2 MG Tab Take 1 tablet (2 mg total) by mouth once daily. 30 tablet 0    haloperidoL (HALDOL) 5 MG tablet Take 1 tablet (5 mg total) by mouth every evening. 30 tablet 0    rivaroxaban (XARELTO) 10 mg Tab Take 1 tablet (10 mg total) by mouth daily with dinner or evening meal. Continue until ortho clears to be off 30 tablet 0    simvastatin (ZOCOR) 20 MG tablet Take 1 tablet (20 mg total) by mouth every evening. 90 tablet 3     No current facility-administered medications on file prior to visit.          Objective:      /67   Pulse 104   Ht 5' 10" (1.778 m)   Wt 83.9 kg (185 lb)   BMI 26.54 kg/m²   Physical Exam  General the patient is alert and oriented x3 no acute distress nontoxic-appearing appropriate affect.    Constitutional: Vital signs are examined and stable.  Resp: No signs of labored " breathing    Musculoskeletal:     Left lower extremity: ex fix in place without loosening; incision healing well with small area to anterior knee slow to heal with eschar in in place. No drainage or erythema from incision sites; compartments are soft and compressible; no range of motion attempted; moderate tenderness to palpation around pin sites with mild erythema and drainage noted today; dorsi/plantar flexes the foot; SILT distally; BCR distally; DP pulse 2+      Body mass index is 26.54 kg/m².  Ideal body weight: 73 kg (160 lb 15 oz)  Adjusted ideal body weight: 77.4 kg (170 lb 9 oz)  No results found for: HGBA1C  Hgb   Date Value Ref Range Status   09/02/2022 9.2 (L) 14.0 - 18.0 gm/dL Final   08/30/2022 9.6 (L) 14.0 - 18.0 gm/dL Final     Hct   Date Value Ref Range Status   09/02/2022 30.2 (L) 42.0 - 52.0 % Final   08/30/2022 30.9 (L) 42.0 - 52.0 % Final     No results found for: IRON  No components found for: FROLATE  No results found for: [id number]  WBC   Date Value Ref Range Status   09/02/2022 11.1 4.5 - 11.5 x10(3)/mcL Final   08/30/2022 13.2 (H) 4.5 - 11.5 x10(3)/mcL Final       Radiology: 2 view x-rays of the left knee hardware intact without signs of loosening or failure. No minimal callus formation noted today as compared to previous images        Assessment:         1. Closed displaced fracture of medial condyle of left tibia with routine healing  cephALEXin (KEFLEX) 500 MG capsule    CANCELED: X-Ray Knee Complete 4 or More Views Left              Plan:         Follow up in about 2 weeks (around 11/2/2022), or if symptoms worsen or fail to improve.    Brandyn was seen today for pain.    Diagnoses and all orders for this visit:    Closed displaced fracture of medial condyle of left tibia with routine healing  -     Cancel: X-Ray Knee Complete 4 or More Views Left; Future  -     cephALEXin (KEFLEX) 500 MG capsule; Take 1 capsule (500 mg total) by mouth every 8 (eight) hours. for 14 days      -Follow  up in 2 weeks for evaluation of possible pin site infection and repeat x-rays.   -Remain NWB to the LLE at this time. NO range of motion of the knee.   - Leave incisions open to air without drainage  - Remain NWB for about 6 weeks and then plan to remove ex fix if healing well.   -ED precautions given    The above findings, diagnostics, and treatment plan were discussed with Dr. Chatterjee who is in agreement with the plan of care except as stated in additional documentation.       Obdulai Pereyra PA-C  [facility]   Orthopedic Trauma            Future Appointments   Date Time Provider Department Center   11/8/2022 10:15 AM [facility] [place]

## 2022-11-08 ENCOUNTER — HOSPITAL ENCOUNTER (OUTPATIENT)
Dept: RADIOLOGY | Facility: CLINIC | Age: 62
Discharge: HOME | End: 2022-11-08
Attending: ORTHOPAEDIC SURGERY
Payer: MEDICARE

## 2022-11-08 ENCOUNTER — OFFICE VISIT (OUTPATIENT)
Dept: ORTHOPEDICS | Facility: CLINIC | Age: 62
End: 2022-11-08
Payer: MEDICARE

## 2022-11-08 VITALS
HEIGHT: 70 IN | DIASTOLIC BLOOD PRESSURE: 81 MMHG | BODY MASS INDEX: 26.48 KG/M2 | WEIGHT: 185 LBS | SYSTOLIC BLOOD PRESSURE: 139 MMHG | TEMPERATURE: 99 F | HEART RATE: 90 BPM

## 2022-11-08 DIAGNOSIS — S82.132D: Primary | ICD-10-CM
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
## 2022-11-08 DIAGNOSIS — S82.132D: ICD-10-CM

## 2022-11-08 PROCEDURE — 3008F PR BODY MASS INDEX (BMI) DOCUMENTED: ICD-10-PCS | Mod: CPTII,,, | Performed by: PHYSICIAN ASSISTANT

## 2022-11-08 PROCEDURE — 99024 PR POST-OP FOLLOW-UP VISIT: ICD-10-PCS | Mod: ,,, | Performed by: PHYSICIAN ASSISTANT

## 2022-11-08 PROCEDURE — 99024 POSTOP FOLLOW-UP VISIT: CPT | Mod: ,,, | Performed by: PHYSICIAN ASSISTANT

## 2022-11-08 PROCEDURE — 3008F BODY MASS INDEX DOCD: CPT | Mod: CPTII,,, | Performed by: PHYSICIAN ASSISTANT

## 2022-11-08 PROCEDURE — 1159F PR MEDICATION LIST DOCUMENTED IN MEDICAL RECORD: ICD-10-PCS | Mod: CPTII,,, | Performed by: PHYSICIAN ASSISTANT

## 2022-11-08 PROCEDURE — 73560 XR KNEE 1 OR 2 VIEW LEFT: ICD-10-PCS | Mod: LT,,, | Performed by: ORTHOPAEDIC SURGERY

## 2022-11-08 PROCEDURE — 3079F PR MOST RECENT DIASTOLIC BLOOD PRESSURE 80-89 MM HG: ICD-10-PCS | Mod: CPTII,,, | Performed by: PHYSICIAN ASSISTANT

## 2022-11-08 PROCEDURE — 73560 X-RAY EXAM OF KNEE 1 OR 2: CPT | Mod: LT,,, | Performed by: ORTHOPAEDIC SURGERY

## 2022-11-08 PROCEDURE — 3075F PR MOST RECENT SYSTOLIC BLOOD PRESS GE 130-139MM HG: ICD-10-PCS | Mod: CPTII,,, | Performed by: PHYSICIAN ASSISTANT

## 2022-11-08 PROCEDURE — 3075F SYST BP GE 130 - 139MM HG: CPT | Mod: CPTII,,, | Performed by: PHYSICIAN ASSISTANT

## 2022-11-08 PROCEDURE — 1159F MED LIST DOCD IN RCRD: CPT | Mod: CPTII,,, | Performed by: PHYSICIAN ASSISTANT

## 2022-11-08 PROCEDURE — 73502 XR HIP WITH PELVIS WHEN PERFORMED, 2 OR 3 VIEWS LEFT: ICD-10-PCS | Mod: LT,,, | Performed by: ORTHOPAEDIC SURGERY

## 2022-11-08 PROCEDURE — 73502 X-RAY EXAM HIP UNI 2-3 VIEWS: CPT | Mod: LT,,, | Performed by: ORTHOPAEDIC SURGERY

## 2022-11-08 PROCEDURE — 3079F DIAST BP 80-89 MM HG: CPT | Mod: CPTII,,, | Performed by: PHYSICIAN ASSISTANT

## 2022-11-08 NOTE — PROGRESS NOTES
"Subjective:       Patient ID: Brandyn Ty is a 62 y.o. male.  Chief Complaint   Patient presents with    Left Knee - Follow-up     10 WEEK F/U FROM LEFT KNEE REVISION AND EXT FIX ADJUSTMENT 8/28/22. COMPLAINS OF PAIN WORSENING. HAD A FALL RECENTLY.         HPI  Patient 10 weeks post op left knee revision fusion. Ex fix remains in place. Patient recently fell and states he has been having increased pain at the hip and knee. He has remained Non weight bearing. Denies tobacco or nicotine use.     ROS:  Constitutional: Denies fever chills  Eyes: No change in vision  ENT: No ringing or current infections  CV: No chest pain  Resp: No labored breathing  MSK: Pain evident at site of injury located in HPI,   Integ: No signs of abrasions or lacerations  Neuro: No numbness or tingling  Lymphatic: No swelling outside the area of injury     Current Outpatient Medications on File Prior to Visit   Medication Sig Dispense Refill    haloperidoL (HALDOL) 5 MG tablet Take 1 tablet (5 mg total) by mouth every evening. 30 tablet 0    rivaroxaban (XARELTO) 10 mg Tab Take 1 tablet (10 mg total) by mouth daily with dinner or evening meal. Continue until ortho clears to be off 30 tablet 0    simvastatin (ZOCOR) 20 MG tablet Take 1 tablet (20 mg total) by mouth every evening. 90 tablet 3    benztropine (COGENTIN) 2 MG Tab Take 1 tablet (2 mg total) by mouth once daily. 30 tablet 0     No current facility-administered medications on file prior to visit.          Objective:      /81   Pulse 90   Temp 99.2 °F (37.3 °C)   Ht 5' 10" (1.778 m)   Wt 83.9 kg (185 lb)   BMI 26.54 kg/m²   Physical Exam  General the patient is alert and oriented x3 no acute distress nontoxic-appearing appropriate affect.    Constitutional: Vital signs are examined and stable.  Resp: No signs of labored breathing    Musculoskeletal:     Left lower extremity: Ex fix in place. Pin sites are clean and dry, no erythema or drainage. No cellulitis as " previously noted; compartments are soft and compressible; no ROM of the knee attempted; appropriate tenderness to palpation; dorsi/plantar flexes the foot; SILT distally; BCR distally; DP pulse palpable      Body mass index is 26.54 kg/m².  Ideal body weight: 73 kg (160 lb 15 oz)  Adjusted ideal body weight: 77.4 kg (170 lb 9 oz)  No results found for: HGBA1C  Hgb   Date Value Ref Range Status   09/02/2022 9.2 (L) 14.0 - 18.0 gm/dL Final   08/30/2022 9.6 (L) 14.0 - 18.0 gm/dL Final     Hct   Date Value Ref Range Status   09/02/2022 30.2 (L) 42.0 - 52.0 % Final   08/30/2022 30.9 (L) 42.0 - 52.0 % Final     No results found for: IRON  No components found for: FROLATE  No results found for: [id number]  WBC   Date Value Ref Range Status   09/02/2022 11.1 4.5 - 11.5 x10(3)/mcL Final   08/30/2022 13.2 (H) 4.5 - 11.5 x10(3)/mcL Final       Radiology: 3 view x ray of the left knee: hardware intact without signs of loosening or failure. Little to no change in consolidation of the fracture sites through previous fusion. Ex fix pins remain appropriately placed in bicortical fashion.         Assessment:         1. Closed displaced fracture of medial condyle of left tibia with routine healing  X-Ray Knee 1 or 2 View Left              Plan:         Follow up in about 4 weeks (around 12/6/2022), or if symptoms worsen or fail to improve.    Brandyn was seen today for follow-up.    Diagnoses and all orders for this visit:    Closed displaced fracture of medial condyle of left tibia with routine healing  -     X-Ray Knee 1 or 2 View Left; Future      - Remain NWB. At this time, fracture remains not healed. I do believe it is in his best interest to remain in ex fix for at least additional 4 weeks. His pin sites remain clean and dry and the ex fix remains tight without loosening today.   - I am hopeful that he will be able to have his ex fix removed in the coming months and we will continue to monitor with serial imaging as  indicated.   - Follow up in 4 weeks  -ED precautions given    The above findings, diagnostics, and treatment plan were discussed with Dr. Chatterjee who is in agreement with the plan of care except as stated in additional documentation.       Obdulia Pereyra PA-C          Future Appointments   Date Time Provider Department Center   12/6/2022 12:45 PM Shawn Chatterjee DO [facility]

## 2022-11-09 ENCOUNTER — HOSPITAL ENCOUNTER (EMERGENCY)
Facility: HOSPITAL | Age: 62
Discharge: HOME | End: 2022-11-09
Attending: EMERGENCY MEDICINE
Payer: MEDICARE

## 2022-11-09 VITALS
SYSTOLIC BLOOD PRESSURE: 140 MMHG | HEIGHT: 70 IN | OXYGEN SATURATION: 99 % | HEART RATE: 85 BPM | RESPIRATION RATE: 18 BRPM | DIASTOLIC BLOOD PRESSURE: 81 MMHG | BODY MASS INDEX: 27.06 KG/M2 | WEIGHT: 189 LBS | TEMPERATURE: 99 F
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
## 2022-11-09 DIAGNOSIS — S39.012A BACK STRAIN, INITIAL ENCOUNTER: Primary | ICD-10-CM

## 2022-11-09 PROCEDURE — 25000003 PHARM REV CODE 250: Performed by: NURSE PRACTITIONER

## 2022-11-09 PROCEDURE — 99283 EMERGENCY DEPT VISIT LOW MDM: CPT | Mod: 25

## 2022-11-09 RX ORDER — KETOROLAC TROMETHAMINE 30 MG/ML
30 INJECTION, SOLUTION INTRAMUSCULAR; INTRAVENOUS
Status: DISCONTINUED | OUTPATIENT
Start: 2022-11-09 | End: 2022-11-10 | Stop reason: HOSPADM

## 2022-11-09 RX ORDER — METHOCARBAMOL 750 MG/1
1500 TABLET, FILM COATED ORAL 3 TIMES DAILY
Qty: 30 TABLET | Refills: 0 | Status: SHIPPED | OUTPATIENT
Start: 2022-11-09 | End: 2022-11-14

## 2022-11-09 RX ORDER — HYDROCODONE BITARTRATE AND ACETAMINOPHEN 5; 325 MG/1; MG/1
1 TABLET ORAL
Status: COMPLETED | OUTPATIENT
Start: 2022-11-09 | End: 2022-11-09

## 2022-11-09 RX ADMIN — HYDROCODONE BITARTRATE AND ACETAMINOPHEN 1 TABLET: 5; 325 TABLET ORAL at 07:11

## 2022-11-10 NOTE — ED NOTES
"Called [facility] for update on ETA for transport for pt to go back. Nery, Nurse at [facility], states "we set up transport with [facility], they should be on their way"  "
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
## 2022-12-06 ENCOUNTER — OFFICE VISIT (OUTPATIENT)
Dept: ORTHOPEDICS | Facility: CLINIC | Age: 62
End: 2022-12-06
Payer: MEDICARE

## 2022-12-06 ENCOUNTER — HOSPITAL ENCOUNTER (OUTPATIENT)
Dept: RADIOLOGY | Facility: CLINIC | Age: 62
Discharge: HOME | End: 2022-12-06
Attending: ORTHOPAEDIC SURGERY
Payer: MEDICARE

## 2022-12-06 VITALS
HEIGHT: 70 IN | TEMPERATURE: 98 F | DIASTOLIC BLOOD PRESSURE: 79 MMHG | SYSTOLIC BLOOD PRESSURE: 118 MMHG | RESPIRATION RATE: 18 BRPM | HEART RATE: 95 BPM | WEIGHT: 188.94 LBS | BODY MASS INDEX: 27.05 KG/M2

## 2022-12-06 DIAGNOSIS — S82.132D: Primary | ICD-10-CM

## 2022-12-06 DIAGNOSIS — S82.132D: ICD-10-CM

## 2022-12-06 PROCEDURE — 99214 OFFICE O/P EST MOD 30 MIN: CPT | Mod: ,,, | Performed by: ORTHOPAEDIC SURGERY

## 2022-12-06 PROCEDURE — 3074F SYST BP LT 130 MM HG: CPT | Mod: CPTII,,, | Performed by: ORTHOPAEDIC SURGERY

## 2022-12-06 PROCEDURE — 3008F PR BODY MASS INDEX (BMI) DOCUMENTED: ICD-10-PCS | Mod: CPTII,,, | Performed by: ORTHOPAEDIC SURGERY

## 2022-12-06 PROCEDURE — 1159F MED LIST DOCD IN RCRD: CPT | Mod: CPTII,,, | Performed by: ORTHOPAEDIC SURGERY

## 2022-12-06 PROCEDURE — 1159F PR MEDICATION LIST DOCUMENTED IN MEDICAL RECORD: ICD-10-PCS | Mod: CPTII,,, | Performed by: ORTHOPAEDIC SURGERY

## 2022-12-06 PROCEDURE — 3078F PR MOST RECENT DIASTOLIC BLOOD PRESSURE < 80 MM HG: ICD-10-PCS | Mod: CPTII,,, | Performed by: ORTHOPAEDIC SURGERY

## 2022-12-06 PROCEDURE — 3078F DIAST BP <80 MM HG: CPT | Mod: CPTII,,, | Performed by: ORTHOPAEDIC SURGERY

## 2022-12-06 PROCEDURE — 3008F BODY MASS INDEX DOCD: CPT | Mod: CPTII,,, | Performed by: ORTHOPAEDIC SURGERY

## 2022-12-06 PROCEDURE — 3074F PR MOST RECENT SYSTOLIC BLOOD PRESSURE < 130 MM HG: ICD-10-PCS | Mod: CPTII,,, | Performed by: ORTHOPAEDIC SURGERY

## 2022-12-06 PROCEDURE — 73560 X-RAY EXAM OF KNEE 1 OR 2: CPT | Mod: LT,,, | Performed by: ORTHOPAEDIC SURGERY

## 2022-12-06 PROCEDURE — 73560 XR KNEE 1 OR 2 VIEW LEFT: ICD-10-PCS | Mod: LT,,, | Performed by: ORTHOPAEDIC SURGERY

## 2022-12-06 PROCEDURE — 99214 PR OFFICE/OUTPT VISIT, EST, LEVL IV, 30-39 MIN: ICD-10-PCS | Mod: ,,, | Performed by: ORTHOPAEDIC SURGERY

## 2022-12-06 RX ORDER — MUPIROCIN 20 MG/G
OINTMENT TOPICAL
Status: CANCELLED | OUTPATIENT
Start: 2022-12-06

## 2022-12-06 RX ORDER — SODIUM CHLORIDE 0.9 % (FLUSH) 0.9 %
10 SYRINGE (ML) INJECTION
Status: SHIPPED | OUTPATIENT
Start: 2022-12-06

## 2022-12-06 NOTE — PROGRESS NOTES
"Subjective:       Patient ID: Brandyn Ty is a 62 y.o. male.  Chief Complaint   Patient presents with    Left Knee - Follow-up     3.5 MONTH F/U LEFT KNEE REVISION FUSION, EX-FIX APPLICATION.       HPI:  Patient is 4 months out left knee revision fusion with ex fix application along with headless compression screws.  Patient was sitting in office and urinating on himself.  He certainly here with family member who states working on [employer].  He is adamantly against amputation at this time although I do believe it is in his best interest.  He has a draining sinus tract to his left knee.  An ex fix is still intact.  Dull achy pain left knee without radiation.  No signs erythema.    ROS:  Constitutional: Denies fever chills  Eyes: No change in vision  ENT: No ringing or current infections  CV: No chest pain  Resp: No labored breathing  MSK: Pain evident at site of injury located in HPI,   Integ: No signs of abrasions or lacerations  Neuro: No numbness or tingling  Lymphatic: No swelling outside the area of injury     Current Outpatient Medications on File Prior to Visit   Medication Sig Dispense Refill    haloperidoL (HALDOL) 5 MG tablet Take 1 tablet (5 mg total) by mouth every evening. 30 tablet 0    rivaroxaban (XARELTO) 10 mg Tab Take 1 tablet (10 mg total) by mouth daily with dinner or evening meal. Continue until ortho clears to be off 30 tablet 0    simvastatin (ZOCOR) 20 MG tablet Take 1 tablet (20 mg total) by mouth every evening. 90 tablet 3    benztropine (COGENTIN) 2 MG Tab Take 1 tablet (2 mg total) by mouth once daily. 30 tablet 0     No current facility-administered medications on file prior to visit.          Objective:      /79 (BP Location: Left arm, Patient Position: Sitting, BP Method: Large (Automatic))   Pulse 95   Temp 98 °F (36.7 °C) (Oral)   Resp 18   Ht 5' 10" (1.778 m)   Wt 85.7 kg (188 lb 15 oz)   BMI 27.11 kg/m²   General the patient is alert and oriented x3 no acute " distress nontoxic-appearing appropriate affect.    Constitutional: Vital signs are examined and stable.  Resp: No signs of labored breathing               LLE: -Skin:  Small draining sinus tract anterior medial aspect of the knee ex fix still intact No signs of new abrasions or lacerations, no scars           -MSK:  EHL/FHL, Gastroc/Tib anterior Strength 5/5           -Neuro:  Sensation intact to light touch L3-S1 dermatomes           -Lymphatic: No signs of lymphadenopathy           -CV: Capillary refill is less than 2 seconds. DP/PT pulses 2/4. Compartments soft and compressible    Body mass index is 27.11 kg/m².  Ideal body weight: 73 kg (160 lb 15 oz)  Adjusted ideal body weight: 78.1 kg (172 lb 2.2 oz)  No results found for: HGBA1C  Hgb   Date Value Ref Range Status   09/02/2022 9.2 (L) 14.0 - 18.0 gm/dL Final   08/30/2022 9.6 (L) 14.0 - 18.0 gm/dL Final     No results found for: [id number]  WBC   Date Value Ref Range Status   09/02/2022 11.1 4.5 - 11.5 x10(3)/mcL Final   08/30/2022 13.2 (H) 4.5 - 11.5 x10(3)/mcL Final       Radiology:  Two views left knee skeletally mature individual shows minimal signs of healing large necrotic bone mass without healing        Assessment:         1. Closed displaced fracture of medial condyle of left tibia with routine healing  X-Ray Knee 1 or 2 View Left    Vital signs    Cleanse with Chlorhexidine (CHG)    Diet NPO    Place DESTINEE hose    Place sequential compression device    Chlorohexidine Gluconate Bath    Case Request Operating Room: APPLICATION, EXTERNAL FIXATION DEVICE, LARGE, TIBIA    Place in Outpatient    Full code    Cleanse with Chlorhexidine (CHG)    Diet NPO    Place DESTINEE hose    Place sequential compression device    Full code              Plan:         No follow-ups on file.    Brandyn was seen today for follow-up.    Diagnoses and all orders for this visit:    Closed displaced fracture of medial condyle of left tibia with routine healing  -     X-Ray Knee 1 or  2 View Left; Future  -     Vital signs; Standing  -     Cleanse with Chlorhexidine (CHG); Standing  -     Diet NPO; Standing  -     Place DESTINEE hose; Standing  -     Place sequential compression device; Standing  -     Chlorohexidine Gluconate Bath; Standing  -     Case Request Operating Room: APPLICATION, EXTERNAL FIXATION DEVICE, LARGE, TIBIA  -     Place in Outpatient; Standing  -     Full code; Standing  -     Cleanse with Chlorhexidine (CHG)  -     Diet NPO  -     Place DESTINEE hose  -     Place sequential compression device  -     Full code    Other orders  -     sodium chloride 0.9% flush 10 mL  -     IP VTE LOW RISK PATIENT; Standing  -     mupirocin 2 % ointment  -     ceFAZolin (ANCEF) 2 g in dextrose 5 % 50 mL IVPB  -     IP VTE LOW RISK PATIENT      Patient has draining sinus tract also dense psychiatric history.  DPOA is being established.  Patient has ex fix in place.  No minimal signs of healing of the left knee.  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  My primary recommendation is amputation which is politely declined by the patient at this time who is still make his own medical decisions.  His family members present.  We will take him for ex fix removal knee immobilizer placement and sinus tract excision.    OR Friday.    I explained that surgery and the nature of their condition are not without risks. These include, but are not limited to, bleeding, infection, neurovascular compromise, malunion, nonunion, hardware complications, wound complications, scarring, cosmetic defects, need for later and/or repeated surgeries, pain, loss of ROM, loss of function, PTOA, deformity, stance/gait and/or functional abnormalities, thromboembolic complications, compartment syndrome, loss of limb, loss of life, anesthetic complications, and other imponderables. I explained that these can occur despite the adequacy of treatments rendered, and that their risks are heightened given the nature of their  condition. They verbalized understanding. They would like to continue with surgery at this time. If appropriate family was involved with surgical discussion.         This note/OR report was created with the assistance of  voice recognition software or phone  dictation.  There may be transcription errors as a result of using this technology however minimal. Effort has been made to assure accuracy of transcription but any obvious errors or omissions should be clarified with the author of the document.     Shawn Chatterjee DO  Orthopedic Trauma Surgery  12/06/2022      Future Appointments   Date Time Provider Department Center   12/28/2022  1:30 PM [facility] [place]

## 2022-12-06 NOTE — H&P (VIEW-ONLY)
"Subjective:       Patient ID: Brandyn Ty is a 62 y.o. male.  Chief Complaint   Patient presents with    Left Knee - Follow-up     3.5 MONTH F/U LEFT KNEE REVISION FUSION, EX-FIX APPLICATION.       HPI:  Patient is 4 months out left knee revision fusion with ex fix application along with headless compression screws.  Patient was sitting in office and urinating on himself.  He certainly here with family member who states working on [employer].  He is adamantly against amputation at this time although I do believe it is in his best interest.  He has a draining sinus tract to his left knee.  An ex fix is still intact.  Dull achy pain left knee without radiation.  No signs erythema.    ROS:  Constitutional: Denies fever chills  Eyes: No change in vision  ENT: No ringing or current infections  CV: No chest pain  Resp: No labored breathing  MSK: Pain evident at site of injury located in HPI,   Integ: No signs of abrasions or lacerations  Neuro: No numbness or tingling  Lymphatic: No swelling outside the area of injury     Current Outpatient Medications on File Prior to Visit   Medication Sig Dispense Refill    haloperidoL (HALDOL) 5 MG tablet Take 1 tablet (5 mg total) by mouth every evening. 30 tablet 0    rivaroxaban (XARELTO) 10 mg Tab Take 1 tablet (10 mg total) by mouth daily with dinner or evening meal. Continue until ortho clears to be off 30 tablet 0    simvastatin (ZOCOR) 20 MG tablet Take 1 tablet (20 mg total) by mouth every evening. 90 tablet 3    benztropine (COGENTIN) 2 MG Tab Take 1 tablet (2 mg total) by mouth once daily. 30 tablet 0     No current facility-administered medications on file prior to visit.          Objective:      /79 (BP Location: Left arm, Patient Position: Sitting, BP Method: Large (Automatic))   Pulse 95   Temp 98 °F (36.7 °C) (Oral)   Resp 18   Ht 5' 10" (1.778 m)   Wt 85.7 kg (188 lb 15 oz)   BMI 27.11 kg/m²   General the patient is alert and oriented x3 no acute " distress nontoxic-appearing appropriate affect.    Constitutional: Vital signs are examined and stable.  Resp: No signs of labored breathing               LLE: -Skin:  Small draining sinus tract anterior medial aspect of the knee ex fix still intact No signs of new abrasions or lacerations, no scars           -MSK:  EHL/FHL, Gastroc/Tib anterior Strength 5/5           -Neuro:  Sensation intact to light touch L3-S1 dermatomes           -Lymphatic: No signs of lymphadenopathy           -CV: Capillary refill is less than 2 seconds. DP/PT pulses 2/4. Compartments soft and compressible    Body mass index is 27.11 kg/m².  Ideal body weight: 73 kg (160 lb 15 oz)  Adjusted ideal body weight: 78.1 kg (172 lb 2.2 oz)  No results found for: HGBA1C  Hgb   Date Value Ref Range Status   09/02/2022 9.2 (L) 14.0 - 18.0 gm/dL Final   08/30/2022 9.6 (L) 14.0 - 18.0 gm/dL Final     No results found for: [id number]  WBC   Date Value Ref Range Status   09/02/2022 11.1 4.5 - 11.5 x10(3)/mcL Final   08/30/2022 13.2 (H) 4.5 - 11.5 x10(3)/mcL Final       Radiology:  Two views left knee skeletally mature individual shows minimal signs of healing large necrotic bone mass without healing        Assessment:         1. Closed displaced fracture of medial condyle of left tibia with routine healing  X-Ray Knee 1 or 2 View Left    Vital signs    Cleanse with Chlorhexidine (CHG)    Diet NPO    Place DESTINEE hose    Place sequential compression device    Chlorohexidine Gluconate Bath    Case Request Operating Room: APPLICATION, EXTERNAL FIXATION DEVICE, LARGE, TIBIA    Place in Outpatient    Full code    Cleanse with Chlorhexidine (CHG)    Diet NPO    Place DESTINEE hose    Place sequential compression device    Full code              Plan:         No follow-ups on file.    Brandyn was seen today for follow-up.    Diagnoses and all orders for this visit:    Closed displaced fracture of medial condyle of left tibia with routine healing  -     X-Ray Knee 1 or  2 View Left; Future  -     Vital signs; Standing  -     Cleanse with Chlorhexidine (CHG); Standing  -     Diet NPO; Standing  -     Place DESTINEE hose; Standing  -     Place sequential compression device; Standing  -     Chlorohexidine Gluconate Bath; Standing  -     Case Request Operating Room: APPLICATION, EXTERNAL FIXATION DEVICE, LARGE, TIBIA  -     Place in Outpatient; Standing  -     Full code; Standing  -     Cleanse with Chlorhexidine (CHG)  -     Diet NPO  -     Place DESTINEE hose  -     Place sequential compression device  -     Full code    Other orders  -     sodium chloride 0.9% flush 10 mL  -     IP VTE LOW RISK PATIENT; Standing  -     mupirocin 2 % ointment  -     ceFAZolin (ANCEF) 2 g in dextrose 5 % 50 mL IVPB  -     IP VTE LOW RISK PATIENT      Patient has draining sinus tract also dense psychiatric history.  DPOA is being established.  Patient has ex fix in place.  No minimal signs of healing of the left knee.  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  My primary recommendation is amputation which is politely declined by the patient at this time who is still make his own medical decisions.  His family members present.  We will take him for ex fix removal knee immobilizer placement and sinus tract excision.    OR Friday.    I explained that surgery and the nature of their condition are not without risks. These include, but are not limited to, bleeding, infection, neurovascular compromise, malunion, nonunion, hardware complications, wound complications, scarring, cosmetic defects, need for later and/or repeated surgeries, pain, loss of ROM, loss of function, PTOA, deformity, stance/gait and/or functional abnormalities, thromboembolic complications, compartment syndrome, loss of limb, loss of life, anesthetic complications, and other imponderables. I explained that these can occur despite the adequacy of treatments rendered, and that their risks are heightened given the nature of their  condition. They verbalized understanding. They would like to continue with surgery at this time. If appropriate family was involved with surgical discussion.         This note/OR report was created with the assistance of  voice recognition software or phone  dictation.  There may be transcription errors as a result of using this technology however minimal. Effort has been made to assure accuracy of transcription but any obvious errors or omissions should be clarified with the author of the document.     Shawn Chatterjee DO  Orthopedic Trauma Surgery  12/06/2022      Future Appointments   Date Time Provider Department Center   12/28/2022  1:30 PM [facility] [place]

## 2022-12-07 ENCOUNTER — ANESTHESIA EVENT (OUTPATIENT)
Dept: SURGERY | Facility: HOSPITAL | Age: 62
End: 2022-12-07
Payer: MEDICARE

## 2022-12-09 ENCOUNTER — HOSPITAL ENCOUNTER (OUTPATIENT)
Facility: HOSPITAL | Age: 62
Discharge: HOME | End: 2022-12-09
Attending: ORTHOPAEDIC SURGERY | Admitting: ORTHOPAEDIC SURGERY
Payer: MEDICARE

## 2022-12-09 ENCOUNTER — ANESTHESIA (OUTPATIENT)
Dept: SURGERY | Facility: HOSPITAL | Age: 62
End: 2022-12-09
Payer: MEDICARE

## 2022-12-09 VITALS
DIASTOLIC BLOOD PRESSURE: 85 MMHG | SYSTOLIC BLOOD PRESSURE: 148 MMHG | TEMPERATURE: 99 F | OXYGEN SATURATION: 98 % | HEART RATE: 99 BPM | RESPIRATION RATE: 16 BRPM

## 2022-12-09 DIAGNOSIS — S82.132F: Primary | ICD-10-CM

## 2022-12-09 DIAGNOSIS — S82.132D: ICD-10-CM

## 2022-12-09 LAB
GROUP & RH: NORMAL
INDIRECT COOMBS GEL: NORMAL

## 2022-12-09 PROCEDURE — 36000706: Performed by: ORTHOPAEDIC SURGERY

## 2022-12-09 PROCEDURE — 63600175 PHARM REV CODE 636 W HCPCS: Performed by: ORTHOPAEDIC SURGERY

## 2022-12-09 PROCEDURE — 20694 PR REMOVE EXTERN BONE FIX DEV W ANESTH: ICD-10-PCS | Mod: 51,,, | Performed by: ORTHOPAEDIC SURGERY

## 2022-12-09 PROCEDURE — 36000707: Performed by: ORTHOPAEDIC SURGERY

## 2022-12-09 PROCEDURE — 13160 SEC CLSR SURG WND/DEHSN XTN: CPT | Mod: LT,,, | Performed by: ORTHOPAEDIC SURGERY

## 2022-12-09 PROCEDURE — 13160 PR SECD CLOS SURG WND EXTEN/COMPLIC: ICD-10-PCS | Mod: LT,,, | Performed by: ORTHOPAEDIC SURGERY

## 2022-12-09 PROCEDURE — 25000003 PHARM REV CODE 250: Performed by: NURSE ANESTHETIST, CERTIFIED REGISTERED

## 2022-12-09 PROCEDURE — 63600175 PHARM REV CODE 636 W HCPCS: Performed by: NURSE ANESTHETIST, CERTIFIED REGISTERED

## 2022-12-09 PROCEDURE — 37000009 HC ANESTHESIA EA ADD 15 MINS: Performed by: ORTHOPAEDIC SURGERY

## 2022-12-09 PROCEDURE — 25000003 PHARM REV CODE 250: Performed by: ORTHOPAEDIC SURGERY

## 2022-12-09 PROCEDURE — 71000015 HC POSTOP RECOV 1ST HR: Performed by: ORTHOPAEDIC SURGERY

## 2022-12-09 PROCEDURE — 37000008 HC ANESTHESIA 1ST 15 MINUTES: Performed by: ORTHOPAEDIC SURGERY

## 2022-12-09 PROCEDURE — 25000003 PHARM REV CODE 250: Performed by: PHYSICIAN ASSISTANT

## 2022-12-09 PROCEDURE — 86901 BLOOD TYPING SEROLOGIC RH(D): CPT | Performed by: ORTHOPAEDIC SURGERY

## 2022-12-09 PROCEDURE — 99499 UNLISTED E&M SERVICE: CPT | Mod: ,,, | Performed by: PHYSICIAN ASSISTANT

## 2022-12-09 PROCEDURE — 20694 RMVL EXT FIXJ SYS UNDER ANES: CPT | Mod: 51,,, | Performed by: ORTHOPAEDIC SURGERY

## 2022-12-09 PROCEDURE — 99499 NO LOS: ICD-10-PCS | Mod: ,,, | Performed by: PHYSICIAN ASSISTANT

## 2022-12-09 PROCEDURE — 71000016 HC POSTOP RECOV ADDL HR: Performed by: ORTHOPAEDIC SURGERY

## 2022-12-09 PROCEDURE — 71000033 HC RECOVERY, INTIAL HOUR: Performed by: ORTHOPAEDIC SURGERY

## 2022-12-09 RX ORDER — METHOCARBAMOL 750 MG/1
750 TABLET, FILM COATED ORAL 3 TIMES DAILY
Status: DISCONTINUED | OUTPATIENT
Start: 2022-12-09 | End: 2022-12-09 | Stop reason: HOSPADM

## 2022-12-09 RX ORDER — VANCOMYCIN HYDROCHLORIDE 1 G/20ML
INJECTION, POWDER, LYOPHILIZED, FOR SOLUTION INTRAVENOUS
Status: DISCONTINUED | OUTPATIENT
Start: 2022-12-09 | End: 2022-12-09 | Stop reason: HOSPADM

## 2022-12-09 RX ORDER — DIPHENHYDRAMINE HYDROCHLORIDE 50 MG/ML
25 INJECTION INTRAMUSCULAR; INTRAVENOUS ONCE
Status: DISCONTINUED | OUTPATIENT
Start: 2022-12-09 | End: 2022-12-09 | Stop reason: HOSPADM

## 2022-12-09 RX ORDER — ONDANSETRON 2 MG/ML
4 INJECTION INTRAMUSCULAR; INTRAVENOUS DAILY PRN
Status: DISCONTINUED | OUTPATIENT
Start: 2022-12-09 | End: 2022-12-09 | Stop reason: HOSPADM

## 2022-12-09 RX ORDER — MUPIROCIN 20 MG/G
OINTMENT TOPICAL
Status: DISCONTINUED | OUTPATIENT
Start: 2022-12-09 | End: 2022-12-09 | Stop reason: HOSPADM

## 2022-12-09 RX ORDER — HYDROMORPHONE HYDROCHLORIDE 2 MG/ML
0.4 INJECTION, SOLUTION INTRAMUSCULAR; INTRAVENOUS; SUBCUTANEOUS EVERY 5 MIN PRN
Status: DISCONTINUED | OUTPATIENT
Start: 2022-12-09 | End: 2022-12-09 | Stop reason: HOSPADM

## 2022-12-09 RX ORDER — DEXAMETHASONE SODIUM PHOSPHATE 4 MG/ML
INJECTION, SOLUTION INTRA-ARTICULAR; INTRALESIONAL; INTRAMUSCULAR; INTRAVENOUS; SOFT TISSUE
Status: DISCONTINUED | OUTPATIENT
Start: 2022-12-09 | End: 2022-12-09

## 2022-12-09 RX ORDER — HYDROCODONE BITARTRATE AND ACETAMINOPHEN 5; 325 MG/1; MG/1
1 TABLET ORAL EVERY 4 HOURS PRN
Status: CANCELLED | OUTPATIENT
Start: 2022-12-09

## 2022-12-09 RX ORDER — ONDANSETRON 2 MG/ML
INJECTION INTRAMUSCULAR; INTRAVENOUS
Status: DISCONTINUED | OUTPATIENT
Start: 2022-12-09 | End: 2022-12-09

## 2022-12-09 RX ORDER — CEFAZOLIN SODIUM 2 G/50ML
2 SOLUTION INTRAVENOUS
Status: COMPLETED | OUTPATIENT
Start: 2022-12-09 | End: 2022-12-09

## 2022-12-09 RX ORDER — HYDROCODONE BITARTRATE AND ACETAMINOPHEN 5; 325 MG/1; MG/1
1 TABLET ORAL EVERY 4 HOURS PRN
Status: DISCONTINUED | OUTPATIENT
Start: 2022-12-09 | End: 2022-12-09 | Stop reason: HOSPADM

## 2022-12-09 RX ORDER — SODIUM CHLORIDE, SODIUM GLUCONATE, SODIUM ACETATE, POTASSIUM CHLORIDE AND MAGNESIUM CHLORIDE 30; 37; 368; 526; 502 MG/100ML; MG/100ML; MG/100ML; MG/100ML; MG/100ML
INJECTION, SOLUTION INTRAVENOUS CONTINUOUS
Status: DISCONTINUED | OUTPATIENT
Start: 2022-12-09 | End: 2022-12-09 | Stop reason: HOSPADM

## 2022-12-09 RX ORDER — LIDOCAINE HYDROCHLORIDE 10 MG/ML
1 INJECTION, SOLUTION EPIDURAL; INFILTRATION; INTRACAUDAL; PERINEURAL ONCE
Status: CANCELLED | OUTPATIENT
Start: 2022-12-09 | End: 2022-12-09

## 2022-12-09 RX ORDER — FENTANYL CITRATE 50 UG/ML
INJECTION, SOLUTION INTRAMUSCULAR; INTRAVENOUS
Status: DISCONTINUED | OUTPATIENT
Start: 2022-12-09 | End: 2022-12-09

## 2022-12-09 RX ORDER — MEPERIDINE HYDROCHLORIDE 25 MG/ML
12.5 INJECTION INTRAMUSCULAR; INTRAVENOUS; SUBCUTANEOUS ONCE
Status: DISCONTINUED | OUTPATIENT
Start: 2022-12-09 | End: 2022-12-09 | Stop reason: HOSPADM

## 2022-12-09 RX ORDER — PROPOFOL 10 MG/ML
VIAL (ML) INTRAVENOUS
Status: DISCONTINUED | OUTPATIENT
Start: 2022-12-09 | End: 2022-12-09

## 2022-12-09 RX ORDER — SODIUM CHLORIDE, SODIUM GLUCONATE, SODIUM ACETATE, POTASSIUM CHLORIDE AND MAGNESIUM CHLORIDE 30; 37; 368; 526; 502 MG/100ML; MG/100ML; MG/100ML; MG/100ML; MG/100ML
INJECTION, SOLUTION INTRAVENOUS CONTINUOUS
Status: CANCELLED | OUTPATIENT
Start: 2022-12-09 | End: 2023-01-08

## 2022-12-09 RX ORDER — ONDANSETRON 2 MG/ML
4 INJECTION INTRAMUSCULAR; INTRAVENOUS EVERY 6 HOURS PRN
Status: DISCONTINUED | OUTPATIENT
Start: 2022-12-09 | End: 2022-12-09 | Stop reason: HOSPADM

## 2022-12-09 RX ORDER — ONDANSETRON 4 MG/1
4 TABLET, ORALLY DISINTEGRATING ORAL ONCE
Status: CANCELLED | OUTPATIENT
Start: 2022-12-09 | End: 2022-12-09

## 2022-12-09 RX ORDER — ACETAMINOPHEN 325 MG/1
650 TABLET ORAL EVERY 4 HOURS PRN
Status: CANCELLED | OUTPATIENT
Start: 2022-12-09

## 2022-12-09 RX ORDER — KETOROLAC TROMETHAMINE 30 MG/ML
15 INJECTION, SOLUTION INTRAMUSCULAR; INTRAVENOUS ONCE
Status: DISCONTINUED | OUTPATIENT
Start: 2022-12-09 | End: 2022-12-09 | Stop reason: HOSPADM

## 2022-12-09 RX ORDER — EPHEDRINE SULFATE 50 MG/ML
INJECTION, SOLUTION INTRAVENOUS
Status: DISCONTINUED | OUTPATIENT
Start: 2022-12-09 | End: 2022-12-09

## 2022-12-09 RX ORDER — LEVETIRACETAM 500 MG/5ML
INJECTION, SOLUTION, CONCENTRATE INTRAVENOUS
Status: DISCONTINUED
Start: 2022-12-09 | End: 2022-12-09 | Stop reason: HOSPADM

## 2022-12-09 RX ORDER — HYDROCODONE BITARTRATE AND ACETAMINOPHEN 5; 325 MG/1; MG/1
1 TABLET ORAL EVERY 6 HOURS PRN
Qty: 28 TABLET | Refills: 0 | Status: SHIPPED | OUTPATIENT
Start: 2022-12-09 | End: 2022-12-16

## 2022-12-09 RX ORDER — BACITRACIN 500 [USP'U]/G
OINTMENT TOPICAL
Status: DISCONTINUED | OUTPATIENT
Start: 2022-12-09 | End: 2022-12-09 | Stop reason: HOSPADM

## 2022-12-09 RX ADMIN — EPHEDRINE SULFATE 25 MG: 50 INJECTION INTRAVENOUS at 08:12

## 2022-12-09 RX ADMIN — CEFAZOLIN SODIUM 2 G: 2 SOLUTION INTRAVENOUS at 08:12

## 2022-12-09 RX ADMIN — ONDANSETRON 4 MG: 2 INJECTION INTRAMUSCULAR; INTRAVENOUS at 08:12

## 2022-12-09 RX ADMIN — SODIUM CHLORIDE, SODIUM GLUCONATE, SODIUM ACETATE, POTASSIUM CHLORIDE AND MAGNESIUM CHLORIDE: 526; 502; 368; 37; 30 INJECTION, SOLUTION INTRAVENOUS at 08:12

## 2022-12-09 RX ADMIN — FENTANYL CITRATE 100 MCG: 50 INJECTION, SOLUTION INTRAMUSCULAR; INTRAVENOUS at 09:12

## 2022-12-09 RX ADMIN — DEXAMETHASONE SODIUM PHOSPHATE 4 MG: 4 INJECTION, SOLUTION INTRA-ARTICULAR; INTRALESIONAL; INTRAMUSCULAR; INTRAVENOUS; SOFT TISSUE at 08:12

## 2022-12-09 RX ADMIN — FENTANYL CITRATE 50 MCG: 50 INJECTION, SOLUTION INTRAMUSCULAR; INTRAVENOUS at 08:12

## 2022-12-09 RX ADMIN — PROPOFOL 150 MG: 10 INJECTION, EMULSION INTRAVENOUS at 08:12

## 2022-12-09 RX ADMIN — HYDROCODONE BITARTRATE AND ACETAMINOPHEN 1 TABLET: 5; 325 TABLET ORAL at 11:12

## 2022-12-09 NOTE — ANESTHESIA POSTPROCEDURE EVALUATION
Anesthesia Post Evaluation    Patient: Brandyn Ty    Procedure(s) Performed: Procedure(s) (LRB):  REMOVAL, EXTERNAL FIXATION DEVICE (Left)    Final Anesthesia Type: general      Patient location during evaluation: PACU  Patient participation: Yes- Able to Participate  Level of consciousness: awake and alert and oriented  Post-procedure vital signs: reviewed and stable  Pain management: adequate  Airway patency: patent  MARCIO mitigation strategies: Verification of full reversal of neuromuscular block  PONV status at discharge: No PONV  Anesthetic complications: no      Cardiovascular status: blood pressure returned to baseline and stable  Respiratory status: spontaneous ventilation and unassisted  Hydration status: euvolemic  Follow-up not needed.  Comments: [facility]          Vitals Value Taken Time   /74 12/09/22 0941     12/09/22 0942   Pulse 81 12/09/22 0941   Resp 10 12/09/22 0941   SpO2 100 % 12/09/22 0941   Vitals shown include unvalidated device data.      No case tracking events are documented in the log.      Pain/Mariely Score: Mariely Score: 4 (12/9/2022  9:30 AM)

## 2022-12-09 NOTE — TRANSFER OF CARE
Anesthesia Transfer of Care Note    Patient: Brandyn Ty    Procedure(s) Performed: Procedure(s) (LRB):  REMOVAL, EXTERNAL FIXATION DEVICE (Left)    Patient location: PACU    Anesthesia Type: general    Transport from OR: Transported from OR on room air with adequate spontaneous ventilation    Post pain: adequate analgesia    Post assessment: no apparent anesthetic complications    Post vital signs: stable    Level of consciousness: awake    Nausea/Vomiting: no nausea/vomiting    Complications: none    Transfer of care protocol was followed      Last vitals:   Visit Vitals  /68   Pulse 79   Temp 37.1 °C (98.7 °F) (Oral)   Resp 20   SpO2 100%

## 2022-12-09 NOTE — ANESTHESIA POSTPROCEDURE EVALUATION
Anesthesia Post Evaluation    Patient: Brandyn Ty    Procedure(s) Performed: Procedure(s) (LRB):  REMOVAL, EXTERNAL FIXATION DEVICE (Left)    Final Anesthesia Type: general      Patient location during evaluation: PACU  Patient participation: Yes- Able to Participate  Level of consciousness: awake and alert and oriented  Post-procedure vital signs: reviewed and stable  Pain management: adequate  Airway patency: patent  MARCIO mitigation strategies: Verification of full reversal of neuromuscular block  PONV status at discharge: No PONV  Anesthetic complications: no      Cardiovascular status: blood pressure returned to baseline and stable  Respiratory status: spontaneous ventilation and unassisted  Hydration status: euvolemic  Follow-up not needed.  Comments: [facility]          Vitals Value Taken Time   /85 12/09/22 1230     12/09/22 1505   Pulse 99 12/09/22 1230   Resp 16 12/09/22 1125   SpO2 98 % 12/09/22 1230         Event Time   Out of Recovery 10:12:00         Pain/Mariely Score: Pain Rating Prior to Med Admin: 10 (12/9/2022 11:25 AM)  Mariely Score: 10 (12/9/2022  1:20 PM)  Modified Mariely Score: 20 (12/9/2022 11:20 AM)

## 2022-12-09 NOTE — DISCHARGE SUMMARY
[facility] Periop Services  Discharge Note  Short Stay    Procedure(s) (LRB):  REMOVAL, EXTERNAL FIXATION DEVICE (Left)      OUTCOME: Patient tolerated treatment/procedure well without complication and is now ready for discharge.    DISPOSITION: Home or Self Care    FINAL DIAGNOSIS:  left knee external fixation removal    FOLLOWUP: In clinic in 2 weeks for wound check    DISCHARGE INSTRUCTIONS:    Discharge Procedure Orders   Other restrictions (specify):   Order Comments: WBAT in knee immobilizer only. May remove for showers and replace      Remove dressing in 72 hours   Order Comments: Replace with island dressings over incisions daily. Keep clean and dry x 7 days. May begin showers following this.         Clinical Reference Documents Added to Patient Instructions         Document    HARDWARE REMOVAL (ENGLISH)            TIME SPENT ON DISCHARGE: 15 minutes

## 2022-12-09 NOTE — ANESTHESIA PREPROCEDURE EVALUATION
12/09/2022  Brandyn Ty is a 62 y.o., male.  REMOVAL, EXTERNAL FIXATION DEVICE (Left)  Pre Proc, [facility] OR 08      Pre-op Assessment    I have reviewed the Patient Summary Reports.     I have reviewed the Nursing Notes. I have reviewed the NPO Status.   I have reviewed the Medications.     Review of Systems  Anesthesia Hx:  No problems with previous Anesthesia    Hematology/Oncology:  Hematology Normal   Oncology Normal     EENT/Dental:EENT/Dental Normal   Cardiovascular:   Exercise tolerance: good Hypertension  Functional Capacity good / => 4 METS    Pulmonary:  Pulmonary Normal    Renal/:   Denies Chronic Renal Disease.     Hepatic/GI:   Liver Disease, Hepatitis, C    Musculoskeletal:  Musculoskeletal Normal    Neurological:  Neurology Normal    Endocrine:  Endocrine Normal  Denies Morbid Obesity / BMI > 40  Dermatological:  Skin Normal    Psych:   Psychiatric History         Latest Reference Range & Units 09/02/22 06:22   WBC 4.5 - 11.5 x10(3)/mcL 11.1   RBC 4.70 - 6.10 x10(6)/mcL 3.14 (L)   HEMOGLOBIN 14.0 - 18.0 gm/dL 9.2 (L)   HEMATOCRIT 42.0 - 52.0 % 30.2 (L)   MCV 80.0 - 94.0 fL 96.2 (H)   MCH 27.0 - 31.0 pg 29.3   MCHC 33.0 - 36.0 mg/dL 30.5 (L)   RDW 11.5 - 17.0 % 14.3   Platelets 130 - 400 x10(3)/mcL 400   MPV 7.4 - 10.4 fL 9.0   Neut % % 70.9   LYMPH % % 13.1   Mono % % 10.0   Eosinophil % % 4.8   Basophil % % 0.5   Immature Granulocytes % 0.7   Neut # 2.1 - 9.2 x10(3)/mcL 7.8   Lymph # 0.6 - 4.6 x10(3)/mcL 1.45   Mono # 0.1 - 1.3 x10(3)/mcL 1.11   Eos # 0 - 0.9 x10(3)/mcL 0.53   Baso # 0 - 0.2 x10(3)/mcL 0.05   Immature Grans (Abs) 0 - 0.04 x10(3)/mcL 0.08 (H)   nRBC % 0.0   Sodium 136 - 145 mmol/L 136   Potassium 3.5 - 5.1 mmol/L 4.9   Chloride 98 - 107 mmol/L 103   CO2 23 - 31 mmol/L 27   ANION GAP mEq/L 6.0   BUN 8.4 - 25.7 mg/dL 9.3   Creatinine 0.73 - 1.18 mg/dL 0.77   BUN/CREAT  RATIO  12   eGFR mls/min/1.73/m2 >60   Glucose 82 - 115 mg/dL 80 (L)   Calcium 8.8 - 10.0 mg/dL 8.8   (L): Data is abnormally low  (H): Data is abnormally high    Physical Exam  General: Alert, Oriented, Well nourished and Cooperative    Airway:  Mallampati: II   Mouth Opening: Normal  TM Distance: Normal  Tongue: Normal  Neck ROM: Normal ROM    Dental:  Intact    Chest/Lungs:  Clear to auscultation, Normal Respiratory Rate    Heart:  Rate: Normal  Rhythm: Regular Rhythm        Anesthesia Plan  Type of Anesthesia, risks & benefits discussed:    Anesthesia Type: Gen Supraglottic Airway  Intra-op Monitoring Plan: Standard ASA Monitors  Post Op Pain Control Plan: multimodal analgesia  Induction:  IV and Inhalation  Airway Plan: Direct  Informed Consent: Informed consent signed with the Patient and all parties understand the risks and agree with anesthesia plan.  All questions answered. Patient consented to blood products? Yes  ASA Score: 3  Day of Surgery Review of History & Physical: H&P Update referred to the surgeon/provider.    Ready For Surgery From Anesthesia Perspective.     .

## 2022-12-09 NOTE — OP NOTE
OPERATIVE REPORT      Patient: Brandyn Ty   : 1960    MRN: [id number]  Date: 2022      Surgeon:Shawn Chatterjee DO  Assistant: Leo Pereyra was essential, part of the procedure including deep hardware placement and deep closure.  No senior assistant was availible  Preoperative Diagnosis:  Left knee draining sinus tract status post external fixation previous fracture through previous fusion site, non compliance, history of multiple left knee infections  Postoperative Diagnosis: Same  Procedure:  Left knee excision draining sinus tract 12273, left leg removal of ex fix 45249  Anesthesiologist:  Elvin Lisa MD   OR Staff: Circulator: Makenna Painter RN  Physician Assistant: Obdulia Pereyra PA-C  Scrub Person: ST Raphael  X-Ray Technologist: RT Lillie  Implants: * No implants in log *  EBL: 20cc  Complications: None  Disposition: To PACU, stable    Indications: Brandyn Ty is a 62 y.o. male presenting with the aforementioned injuries/findings. The procedure is indicated to remove intact external fixation device and closed previous surgical dehiscence sinus tract.  The patient is awake and alert. After thorough discussion of the risks, benefits, expected outcomes, and alternatives to surgical intervention, the patient agreed to proceed with surgical treatment.  Specific risks discussed included, but were not limited to: superficial or deep infection, wound healing complications, DVT/PE, significant bleeding requiring transfusion, damage to named anatomic structures in the immediate area including named neurovascular structures, infection, nonunion, malunion and general risks of anesthesia.  The patient voiced understanding and written as well as verbal consent was obtained by myself prior to the procedure.    I have had multiple discussions with the patient in the office with family members present my recommendation as above the knee  amputation.  He has been resistant politely declined the surgery.  We could not maintain external fixation device indefinitely.  He also has a small area of nonhealing wound.  Poor skin quality.  Likely a large massive necrotic bone which has low propensity to heal.  I do not believe repeat fusion in the patient's best interest due to his social complications and lack of support, soft tissue coverage is poor, bone void be greater than 10 cm, malnutrition, poor compliance.    Procedure Note:  The patient was brought back to the OR and placed supine on the OR table. After successful induction of anesthesia by anesthesia staff, the patient was positioned in the supine position and all bony prominences were padded appropriately.  The surgical field was then provisionally cleansed and then prepped and draped in the usual sterile fashion.    At this time a time-out was performed, with the correct patient, site, and procedure identified.  The universal time out as well as sign your site protocols were followed.  Preoperative antibiotics were verified as administered.     My attention is drawn to the left lower extremity.  External fixation device was removed without complication.  Attention was now drawn to the anterior knee patient has a surgical dehiscence sinus tract with granular tissue protruding from the wound.  It is proximally 1 cm.  I excised this with a 15 blade followed by a rongeur curette copious amounts irrigation.  I then used a key elevator free of soft tissue in the area that just scar down to his previous fusion site.  No signs of motion on x-ray after ex fix was removed.  We then created skin flaps to try to close over the area of surgical wound dehiscence.    The incision(s) was/were then irrigated using copious sterile saline and then vancomyocin was added to the wound bed for prophylaxis. The surgical wound was closed in layered fashion.  The surgical site(s) was/were were sterilely cleansed and  dressed.    The patient was then subsequently transferred to to PACU in a stable condition.    All sponge and needle counts were correct at the end of the case.  I was present and participated in all aspects of the procedure.    Prognosis:  The patient will be kept WBAT in the immobilizer indefinitely.     This note/OR report was created with the assistance of  voice recognition software or phone  dictation.  There may be transcription errors as a result of using this technology however minimal. Effort has been made to assure accuracy of transcription but any obvious errors or omissions should be clarified with the author of the document.       Shawn Chatterjee, DO  Orthopedic Trauma Surgery

## 2022-12-09 NOTE — DISCHARGE INSTRUCTIONS
-NO driving and NO alcohol consumption for 24 hours and while taking narcotic pain medication.    -Follow up appointment scheduled for:    -Wound care:Replace dressing beginning POD 3. Daily dressing change with island dressing over incisions. Keep clean and dry x 7 days. May begin showers afterwards.     -Monitor for signs of INFECTION: redness, swelling, drainage/pus/foul odor, fever, chills. Also, monitor extremity for good circulation (pink, warm, etc)    - Weight bearing status: Weight bearing as tolerated IN KNEE IMMOBILIZER ONLY. May remove for showers and replace.     -Apply ICE and ELEVATE extremity as needed to aid in pain and inflammation.    -Report to your nearest ER/Notify your doctor if you experience any SUDDEN/SEVERE chest pain/abdominal pain, weakness, trouble breathing, uncontrolled pain.

## 2022-12-09 NOTE — INTERVAL H&P NOTE
The patient has been examined and the H&P has been reviewed:    I concur with the findings and no changes have occurred since H&P was written.    Surgery risks, benefits and alternative options discussed and understood by patient/family.    I explained that surgery and the nature of their condition are not without risks. These include, but are not limited to, bleeding, infection, neurovascular compromise, malunion, nonunion, hardware complications, wound complications, scarring, cosmetic defects, need for later and/or repeated surgeries, pain, loss of ROM, loss of function, PTOA, deformity, stance/gait and/or functional abnormalities, thromboembolic complications, compartment syndrome, loss of limb, loss of life, anesthetic complications, and other imponderables. I explained that these can occur despite the adequacy of treatments rendered, and that their risks are heightened given the nature of their condition. They verbalized understanding. They would like to continue with surgery at this time. If appropriate family was involved with surgical discussion.     We would a long discussion about removing the ex fix.  Patient may fracture back through his fusion site.  He has dead bone in this area my recommendations above-the-knee amputation which he is politely declined.      There are no hospital problems to display for this patient.

## 2022-12-28 ENCOUNTER — OFFICE VISIT (OUTPATIENT)
Dept: ORTHOPEDICS | Facility: CLINIC | Age: 62
End: 2022-12-28
Payer: MEDICARE

## 2022-12-28 ENCOUNTER — HOSPITAL ENCOUNTER (OUTPATIENT)
Dept: RADIOLOGY | Facility: CLINIC | Age: 62
Discharge: HOME | End: 2022-12-28
Attending: ORTHOPAEDIC SURGERY
Payer: MEDICARE

## 2022-12-28 VITALS
HEIGHT: 70 IN | DIASTOLIC BLOOD PRESSURE: 72 MMHG | WEIGHT: 188 LBS | BODY MASS INDEX: 26.92 KG/M2 | TEMPERATURE: 98 F | HEART RATE: 84 BPM | SYSTOLIC BLOOD PRESSURE: 118 MMHG

## 2022-12-28 DIAGNOSIS — S82.132D: ICD-10-CM

## 2022-12-28 DIAGNOSIS — S82.132D: Primary | ICD-10-CM

## 2022-12-28 PROCEDURE — 99024 POSTOP FOLLOW-UP VISIT: CPT | Mod: ,,, | Performed by: ORTHOPAEDIC SURGERY

## 2022-12-28 PROCEDURE — 99024 PR POST-OP FOLLOW-UP VISIT: ICD-10-PCS | Mod: ,,, | Performed by: ORTHOPAEDIC SURGERY

## 2022-12-28 PROCEDURE — 3008F BODY MASS INDEX DOCD: CPT | Mod: CPTII,,, | Performed by: ORTHOPAEDIC SURGERY

## 2022-12-28 PROCEDURE — 3078F DIAST BP <80 MM HG: CPT | Mod: CPTII,,, | Performed by: ORTHOPAEDIC SURGERY

## 2022-12-28 PROCEDURE — 1159F PR MEDICATION LIST DOCUMENTED IN MEDICAL RECORD: ICD-10-PCS | Mod: CPTII,,, | Performed by: ORTHOPAEDIC SURGERY

## 2022-12-28 PROCEDURE — 3008F PR BODY MASS INDEX (BMI) DOCUMENTED: ICD-10-PCS | Mod: CPTII,,, | Performed by: ORTHOPAEDIC SURGERY

## 2022-12-28 PROCEDURE — 73560 X-RAY EXAM OF KNEE 1 OR 2: CPT | Mod: LT,,, | Performed by: ORTHOPAEDIC SURGERY

## 2022-12-28 PROCEDURE — 3074F SYST BP LT 130 MM HG: CPT | Mod: CPTII,,, | Performed by: ORTHOPAEDIC SURGERY

## 2022-12-28 PROCEDURE — 3074F PR MOST RECENT SYSTOLIC BLOOD PRESSURE < 130 MM HG: ICD-10-PCS | Mod: CPTII,,, | Performed by: ORTHOPAEDIC SURGERY

## 2022-12-28 PROCEDURE — 73560 XR KNEE 1 OR 2 VIEW LEFT: ICD-10-PCS | Mod: LT,,, | Performed by: ORTHOPAEDIC SURGERY

## 2022-12-28 PROCEDURE — 3078F PR MOST RECENT DIASTOLIC BLOOD PRESSURE < 80 MM HG: ICD-10-PCS | Mod: CPTII,,, | Performed by: ORTHOPAEDIC SURGERY

## 2022-12-28 PROCEDURE — 1159F MED LIST DOCD IN RCRD: CPT | Mod: CPTII,,, | Performed by: ORTHOPAEDIC SURGERY

## 2022-12-28 NOTE — PROGRESS NOTES
"Subjective:       Patient ID: Brandyn Ty is a 62 y.o. male.  Chief Complaint   Patient presents with    Left Lower Leg - Post-op Evaluation     3 week f/u from left ex fix removal . In wheelchair. Complains of pain and discomfort.       HPI:  Patient is 3 weeks out from left ex fix removal excision of sinus tract.  At her last visit patient's family was trying to get medical decision making ability.  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  I have recommended amputation which he is politely declined.  He understands that this may lead to death.  Continue chronic wound care.  Patient has continued sinus tract.    ROS:  Constitutional: Denies fever chills  Eyes: No change in vision  ENT: No ringing or current infections  CV: No chest pain  Resp: No labored breathing  MSK: Pain evident at site of injury located in HPI,   Integ: No signs of abrasions or lacerations  Neuro: No numbness or tingling  Lymphatic: No swelling outside the area of injury     Current Outpatient Medications on File Prior to Visit   Medication Sig Dispense Refill    haloperidoL (HALDOL) 5 MG tablet Take 1 tablet (5 mg total) by mouth every evening. 30 tablet 0    rivaroxaban (XARELTO) 10 mg Tab Take 1 tablet (10 mg total) by mouth daily with dinner or evening meal. Continue until ortho clears to be off 30 tablet 0    simvastatin (ZOCOR) 20 MG tablet Take 1 tablet (20 mg total) by mouth every evening. 90 tablet 3    benztropine (COGENTIN) 2 MG Tab Take 1 tablet (2 mg total) by mouth once daily. 30 tablet 0     Current Facility-Administered Medications on File Prior to Visit   Medication Dose Route Frequency Provider Last Rate Last Admin    sodium chloride 0.9% flush 10 mL  10 mL Intravenous PRN Shawn Chatterjee DO              Objective:      /72   Pulse 84   Temp 97.9 °F (36.6 °C)   Ht 5' 10" (1.778 m)   Wt 85.3 kg (188 lb)   BMI 26.98 kg/m²   General the patient is alert and oriented x3 no acute distress nontoxic-appearing " appropriate affect.    Constitutional: Vital signs are examined and stable.  Resp: No signs of labored breathing               LLE: -Skin:  Sinus tract no signs of purulence although appears to be nonhealing.  No signs of new abrasions or lacerations, no scars           -MSK: EHL/FHL, Gastroc/Tib anterior Strength 5/5           -Neuro:  Sensation intact to light touch L3-S1 dermatomes           -Lymphatic: No signs of lymphadenopathy           -CV: Capillary refill is less than 2 seconds. DP/PT pulses 2/4. Compartments soft and compressible                        Body mass index is 26.98 kg/m².  Ideal body weight: 73 kg (160 lb 15 oz)  Adjusted ideal body weight: 77.9 kg (171 lb 12.2 oz)  No results found for: HGBA1C  Hgb   Date Value Ref Range Status   09/02/2022 9.2 (L) 14.0 - 18.0 gm/dL Final   08/30/2022 9.6 (L) 14.0 - 18.0 gm/dL Final     No results found for: [id number]  WBC   Date Value Ref Range Status   09/02/2022 11.1 4.5 - 11.5 x10(3)/mcL Final   08/30/2022 13.2 (H) 4.5 - 11.5 x10(3)/mcL Final       Radiology:  Two views left knee skeletally mature individual showing intact hardware        Assessment:         1. Closed displaced fracture of medial condyle of left tibia with routine healing  X-Ray Knee 1 or 2 View Left              Plan:         No follow-ups on file.    Brandyn was seen today for post-op evaluation.    Diagnoses and all orders for this visit:    Closed displaced fracture of medial condyle of left tibia with routine healing  -     X-Ray Knee 1 or 2 View Left; Future      Patient has repeat draining sinus tract left knee.  Recommend chronic wound care.  Patient is alert and oriented appears to be making medical decisions for himself.  Recommending above-the-knee amputation which she politely declines.  He states even if it kills me unit removing my leg.  PDS suture removed.  Continue wound care at this time weightbearing as tolerated left lower extremity.  No further orthopedic care  needed        This note/OR report was created with the assistance of  voice recognition software or phone  dictation.  There may be transcription errors as a result of using this technology however minimal. Effort has been made to assure accuracy of transcription but any obvious errors or omissions should be clarified with the author of the document.     Shawn Chatterjee DO  Orthopedic Trauma Surgery  12/28/2022      No future appointments.

## 2023-10-06 ENCOUNTER — HOSPITAL ENCOUNTER (EMERGENCY)
Facility: HOSPITAL | Age: 63
Discharge: HOME | End: 2023-10-07
Attending: GENERAL ACUTE CARE HOSPITAL
Payer: MEDICARE

## 2023-10-06 DIAGNOSIS — R07.9 CHEST PAIN, UNSPECIFIED TYPE: Primary | ICD-10-CM

## 2023-10-06 DIAGNOSIS — R07.9 CHEST PAIN: ICD-10-CM

## 2023-10-06 PROCEDURE — 99285 EMERGENCY DEPT VISIT HI MDM: CPT | Mod: 25

## 2023-10-06 RX ORDER — NITROGLYCERIN 0.4 MG/1
0.4 TABLET SUBLINGUAL
Status: COMPLETED | OUTPATIENT
Start: 2023-10-07 | End: 2023-10-06

## 2023-10-06 RX ADMIN — NITROGLYCERIN 0.4 MG: 0.4 TABLET SUBLINGUAL at 11:10

## 2023-10-07 VITALS
BODY MASS INDEX: 30.58 KG/M2 | RESPIRATION RATE: 12 BRPM | WEIGHT: 213.63 LBS | HEART RATE: 75 BPM | HEIGHT: 70 IN | TEMPERATURE: 98 F | DIASTOLIC BLOOD PRESSURE: 74 MMHG | SYSTOLIC BLOOD PRESSURE: 133 MMHG | OXYGEN SATURATION: 96 %

## 2023-10-07 LAB
ALBUMIN SERPL-MCNC: 3.2 G/DL (ref 3.4–4.8)
ALBUMIN/GLOB SERPL: 0.6 RATIO (ref 1.1–2)
ALP SERPL-CCNC: 73 UNIT/L (ref 40–150)
ALT SERPL-CCNC: 13 UNIT/L (ref 0–55)
AST SERPL-CCNC: 14 UNIT/L (ref 5–34)
BASOPHILS # BLD AUTO: 0.09 X10(3)/MCL
BASOPHILS NFR BLD AUTO: 1 %
BILIRUB SERPL-MCNC: 0.2 MG/DL
BNP BLD-MCNC: <10 PG/ML
BUN SERPL-MCNC: 12 MG/DL (ref 8.4–25.7)
CALCIUM SERPL-MCNC: 9.3 MG/DL (ref 8.8–10)
CHLORIDE SERPL-SCNC: 104 MMOL/L (ref 98–107)
CO2 SERPL-SCNC: 22 MMOL/L (ref 23–31)
CREAT SERPL-MCNC: 0.78 MG/DL (ref 0.73–1.18)
EOSINOPHIL # BLD AUTO: 0.5 X10(3)/MCL (ref 0–0.9)
EOSINOPHIL NFR BLD AUTO: 5.8 %
ERYTHROCYTE [DISTWIDTH] IN BLOOD BY AUTOMATED COUNT: 13.5 % (ref 11.5–17)
GFR SERPLBLD CREATININE-BSD FMLA CKD-EPI: >60 MLS/MIN/1.73/M2
GLOBULIN SER-MCNC: 5.1 GM/DL (ref 2.4–3.5)
GLUCOSE SERPL-MCNC: 106 MG/DL (ref 82–115)
HCT VFR BLD AUTO: 38 % (ref 42–52)
HGB BLD-MCNC: 12 G/DL (ref 14–18)
IMM GRANULOCYTES # BLD AUTO: 0.03 X10(3)/MCL (ref 0–0.04)
IMM GRANULOCYTES NFR BLD AUTO: 0.3 %
LIPASE SERPL-CCNC: 31 U/L
LYMPHOCYTES # BLD AUTO: 2.5 X10(3)/MCL (ref 0.6–4.6)
LYMPHOCYTES NFR BLD AUTO: 29 %
MAGNESIUM SERPL-MCNC: 2.1 MG/DL (ref 1.6–2.6)
MCH RBC QN AUTO: 28.2 PG (ref 27–31)
MCHC RBC AUTO-ENTMCNC: 31.6 G/DL (ref 33–36)
MCV RBC AUTO: 89.2 FL (ref 80–94)
MONOCYTES # BLD AUTO: 0.73 X10(3)/MCL (ref 0.1–1.3)
MONOCYTES NFR BLD AUTO: 8.5 %
NEUTROPHILS # BLD AUTO: 4.76 X10(3)/MCL (ref 2.1–9.2)
NEUTROPHILS NFR BLD AUTO: 55.4 %
PLATELET # BLD AUTO: 449 X10(3)/MCL (ref 130–400)
PMV BLD AUTO: 8.7 FL (ref 7.4–10.4)
POTASSIUM SERPL-SCNC: 4.3 MMOL/L (ref 3.5–5.1)
PROT SERPL-MCNC: 8.3 GM/DL (ref 5.8–7.6)
RBC # BLD AUTO: 4.26 X10(6)/MCL (ref 4.7–6.1)
SODIUM SERPL-SCNC: 135 MMOL/L (ref 136–145)
TROPONIN I SERPL-MCNC: 0.01 NG/ML (ref 0–0.04)
TROPONIN I SERPL-MCNC: 0.02 NG/ML (ref 0–0.04)
WBC # SPEC AUTO: 8.61 X10(3)/MCL (ref 4.5–11.5)

## 2023-10-07 PROCEDURE — 85025 COMPLETE CBC W/AUTO DIFF WBC: CPT | Performed by: GENERAL ACUTE CARE HOSPITAL

## 2023-10-07 PROCEDURE — 83690 ASSAY OF LIPASE: CPT | Performed by: GENERAL ACUTE CARE HOSPITAL

## 2023-10-07 PROCEDURE — 84484 ASSAY OF TROPONIN QUANT: CPT | Performed by: GENERAL ACUTE CARE HOSPITAL

## 2023-10-07 PROCEDURE — 83880 ASSAY OF NATRIURETIC PEPTIDE: CPT | Performed by: GENERAL ACUTE CARE HOSPITAL

## 2023-10-07 PROCEDURE — 80053 COMPREHEN METABOLIC PANEL: CPT | Performed by: GENERAL ACUTE CARE HOSPITAL

## 2023-10-07 PROCEDURE — 83735 ASSAY OF MAGNESIUM: CPT | Performed by: GENERAL ACUTE CARE HOSPITAL

## 2023-10-07 PROCEDURE — 25000242 PHARM REV CODE 250 ALT 637 W/ HCPCS: Performed by: GENERAL ACUTE CARE HOSPITAL

## 2023-10-07 PROCEDURE — 93005 ELECTROCARDIOGRAM TRACING: CPT

## 2023-10-07 RX ORDER — MAG HYDROX/ALUMINUM HYD/SIMETH 200-200-20
30 SUSPENSION, ORAL (FINAL DOSE FORM) ORAL
Status: DISCONTINUED | OUTPATIENT
Start: 2023-10-07 | End: 2023-10-07

## 2023-10-07 NOTE — ED PROVIDER NOTES
Encounter Date: 10/6/2023       History     Chief Complaint   Patient presents with    Chest Pain     Pt presents to the ER complaining of chest pain for 2 days, nursing home states that he hadnt said anything about it the past 2 days. Pt given 324 of aspirin en route and 1 sublingual nitro. Pt states that the pain comes and goes, denies shortness of breath, denies cough.      Patient was brought emergency room from nursing home by EMS with chief complaints of a chest pain on the right side for 2 days, intermittent, achy, worse after eating  nauseous, denies previous history of heart problems, still has a gallbladder    The history is provided by the EMS personnel, the nursing home and the patient.     Review of patient's allergies indicates:   Allergen Reactions    Chlorpromazine      Other reaction(s): Chlorpromazine, Unknown - See comments    Fluphenazine      Other reaction(s): Fluphenazine, Unknown - See comments    Trazodone      Other reaction(s): Trazodone, Unknown - See comments     Past Medical History:   Diagnosis Date    Anxiety     Hypertension      Past Surgical History:   Procedure Laterality Date    APPLICATION OF LARGE EXTERNAL FIXATION DEVICE TO TIBIA Left 08/24/2022    Procedure: APPLICATION, EXTERNAL FIXATION DEVICE, LARGE, TIBIA;  Surgeon: Shawn Chatterjee DO;  Location: [facility];  Service: Orthopedics;  Laterality: Left;    KNEE ARTHRODESIS Left 08/28/2022    Procedure: arthrodesis ;  Surgeon: Shawn Chatterjee DO;  Location: [facility] OR;  Service: Orthopedics;  Laterality: Left;  Left knee arthrodesis    REMOVAL OF EXTERNAL FIXATION DEVICE Left 12/09/2022    Procedure: REMOVAL, EXTERNAL FIXATION DEVICE;  Surgeon: Shawn Chatterjee DO;  Location: [facility] OR;  Service: Orthopedics;  Laterality: Left;  remove ex fix, wash kneem LMA okay    REVISION OF PROCEDURE INVOLVING EXTERNAL FIXATION DEVICE  08/28/2022    Procedure: REVISION, OF EXTERNAL FIXATION;  Surgeon: Shawn Chatterjee DO;  Location: [facility] OR;  Service:  Orthopedics;;     Family History   Problem Relation Age of Onset    No Known Problems Mother     No Known Problems Father     No Known Problems Sister     No Known Problems Brother     No Known Problems Maternal Aunt     No Known Problems Maternal Uncle     No Known Problems Paternal Aunt     No Known Problems Paternal Uncle     No Known Problems Maternal Grandmother     No Known Problems Maternal Grandfather     No Known Problems Paternal Grandmother     No Known Problems Paternal Grandfather     No Known Problems Other      Social History     Tobacco Use    Smoking status: Never    Smokeless tobacco: Never   Substance Use Topics    Alcohol use: Not Currently    Drug use: Never     Review of Systems   Cardiovascular:  Positive for chest pain.   All other systems reviewed and are negative.      Physical Exam     Initial Vitals [10/06/23 2345]   BP Pulse Resp Temp SpO2   (!) 141/92 98 18 98.4 °F (36.9 °C) 98 %      MAP       --         Physical Exam    Nursing note and vitals reviewed.  Constitutional: He appears well-developed and well-nourished.   HENT:   Right Ear: External ear normal.   Left Ear: External ear normal.   Eyes: Pupils are equal, round, and reactive to light.   Neck:   Normal range of motion.  Cardiovascular:  Normal rate and regular rhythm.           Pulmonary/Chest: Breath sounds normal.   Abdominal: Abdomen is soft. Bowel sounds are normal. There is abdominal tenderness in the epigastric area.   Musculoskeletal:         General: Normal range of motion.      Cervical back: Normal range of motion.     Neurological: He is oriented to person, place, and time. He has normal reflexes.   Skin: Skin is warm. Capillary refill takes 2 to 3 seconds.   Psychiatric: His behavior is normal. Thought content normal.         ED Course   Procedures  Labs Reviewed   COMPREHENSIVE METABOLIC PANEL - Abnormal; Notable for the following components:       Result Value    Sodium Level 135 (*)     Carbon Dioxide 22 (*)      Protein Total 8.3 (*)     Albumin Level 3.2 (*)     Globulin 5.1 (*)     Albumin/Globulin Ratio 0.6 (*)     All other components within normal limits   CBC WITH DIFFERENTIAL - Abnormal; Notable for the following components:    RBC 4.26 (*)     Hgb 12.0 (*)     Hct 38.0 (*)     MCHC 31.6 (*)     Platelet 449 (*)     All other components within normal limits   B-TYPE NATRIURETIC PEPTIDE - Normal   LIPASE - Normal   MAGNESIUM - Normal   TROPONIN I - Normal   TROPONIN I - Normal   CBC W/ AUTO DIFFERENTIAL    Narrative:     The following orders were created for panel order CBC auto differential.  Procedure                               Abnormality         Status                     ---------                               -----------         ------                     CBC with Differential[[id number]]        Abnormal            Final result                 Please view results for these tests on the individual orders.     EKG Readings: (Independently Interpreted)   Rhythm: Sinus Tachycardia. Heart Rate: 97. Ectopy: No Ectopy. Conduction: Normal. ST Segments: Normal ST Segments. T Waves: Normal. Axis: Normal. Clinical Impression: Sinus Tachycardia       Imaging Results              X-Ray Chest AP Portable (In process)                      Medications   nitroGLYCERIN SL tablet 0.4 mg (0.4 mg Sublingual Given 10/6/23 2300)     Medical Decision Making   Patient was brought emergency room from nursing home by EMS with chief complaints of a chest pain on the right side for 2 days, intermittent, achy, worse after eating  nauseous, denies previous history of heart problems, still has a gallbladder, patient received Maalox prior of ER arrival, received aspirin 325 mg and NTG 0.4 mg by EMS     Physical exam revealed no pain distress there is epigastric abdominal tenderness, normal heart exam    Amount and/or Complexity of Data Reviewed  Labs: ordered.  Radiology: ordered.  Discussion of management or test interpretation with external  provider(s):  Differential diagnosis:  Gastritis, STEMI, non-STEMI, pneumonia, PE, dissection.  Patient has no specific chest pain which was not relieved by aspirin and nitroglycerin, received dose of Mylanta, troponin is negative, there is no signs of ACS on EKG, 2nd troponin is negative as well I think that is patient's symptoms related to GERD/gastritis    Risk  Prescription drug management.               ED Course as of 10/07/23 0259   Sat Oct 07, 2023   0041 CXR revealed no consolidation [IP]   0110  1st troponin is negative, but patient requires 2nd troponin in 3 hours, he is chest pain-free right now [IP]      ED Course User Index  [IP] Katy Sheppard MD                    Clinical Impression:   Final diagnoses:  [R07.9] Chest pain  [R07.9] Chest pain, unspecified type (Primary)        ED Disposition Condition    Discharge Stable          ED Prescriptions    None       Follow-up Information       Follow up With Specialties Details Why Contact Info    Kai Duong MD Internal Medicine In 3 days If symptoms worsen [address]  [address]  [facility] [place] [facility] [address]  [phone]               Katy Sheppard MD  10/07/23 0259

## 2024-02-21 ENCOUNTER — HOSPITAL ENCOUNTER (EMERGENCY)
Facility: HOSPITAL | Age: 64
Discharge: HOME | End: 2024-02-21
Attending: INTERNAL MEDICINE
Payer: MEDICARE

## 2024-02-21 VITALS
HEIGHT: 71 IN | DIASTOLIC BLOOD PRESSURE: 100 MMHG | HEART RATE: 90 BPM | WEIGHT: 208 LBS | TEMPERATURE: 98 F | RESPIRATION RATE: 18 BRPM | SYSTOLIC BLOOD PRESSURE: 173 MMHG | BODY MASS INDEX: 29.12 KG/M2 | OXYGEN SATURATION: 99 %

## 2024-02-21 DIAGNOSIS — F60.5: Primary | ICD-10-CM

## 2024-02-21 PROCEDURE — 99283 EMERGENCY DEPT VISIT LOW MDM: CPT

## 2024-02-21 NOTE — ED PROVIDER NOTES
Encounter Date: 2/21/2024       History     Chief Complaint   Patient presents with    OPC    Psychiatric Evaluation     OPC transported to the ed (via ems) secondary to psychiatric evaluation. Calm and cooperative. Cleared by [facility] security upon arrival. Coy beatty     Presents under OPC from his . OPC states that he is self medicating and is non compliant with his medication and wound care. Patient denies, states he takes his medications as prescribed but admits he does not want the nurses to take care of his foot but they change his bandage.     Pt with sitters care at home from 9 AM to 7 PM daily. His sitter is with him in ED, states he does not like them to help him but they supervise him taking his medications, self hygiene and cook.     The history is provided by the patient, the EMS personnel and a caregiver.     Review of patient's allergies indicates:   Allergen Reactions    Chlorpromazine      Other reaction(s): Chlorpromazine, Unknown - See comments    Fluphenazine      Other reaction(s): Fluphenazine, Unknown - See comments    Trazodone      Other reaction(s): Trazodone, Unknown - See comments     Past Medical History:   Diagnosis Date    Anxiety     Hypertension      Past Surgical History:   Procedure Laterality Date    APPLICATION OF LARGE EXTERNAL FIXATION DEVICE TO TIBIA Left 08/24/2022    Procedure: APPLICATION, EXTERNAL FIXATION DEVICE, LARGE, TIBIA;  Surgeon: Shawn Chatterjee DO;  Location: [facility];  Service: Orthopedics;  Laterality: Left;    KNEE ARTHRODESIS Left 08/28/2022    Procedure: arthrodesis ;  Surgeon: Shawn Chatterjee DO;  Location: [facility] OR;  Service: Orthopedics;  Laterality: Left;  Left knee arthrodesis    REMOVAL OF EXTERNAL FIXATION DEVICE Left 12/09/2022    Procedure: REMOVAL, EXTERNAL FIXATION DEVICE;  Surgeon: Shawn Chatterjee DO;  Location: [facility] OR;  Service: Orthopedics;  Laterality: Left;  remove ex fix, wash kneem LMA okay    REVISION OF PROCEDURE INVOLVING EXTERNAL  FIXATION DEVICE  08/28/2022    Procedure: REVISION, OF EXTERNAL FIXATION;  Surgeon: Shawn Chatterjee DO;  Location: [facility] OR;  Service: Orthopedics;;     Family History   Problem Relation Age of Onset    No Known Problems Mother     No Known Problems Father     No Known Problems Sister     No Known Problems Brother     No Known Problems Maternal Aunt     No Known Problems Maternal Uncle     No Known Problems Paternal Aunt     No Known Problems Paternal Uncle     No Known Problems Maternal Grandmother     No Known Problems Maternal Grandfather     No Known Problems Paternal Grandmother     No Known Problems Paternal Grandfather     No Known Problems Other      Social History     Tobacco Use    Smoking status: Never    Smokeless tobacco: Never   Substance Use Topics    Alcohol use: Not Currently    Drug use: Never     Review of Systems   Constitutional:  Negative for fever.   HENT:  Negative for sore throat.    Respiratory:  Negative for shortness of breath.    Cardiovascular:  Negative for chest pain.   Gastrointestinal:  Negative for nausea.   Genitourinary:  Negative for dysuria.   Musculoskeletal:  Negative for back pain.   Skin:  Negative for rash.   Neurological:  Negative for weakness.   Hematological:  Does not bruise/bleed easily.   All other systems reviewed and are negative.      Physical Exam     Initial Vitals [02/21/24 1122]   BP Pulse Resp Temp SpO2   (!) 173/100 90 18 97.7 °F (36.5 °C) 99 %      MAP       --         Physical Exam    Nursing note and vitals reviewed.  Constitutional: He appears well-developed and well-nourished. No distress.   HENT:   Head: Normocephalic and atraumatic.   Eyes: Conjunctivae are normal. Pupils are equal, round, and reactive to light.   Neck: Neck supple. No thyromegaly present. No JVD present.   Normal range of motion.  Cardiovascular:  Normal rate, regular rhythm, normal heart sounds and intact distal pulses.           Pulmonary/Chest: Breath sounds normal. No respiratory  distress.   Abdominal: Abdomen is soft. Bowel sounds are normal. He exhibits no distension. There is no abdominal tenderness. There is no rebound and no guarding.   Musculoskeletal:         General: Edema present.      Cervical back: Normal range of motion and neck supple.      Comments: Right foot mild swelling, abrasion to lateral malleolus area, no signs of infection or ulcers.     Neurological: He is alert and oriented to person, place, and time. He has normal strength. GCS score is 15. GCS eye subscore is 4. GCS verbal subscore is 5. GCS motor subscore is 6.   Skin: Skin is warm and dry. No rash noted.   Psychiatric: His behavior is normal. Thought content normal.         ED Course   Procedures  Labs Reviewed - No data to display       Imaging Results    None          Medications - No data to display  Medical Decision Making  Amount and/or Complexity of Data Reviewed  Independent Historian: caregiver     Details: Pt with sitters care at home from 9 AM to 7 PM daily. His sitter is with him in ED, states he does not like them to help him but they supervise him taking his medications, self hygiene and cook.                      11:48 AM    At this time pt stable, no unstable psychiatric condition for involuntary commitment. Pt AAOx4, cooperative, adequate interaction, coherent, clean and well dressed.                  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

## 2024-02-21 NOTE — ED NOTES
present with patient. Explained that patient is discharged and if needing to seek immediate psychiatric treatment to report to ER.

## 2024-04-23 ENCOUNTER — HOSPITAL ENCOUNTER (OUTPATIENT)
Dept: RADIOLOGY | Facility: CLINIC | Age: 64
Discharge: HOME | End: 2024-04-23
Attending: PHYSICIAN ASSISTANT
Payer: MEDICARE

## 2024-04-23 ENCOUNTER — OFFICE VISIT (OUTPATIENT)
Dept: ORTHOPEDICS | Facility: CLINIC | Age: 64
End: 2024-04-23
Payer: MEDICARE

## 2024-04-23 VITALS
HEART RATE: 93 BPM | HEIGHT: 71 IN | WEIGHT: 207.88 LBS | BODY MASS INDEX: 29.1 KG/M2 | SYSTOLIC BLOOD PRESSURE: 160 MMHG | DIASTOLIC BLOOD PRESSURE: 106 MMHG

## 2024-04-23 DIAGNOSIS — S82.132D: ICD-10-CM

## 2024-04-23 DIAGNOSIS — M17.12 PRIMARY OSTEOARTHRITIS OF LEFT KNEE: ICD-10-CM

## 2024-04-23 DIAGNOSIS — M25.562 ACUTE PAIN OF LEFT KNEE: ICD-10-CM

## 2024-04-23 DIAGNOSIS — M25.562 ACUTE PAIN OF LEFT KNEE: Primary | ICD-10-CM

## 2024-04-23 DIAGNOSIS — M25.562 LEFT KNEE PAIN, UNSPECIFIED CHRONICITY: ICD-10-CM

## 2024-04-23 PROCEDURE — 1160F RVW MEDS BY RX/DR IN RCRD: CPT | Mod: CPTII,,, | Performed by: PHYSICIAN ASSISTANT

## 2024-04-23 PROCEDURE — 99203 OFFICE O/P NEW LOW 30 MIN: CPT | Mod: ,,, | Performed by: PHYSICIAN ASSISTANT

## 2024-04-23 PROCEDURE — 3008F BODY MASS INDEX DOCD: CPT | Mod: CPTII,,, | Performed by: PHYSICIAN ASSISTANT

## 2024-04-23 PROCEDURE — 1101F PT FALLS ASSESS-DOCD LE1/YR: CPT | Mod: CPTII,,, | Performed by: PHYSICIAN ASSISTANT

## 2024-04-23 PROCEDURE — 1159F MED LIST DOCD IN RCRD: CPT | Mod: CPTII,,, | Performed by: PHYSICIAN ASSISTANT

## 2024-04-23 PROCEDURE — 3080F DIAST BP >= 90 MM HG: CPT | Mod: CPTII,,, | Performed by: PHYSICIAN ASSISTANT

## 2024-04-23 PROCEDURE — 3077F SYST BP >= 140 MM HG: CPT | Mod: CPTII,,, | Performed by: PHYSICIAN ASSISTANT

## 2024-04-23 PROCEDURE — 73560 X-RAY EXAM OF KNEE 1 OR 2: CPT | Mod: LT,,, | Performed by: PHYSICIAN ASSISTANT

## 2024-04-23 PROCEDURE — 3288F FALL RISK ASSESSMENT DOCD: CPT | Mod: CPTII,,, | Performed by: PHYSICIAN ASSISTANT

## 2024-04-29 NOTE — PROGRESS NOTES
Chief Complaint:   Chief Complaint   Patient presents with    Left Knee - Pain     Left knee pain, states broke leg about two years ago, has done pt in the past, states started around December, states no longer taking any pain meds for relief,        History of present illness:    This is a 63 y.o. year old male who complains of left knee pain.    Patient has had fractured subsequent fusions of his left leg pain  He was here today stating that since he was got discharged from the nursing home he has no one to prescribed his pain medicine for him.    Patient does not report any new injury or complications with his left knee or wound presently just knee someone else who prescribed him pain medicine      Current Outpatient Medications   Medication Sig Dispense Refill    benztropine (COGENTIN) 2 MG Tab Take 1 tablet (2 mg total) by mouth once daily. 30 tablet 0    haloperidoL (HALDOL) 5 MG tablet Take 1 tablet (5 mg total) by mouth every evening. 30 tablet 0    rivaroxaban (XARELTO) 10 mg Tab Take 1 tablet (10 mg total) by mouth daily with dinner or evening meal. Continue until ortho clears to be off 30 tablet 0    simvastatin (ZOCOR) 20 MG tablet Take 1 tablet (20 mg total) by mouth every evening. 90 tablet 3     Current Facility-Administered Medications   Medication Dose Route Frequency Provider Last Rate Last Admin    sodium chloride 0.9% flush 10 mL  10 mL Intravenous PRN Shawn Chatterjee O, DO           Review of Systems:    Constitution:   Denies chills, fever, and sweats.  HENT:   Denies headaches or blurry vision.  Cardiovascular:  Denies chest pain or irregular heart beat.  Respiratory:   Denies cough or shortness of breath.  Gastrointestinal:  Denies abdominal pain, nausea, or vomiting.  Musculoskeletal:   Denies muscle cramps.  Neurological:   Denies dizziness or focal weakness.  Psychiatric/Behavior: Normal mental status.  Hematology/Lymph:  Denies bleeding problem or easy bruising/bleeding.  Skin:    Denies rash  "or suspicious lesions.    Examination:    Vital Signs:    Vitals:    04/23/24 1354   BP: (!) 160/106   Pulse: 93   Weight: 94.3 kg (207 lb 14.3 oz)   Height: 5' 11" (1.803 m)       Body mass index is 29 kg/m².    Constitution:   Well-developed, well nourished patient in no acute distress.  Neurological:   Alert and oriented x 3 and cooperative to examination.     Psychiatric/Behavior: Normal mental status.  Respiratory:   No shortness of breath.  Eyes:    Extraoccular muscles intact  Skin:    No scars, rash or suspicious lesions.    Physical Exam:   Left knee exam   Patient has a healed surgical incision with no active draining or open sites.    His knee motion is 0 from her previous fusion.    He has intact sensation and motion of the left ankle and foot.        Imaging: X-rays ordered and images interpreted today personally by me of left knee three views   Patient has intact hardware from his previous fracture and fusion.    No acute changes are noted today on the x-ray        Assessment: Acute pain of left knee  -     Cancel: X-Ray Knee Complete 4 or More Views Left; Future; Expected date: 04/23/2024    Left knee pain, unspecified chronicity  -     Ambulatory referral/consult to Pain Clinic; Future; Expected date: 04/30/2024    Closed displaced fracture of medial condyle of left tibia with routine healing    Primary osteoarthritis of left knee         Plan:  At this point the patient states as he was strictly looking for someone to prescribe him in his pain medicines.    There is no orthopedic intervention that is needed and we will make a referral to pain management for          DISCLAIMER: This note may have been dictated using voice recognition software and may contain grammatical errors.     NOTE: Consult report sent to referring provider via EP[facility] EMR.  "

## 2024-10-23 DIAGNOSIS — M25.572 LEFT ANKLE PAIN: ICD-10-CM

## 2024-10-23 DIAGNOSIS — S91.302A OPEN WOUND OF LEFT FOOT: ICD-10-CM

## 2024-10-23 DIAGNOSIS — R60.0 LOWER EXTREMITY EDEMA: Primary | ICD-10-CM

## 2024-10-23 DIAGNOSIS — M79.662 PAIN OF LEFT LOWER LEG: ICD-10-CM

## 2024-11-04 ENCOUNTER — HOSPITAL ENCOUNTER (OUTPATIENT)
Dept: RADIOLOGY | Facility: HOSPITAL | Age: 64
Discharge: HOME | End: 2024-11-04
Payer: MEDICARE

## 2024-11-04 DIAGNOSIS — S91.302A OPEN WOUND OF LEFT FOOT: ICD-10-CM

## 2024-11-04 PROCEDURE — 73600 X-RAY EXAM OF ANKLE: CPT | Mod: TC,LT

## 2024-12-03 ENCOUNTER — HOSPITAL ENCOUNTER (EMERGENCY)
Facility: HOSPITAL | Age: 64
Discharge: HOME | End: 2024-12-03
Attending: EMERGENCY MEDICINE
Payer: MEDICARE

## 2024-12-03 VITALS
DIASTOLIC BLOOD PRESSURE: 83 MMHG | TEMPERATURE: 98 F | RESPIRATION RATE: 16 BRPM | SYSTOLIC BLOOD PRESSURE: 158 MMHG | BODY MASS INDEX: 26.5 KG/M2 | HEART RATE: 98 BPM | WEIGHT: 190 LBS | OXYGEN SATURATION: 96 %

## 2024-12-03 DIAGNOSIS — S91.302A OPEN WOUND OF LEFT FOOT, INITIAL ENCOUNTER: Primary | ICD-10-CM

## 2024-12-03 DIAGNOSIS — S91.309A WOUND, OPEN, FOOT: ICD-10-CM

## 2024-12-03 PROCEDURE — 87070 CULTURE OTHR SPECIMN AEROBIC: CPT

## 2024-12-03 PROCEDURE — 99284 EMERGENCY DEPT VISIT MOD MDM: CPT | Mod: 25

## 2024-12-03 RX ORDER — TRAMADOL HYDROCHLORIDE 50 MG/1
50 TABLET ORAL EVERY 6 HOURS PRN
Qty: 12 TABLET | Refills: 0 | Status: SHIPPED | OUTPATIENT
Start: 2024-12-03 | End: 2024-12-06

## 2024-12-03 RX ORDER — MUPIROCIN 20 MG/G
OINTMENT TOPICAL 3 TIMES DAILY
Qty: 30 G | Refills: 0 | Status: SHIPPED | OUTPATIENT
Start: 2024-12-03 | End: 2024-12-10

## 2024-12-03 RX ORDER — CEPHALEXIN 500 MG/1
500 CAPSULE ORAL 4 TIMES DAILY
Qty: 20 CAPSULE | Refills: 0 | Status: SHIPPED | OUTPATIENT
Start: 2024-12-03 | End: 2024-12-08

## 2024-12-03 NOTE — ED PROVIDER NOTES
"Encounter Date: 12/3/2024       History     Chief Complaint   Patient presents with    Wound Check     Pt here for wound check to L foot. Pt ambulatory per EMS.      The patient is a 64 y.o. male with a history of hypertension and anxiety who presents to the Emergency Department with a chief complaint of left foot wound.  Patient reports wound to left foot that has been present for a few months.  Patient states that wound initially started after his foot was hit by a hospital bed a few months ago.  He states that it had been healing fine with this morning that started to bleed.  He denies any fever or chills.  Symptoms began "months ago" and have been constant since onset. His pain is currently rated as a 2/10 in severity and described as aching with no radiation. Associated symptoms include nothing. Symptoms are aggravated with nothing and there are no alleviating factors. The patient denies drainage, redness, fever, or chills. He reports taking nothing prior to arrival with no relief of symptoms. No other reported symptoms at this time     The history is provided by the patient. No  was used.   Wound Check   He was treated in the ED More than 14 days ago. There has been no treatment since the wound repair. There has been bloody discharge from the wound. There is no redness present. There is no swelling present. There is no pain present. He has no difficulty moving the affected extremity or digit.     Review of patient's allergies indicates:   Allergen Reactions    Chlorpromazine      Other reaction(s): Chlorpromazine, Unknown - See comments    Fluphenazine      Other reaction(s): Fluphenazine, Unknown - See comments    Trazodone      Other reaction(s): Trazodone, Unknown - See comments     Past Medical History:   Diagnosis Date    Anxiety     Hypertension      Past Surgical History:   Procedure Laterality Date    APPLICATION OF LARGE EXTERNAL FIXATION DEVICE TO TIBIA Left 08/24/2022    " Procedure: APPLICATION, EXTERNAL FIXATION DEVICE, LARGE, TIBIA;  Surgeon: Shawn Chatterjee DO;  Location: [facility] OR;  Service: Orthopedics;  Laterality: Left;    KNEE ARTHRODESIS Left 08/28/2022    Procedure: arthrodesis ;  Surgeon: Shawn Chatterjee DO;  Location: [facility] OR;  Service: Orthopedics;  Laterality: Left;  Left knee arthrodesis    REMOVAL OF EXTERNAL FIXATION DEVICE Left 12/09/2022    Procedure: REMOVAL, EXTERNAL FIXATION DEVICE;  Surgeon: Shawn Chatterjee DO;  Location: [facility] OR;  Service: Orthopedics;  Laterality: Left;  remove ex fix, wash kneem LMA okay    REVISION OF PROCEDURE INVOLVING EXTERNAL FIXATION DEVICE  08/28/2022    Procedure: REVISION, OF EXTERNAL FIXATION;  Surgeon: Shawn Chatterjee DO;  Location: [facility] OR;  Service: Orthopedics;;     Family History   Problem Relation Name Age of Onset    No Known Problems Mother      No Known Problems Father      No Known Problems Sister      No Known Problems Brother      No Known Problems Maternal Aunt      No Known Problems Maternal Uncle      No Known Problems Paternal Aunt      No Known Problems Paternal Uncle      No Known Problems Maternal Grandmother      No Known Problems Maternal Grandfather      No Known Problems Paternal Grandmother      No Known Problems Paternal Grandfather      No Known Problems Other       Social History     Tobacco Use    Smoking status: Never    Smokeless tobacco: Never   Substance Use Topics    Alcohol use: Not Currently    Drug use: Never     Review of Systems   Constitutional:  Negative for fever.   HENT:  Negative for sore throat.    Respiratory:  Negative for shortness of breath.    Cardiovascular:  Negative for chest pain.   Gastrointestinal:  Negative for nausea.   Genitourinary:  Negative for dysuria.   Musculoskeletal:  Negative for arthralgias and back pain.   Skin:  Positive for wound. Negative for rash.   Neurological:  Negative for weakness.   Hematological:  Does not bruise/bleed easily.   All other systems reviewed and are  negative.      Physical Exam     Initial Vitals [12/03/24 1142]   BP Pulse Resp Temp SpO2   (!) 158/83 98 16 98.1 °F (36.7 °C) 96 %      MAP       --         Physical Exam    Nursing note and vitals reviewed.  Constitutional: Vital signs are normal. He appears well-developed and well-nourished.   HENT:   Head: Normocephalic.   Right Ear: Hearing and tympanic membrane normal.   Left Ear: Hearing and tympanic membrane normal. Mouth/Throat: Uvula is midline, oropharynx is clear and moist and mucous membranes are normal.   Eyes: Conjunctivae and EOM are normal. Pupils are equal, round, and reactive to light.   Cardiovascular:  Normal rate, regular rhythm, normal heart sounds and normal pulses.           Pulmonary/Chest: Effort normal and breath sounds normal.   Abdominal: Abdomen is soft. Bowel sounds are normal. There is no abdominal tenderness.   Musculoskeletal:      Cervical back: No spinous process tenderness or muscular tenderness.      Comments: Superficial wound x 2 to lateral aspect of left foot. No surrounding erythema present.      Lymphadenopathy:     He has no cervical adenopathy.   Neurological: He is alert. GCS eye subscore is 4. GCS verbal subscore is 5. GCS motor subscore is 6.   Skin: Skin is warm, dry and intact. Capillary refill takes less than 2 seconds.         ED Course   Procedures  Labs Reviewed   WOUND CULTURE (OL[facility])          Imaging Results              X-Ray Foot Complete Left (Final result)  Result time 12/03/24 13:49:53      Final result by Cortes Hawkins MD (12/03/24 13:49:53)                   Impression:      No radiographic evidence of acute osteomyelitis      Electronically signed by: Cortes Hawkins MD  Date:    12/03/2024  Time:    13:49               Narrative:    EXAMINATION:  Three views left foot    CLINICAL HISTORY:  Open wound    COMPARISON:  None    FINDINGS:  No radiopaque foreign body.  No acute fracture or dislocation.  No destructive osseous process.                         "               Medications - No data to display  Medical Decision Making  The patient is a 64 y.o. male with a history of hypertension and anxiety who presents to the Emergency Department with a chief complaint of left foot wound.  Patient reports wound to left foot that has been present for a few months.  Patient states that wound initially started after his foot was hit by a hospital bed a few months ago.  He states that it had been healing fine with this morning that started to bleed.  He denies any fever or chills.  Symptoms began "months ago" and have been constant since onset. His pain is currently rated as a 2/10 in severity and described as aching with no radiation. Associated symptoms include nothing. Symptoms are aggravated with nothing and there are no alleviating factors. The patient denies drainage, redness, fever, or chills. He reports taking nothing prior to arrival with no relief of symptoms. No other reported symptoms at this time     Judging by the patient's chief complaint and pertinent history, the patient has the following possible differential diagnoses, including but not limited to the following.  Some of these are deemed to be lower likelihood and some more likely based on my physical exam and history combined with possible lab work and/or imaging studies.   Please see the pertinent studies, and refer to the HPI.  Some of these diagnoses will take further evaluation to fully rule out, perhaps as an outpatient and the patient was encouraged to follow up when discharged for more comprehensive evaluation.    Wound check, abrasion, wound infection     Problems Addressed:  Open wound of left foot, initial encounter: acute illness or injury  Wound, open, foot: acute illness or injury    Amount and/or Complexity of Data Reviewed  Radiology:  Decision-making details documented in ED Course.    Risk  Prescription drug management.               ED Course as of 12/03/24 1408   Tue Dec 03, 2024   6351 " X-Ray Foot Complete Left    Impression:     No radiographic evidence of acute osteomyelitis   [LM]   1403 Patient is already following up with his PCP regarding to left foot.  I discussed results in detail with the patient including follow up.  He is amenable to plan and ready for discharge home.  He was given strict ER return precautions.  On discharge patient requesting stronger pain medication to go home with. [LM]      ED Course User Index  [LM] Reggie Matias NP                           Clinical Impression:  Final diagnoses:  [S91.309A] Wound, open, foot  [S91.302A] Open wound of left foot, initial encounter (Primary)          ED Disposition Condition    Discharge Stable          ED Prescriptions       Medication Sig Dispense Start Date End Date Auth. Provider    mupirocin (BACTROBAN) 2 % ointment Apply topically 3 (three) times daily. for 7 days 30 g 12/3/2024 12/10/2024 Reggie Matias NP    cephALEXin (KEFLEX) 500 MG capsule Take 1 capsule (500 mg total) by mouth 4 (four) times daily. for 5 days 20 capsule 12/3/2024 12/8/2024 Reggie Matias NP    traMADoL (ULTRAM) 50 mg tablet Take 1 tablet (50 mg total) by mouth every 6 (six) hours as needed for Pain. 12 tablet 12/3/2024 12/6/2024 Reggie Matias NP          Follow-up Information       Follow up With Specialties Details Why Contact Info    Cassidy Morales APRN Family Medicine Schedule an appointment as soon as possible for a visit   [address] [facility] [address]  [phone]               Reggie Matias NP  12/03/24 2998

## 2024-12-03 NOTE — FIRST PROVIDER EVALUATION
Medical screening examination initiated.  I have conducted a focused provider triage encounter, findings are as follows:    Brief history of present illness:  arrived to ED due to wound to L foot. Reports while walking to store on today, his wound opened.     Vitals:    12/03/24 1142   BP: (!) 158/83   Pulse: 98   Resp: 16   Temp: 98.1 °F (36.7 °C)   TempSrc: Oral   SpO2: 96%   Weight: 86.2 kg (190 lb)       Pertinent physical exam:  awake, alert, has non-labored breathing, in sort chair    Brief workup plan:  imaging and labs    Preliminary workup initiated; this workup will be continued and followed by the physician or advanced practice provider that is assigned to the patient when roomed.

## 2024-12-06 LAB — BACTERIA WND CULT: ABNORMAL

## 2025-01-28 ENCOUNTER — HOSPITAL ENCOUNTER (EMERGENCY)
Facility: HOSPITAL | Age: 65
Discharge: HOME | End: 2025-01-28
Attending: EMERGENCY MEDICINE
Payer: MEDICARE

## 2025-01-28 VITALS
HEART RATE: 111 BPM | RESPIRATION RATE: 20 BRPM | BODY MASS INDEX: 25.9 KG/M2 | HEIGHT: 71 IN | OXYGEN SATURATION: 99 % | DIASTOLIC BLOOD PRESSURE: 88 MMHG | WEIGHT: 185 LBS | TEMPERATURE: 97 F | SYSTOLIC BLOOD PRESSURE: 150 MMHG

## 2025-01-28 DIAGNOSIS — Z13.9 ENCOUNTER FOR MEDICAL SCREENING EXAMINATION: Primary | ICD-10-CM

## 2025-01-28 DIAGNOSIS — Z51.89 WOUND CHECK, ABSCESS: ICD-10-CM

## 2025-01-28 PROCEDURE — 99281 EMR DPT VST MAYX REQ PHY/QHP: CPT

## 2025-01-28 NOTE — ED PROVIDER NOTES
Encounter Date: 1/28/2025       History     Chief Complaint   Patient presents with    Psychiatric Evaluation     OPC with [employer] alleging gravely disabled and unable to care for self, delusional. Pt denies SI/HI, AV hallucinations.     Patient is brought with OPC having been written by a relative.  The OPC says patient is unable to care for himself and delusional.  The patient does not appear to be delusional.  Patient unhappy to have been brought by law enforcement to the emergency department.  Patient does left lateral ankle wound.  The wound does not appear to be particularly well cared for, but shows no signs of infection.  Patient is alert, calm, cooperative, without hallucinations or delusions.  Patient endorsing no suicidal or homicidal ideas.  Patient not responsive to any internal stimuli.        Review of patient's allergies indicates:   Allergen Reactions    Chlorpromazine      Other reaction(s): Chlorpromazine, Unknown - See comments    Fluphenazine      Other reaction(s): Fluphenazine, Unknown - See comments    Trazodone      Other reaction(s): Trazodone, Unknown - See comments     Past Medical History:   Diagnosis Date    Anxiety     Hypertension      Past Surgical History:   Procedure Laterality Date    APPLICATION OF LARGE EXTERNAL FIXATION DEVICE TO TIBIA Left 08/24/2022    Procedure: APPLICATION, EXTERNAL FIXATION DEVICE, LARGE, TIBIA;  Surgeon: Shawn Chatterjee DO;  Location: [facility];  Service: Orthopedics;  Laterality: Left;    KNEE ARTHRODESIS Left 08/28/2022    Procedure: arthrodesis ;  Surgeon: Shawn Chatterjee DO;  Location: [facility] OR;  Service: Orthopedics;  Laterality: Left;  Left knee arthrodesis    REMOVAL OF EXTERNAL FIXATION DEVICE Left 12/09/2022    Procedure: REMOVAL, EXTERNAL FIXATION DEVICE;  Surgeon: Shawn Chatterjee DO;  Location: [facility] OR;  Service: Orthopedics;  Laterality: Left;  remove ex fix, wash kneem LMA okay    REVISION OF PROCEDURE INVOLVING EXTERNAL FIXATION DEVICE  08/28/2022     Procedure: REVISION, OF EXTERNAL FIXATION;  Surgeon: Shawn Chatterjee DO;  Location: [facility];  Service: Orthopedics;;     Family History   Problem Relation Name Age of Onset    No Known Problems Mother      No Known Problems Father      No Known Problems Sister      No Known Problems Brother      No Known Problems Maternal Aunt      No Known Problems Maternal Uncle      No Known Problems Paternal Aunt      No Known Problems Paternal Uncle      No Known Problems Maternal Grandmother      No Known Problems Maternal Grandfather      No Known Problems Paternal Grandmother      No Known Problems Paternal Grandfather      No Known Problems Other       Social History     Tobacco Use    Smoking status: Never    Smokeless tobacco: Never   Substance Use Topics    Alcohol use: Not Currently    Drug use: Never     Review of Systems    Physical Exam     Initial Vitals [01/28/25 0037]   BP Pulse Resp Temp SpO2   (!) 150/88 (!) 111 20 97.3 °F (36.3 °C) 99 %      MAP       --         Physical Exam    Nursing note and vitals reviewed.  Constitutional: He appears well-developed and well-nourished. He is not diaphoretic. No distress.   HENT:   Head: Normocephalic and atraumatic.   Eyes: EOM are normal. Pupils are equal, round, and reactive to light. Right eye exhibits no discharge. Left eye exhibits no discharge.   Neck: Neck supple. No thyromegaly present. No tracheal deviation present. No JVD present.   Normal range of motion.  Cardiovascular:  Normal rate, regular rhythm, normal heart sounds and intact distal pulses.           No murmur heard.  Pulmonary/Chest: Breath sounds normal. No stridor. No respiratory distress. He has no wheezes. He has no rhonchi. He has no rales.   Abdominal: Abdomen is soft. He exhibits no distension. There is no abdominal tenderness. There is no rebound and no guarding.   Musculoskeletal:         General: No tenderness or edema. Normal range of motion.      Cervical back: Normal range of motion and neck  supple.     Neurological: He is alert and oriented to person, place, and time. He has normal strength. No cranial nerve deficit. GCS score is 15. GCS eye subscore is 4. GCS verbal subscore is 5. GCS motor subscore is 6.   Skin: Skin is warm and dry. Capillary refill takes less than 2 seconds. No rash and no abscess noted. No erythema. No pallor.   Psychiatric: He has a normal mood and affect. His behavior is normal. Judgment and thought content normal.         ED Course   Procedures  Labs Reviewed - No data to display       Imaging Results    None          Medications - No data to display  Medical Decision Making  Risk  Risk Details: Patient is here alert, calm, cooperative, able to participate with informed decision-making and uninterested in any further medical evaluation at the present time.  We simply find no suggestion there exists any imminent risk to life or health of patient or any identified others.  We find there is no legal or ethical basis to require the patient to undergo any additional medical care the present time.  He is discharged to his own recognizance.                                      Clinical Impression:  Final diagnoses:  [Z13.9] Encounter for medical screening examination (Primary)  [Z51.89] Wound check, abscess          ED Disposition Condition    Discharge Stable          ED Prescriptions    None       Follow-up Information       Follow up With Specialties Details Why Contact Info    [facility] - Emergency Dept Emergency Medicine  As needed, If symptoms worsen [address] [facility] [address] [phone]    Cassidy Morales APRN Family Medicine Call   [address] [facility] [address] [phone]               Ted Swan MD  01/28/25 0040

## 2025-01-28 NOTE — ED NOTES
"Pt maintains no SI/HI, states lives alone "and minds my own business". ERMD set pt for discharge. Pt states able to enter home without assist. Pt walks with cane brought by [employer]. Pt stood and transferred to wheelchair without assist. Cab called for pt discharge.  "

## (undated) DEVICE — GAUZE SPONGE 4'X4 12 PLY

## (undated) DEVICE — ELECTRODE REM POLYHESIVE II

## (undated) DEVICE — DRAPE C-ARMOR EQUIPMENT COVER

## (undated) DEVICE — SUT MCRYL PLUS 2-0 CT-1 36IN

## (undated) DEVICE — GEL SURGX ANTIMICROBIAL 7.5ML

## (undated) DEVICE — COVER FULLGUARD SHOE HIGH-TOP

## (undated) DEVICE — PARTICLE ARISTA AH BIONERT 5GM

## (undated) DEVICE — DRAPE STERI U-SHAPED 47X51IN

## (undated) DEVICE — BANDAGE PRCAR COMPR 11YDX6IN

## (undated) DEVICE — SUT ETHILON 2-0 FSLX 30 BLK

## (undated) DEVICE — APPLICATOR CHLORAPREP ORN 26ML

## (undated) DEVICE — DRAPE ORTH SPLIT 77X108IN

## (undated) DEVICE — BANDAGE FLEX-MASTER CLP 6X11YD

## (undated) DEVICE — Device

## (undated) DEVICE — COVER TABLE HVY DTY 60X90IN

## (undated) DEVICE — SEE MEDLINE ITEM 146292

## (undated) DEVICE — SYS CLSR DERMABOND PRINEO 22CM

## (undated) DEVICE — GLOVE 8 PROTEXIS PI BLUE

## (undated) DEVICE — BANDAGE ACE DOUBLE STER 6IN

## (undated) DEVICE — DRESSING XEROFORM 5X9IN

## (undated) DEVICE — PAD CAST 6X4YD SPECIALISTIC

## (undated) DEVICE — GAUZE SPONGE 4X4 12PLY

## (undated) DEVICE — GOWN SMARTGOWN 3XL XLONG

## (undated) DEVICE — GLOVE 8 PROTEXIS PI ORTHO

## (undated) DEVICE — TAPE SILK 3IN

## (undated) DEVICE — IMMOBILIZER TRI-PNL KNEE 26IN

## (undated) DEVICE — DRESSING GAUZE XEROFORM 5X9

## (undated) DEVICE — GLOVE 7.0 PROTEXIS PI MICRO

## (undated) DEVICE — BANDAGE ACE ELASTIC 6"

## (undated) DEVICE — DRESSING MEPITEL ONE 4X3IN

## (undated) DEVICE — PADDING 4X4YD SPECIALIST100

## (undated) DEVICE — PADDING WYTEX UNDRCST 6INX4YD

## (undated) DEVICE — ADHESIVE DERMABOND ADVANCED

## (undated) DEVICE — SEALANT ADHERUS DURAL AUTOSPRY

## (undated) DEVICE — PAD ABD 8X10 STERILE

## (undated) DEVICE — SEE MEDLINE ITEM 146322

## (undated) DEVICE — SUT CHROMIC 2-0 SH 27IN BRN

## (undated) DEVICE — GLOVE 7.0 PROTEXIS PI BLUE

## (undated) DEVICE — CANNULATED DRILL BIT

## (undated) DEVICE — SUT VICRYL 1 OB 36 CTX

## (undated) DEVICE — SUT 489T ETHILON 1-0